# Patient Record
Sex: MALE | Race: BLACK OR AFRICAN AMERICAN | NOT HISPANIC OR LATINO | URBAN - METROPOLITAN AREA
[De-identification: names, ages, dates, MRNs, and addresses within clinical notes are randomized per-mention and may not be internally consistent; named-entity substitution may affect disease eponyms.]

---

## 2019-09-13 ENCOUNTER — EMERGENCY (EMERGENCY)
Facility: HOSPITAL | Age: 74
LOS: 0 days | Discharge: HOME | End: 2019-09-13
Attending: EMERGENCY MEDICINE
Payer: MEDICARE

## 2019-09-13 VITALS
TEMPERATURE: 99 F | HEART RATE: 70 BPM | RESPIRATION RATE: 20 BRPM | WEIGHT: 160.06 LBS | DIASTOLIC BLOOD PRESSURE: 93 MMHG | HEIGHT: 71 IN | OXYGEN SATURATION: 100 % | SYSTOLIC BLOOD PRESSURE: 138 MMHG

## 2019-09-13 VITALS
HEART RATE: 63 BPM | DIASTOLIC BLOOD PRESSURE: 86 MMHG | TEMPERATURE: 98 F | OXYGEN SATURATION: 96 % | RESPIRATION RATE: 19 BRPM | SYSTOLIC BLOOD PRESSURE: 136 MMHG

## 2019-09-13 DIAGNOSIS — I12.9 HYPERTENSIVE CHRONIC KIDNEY DISEASE WITH STAGE 1 THROUGH STAGE 4 CHRONIC KIDNEY DISEASE, OR UNSPECIFIED CHRONIC KIDNEY DISEASE: ICD-10-CM

## 2019-09-13 DIAGNOSIS — R07.9 CHEST PAIN, UNSPECIFIED: ICD-10-CM

## 2019-09-13 DIAGNOSIS — K21.9 GASTRO-ESOPHAGEAL REFLUX DISEASE WITHOUT ESOPHAGITIS: ICD-10-CM

## 2019-09-13 DIAGNOSIS — F17.210 NICOTINE DEPENDENCE, CIGARETTES, UNCOMPLICATED: ICD-10-CM

## 2019-09-13 DIAGNOSIS — N18.9 CHRONIC KIDNEY DISEASE, UNSPECIFIED: ICD-10-CM

## 2019-09-13 LAB
ALBUMIN SERPL ELPH-MCNC: 3.6 G/DL — SIGNIFICANT CHANGE UP (ref 3.3–5)
ALP SERPL-CCNC: 65 U/L — SIGNIFICANT CHANGE UP (ref 40–120)
ALT FLD-CCNC: 19 U/L — SIGNIFICANT CHANGE UP (ref 12–78)
ANION GAP SERPL CALC-SCNC: 6 MMOL/L — SIGNIFICANT CHANGE UP (ref 5–17)
APTT BLD: 32.5 SEC — SIGNIFICANT CHANGE UP (ref 27.5–36.3)
AST SERPL-CCNC: 17 U/L — SIGNIFICANT CHANGE UP (ref 15–37)
BASOPHILS # BLD AUTO: 0.04 K/UL — SIGNIFICANT CHANGE UP (ref 0–0.2)
BASOPHILS NFR BLD AUTO: 0.9 % — SIGNIFICANT CHANGE UP (ref 0–2)
BILIRUB SERPL-MCNC: 0.7 MG/DL — SIGNIFICANT CHANGE UP (ref 0.2–1.2)
BUN SERPL-MCNC: 26 MG/DL — HIGH (ref 7–23)
CALCIUM SERPL-MCNC: 8.1 MG/DL — LOW (ref 8.5–10.1)
CHLORIDE SERPL-SCNC: 107 MMOL/L — SIGNIFICANT CHANGE UP (ref 96–108)
CK MB BLD-MCNC: 1.1 % — SIGNIFICANT CHANGE UP (ref 0–3.5)
CK MB CFR SERPL CALC: 1.3 NG/ML — SIGNIFICANT CHANGE UP (ref 0.5–3.6)
CK SERPL-CCNC: 115 U/L — SIGNIFICANT CHANGE UP (ref 26–308)
CO2 SERPL-SCNC: 28 MMOL/L — SIGNIFICANT CHANGE UP (ref 22–31)
CREAT SERPL-MCNC: 2.25 MG/DL — HIGH (ref 0.5–1.3)
EOSINOPHIL # BLD AUTO: 0.16 K/UL — SIGNIFICANT CHANGE UP (ref 0–0.5)
EOSINOPHIL NFR BLD AUTO: 3.6 % — SIGNIFICANT CHANGE UP (ref 0–6)
GLUCOSE SERPL-MCNC: 98 MG/DL — SIGNIFICANT CHANGE UP (ref 70–99)
HCT VFR BLD CALC: 38.5 % — LOW (ref 39–50)
HGB BLD-MCNC: 12.2 G/DL — LOW (ref 13–17)
IMM GRANULOCYTES NFR BLD AUTO: 0.2 % — SIGNIFICANT CHANGE UP (ref 0–1.5)
INR BLD: 0.92 RATIO — SIGNIFICANT CHANGE UP (ref 0.88–1.16)
LYMPHOCYTES # BLD AUTO: 1.92 K/UL — SIGNIFICANT CHANGE UP (ref 1–3.3)
LYMPHOCYTES # BLD AUTO: 43.7 % — SIGNIFICANT CHANGE UP (ref 13–44)
MCHC RBC-ENTMCNC: 29.7 PG — SIGNIFICANT CHANGE UP (ref 27–34)
MCHC RBC-ENTMCNC: 31.7 GM/DL — LOW (ref 32–36)
MCV RBC AUTO: 93.7 FL — SIGNIFICANT CHANGE UP (ref 80–100)
MONOCYTES # BLD AUTO: 0.48 K/UL — SIGNIFICANT CHANGE UP (ref 0–0.9)
MONOCYTES NFR BLD AUTO: 10.9 % — SIGNIFICANT CHANGE UP (ref 2–14)
NEUTROPHILS # BLD AUTO: 1.78 K/UL — LOW (ref 1.8–7.4)
NEUTROPHILS NFR BLD AUTO: 40.7 % — LOW (ref 43–77)
NRBC # BLD: 0 /100 WBCS — SIGNIFICANT CHANGE UP (ref 0–0)
PLATELET # BLD AUTO: 232 K/UL — SIGNIFICANT CHANGE UP (ref 150–400)
POTASSIUM SERPL-MCNC: 4.1 MMOL/L — SIGNIFICANT CHANGE UP (ref 3.5–5.3)
POTASSIUM SERPL-SCNC: 4.1 MMOL/L — SIGNIFICANT CHANGE UP (ref 3.5–5.3)
PROT SERPL-MCNC: 7.4 GM/DL — SIGNIFICANT CHANGE UP (ref 6–8.3)
PROTHROM AB SERPL-ACNC: 10.3 SEC — SIGNIFICANT CHANGE UP (ref 10–12.9)
RBC # BLD: 4.11 M/UL — LOW (ref 4.2–5.8)
RBC # FLD: 12.7 % — SIGNIFICANT CHANGE UP (ref 10.3–14.5)
SODIUM SERPL-SCNC: 141 MMOL/L — SIGNIFICANT CHANGE UP (ref 135–145)
TROPONIN I SERPL-MCNC: <.015 NG/ML — SIGNIFICANT CHANGE UP (ref 0.01–0.04)
WBC # BLD: 4.39 K/UL — SIGNIFICANT CHANGE UP (ref 3.8–10.5)
WBC # FLD AUTO: 4.39 K/UL — SIGNIFICANT CHANGE UP (ref 3.8–10.5)

## 2019-09-13 PROCEDURE — 93010 ELECTROCARDIOGRAM REPORT: CPT

## 2019-09-13 PROCEDURE — 71045 X-RAY EXAM CHEST 1 VIEW: CPT | Mod: 26

## 2019-09-13 PROCEDURE — 99285 EMERGENCY DEPT VISIT HI MDM: CPT

## 2019-09-13 RX ORDER — PANTOPRAZOLE SODIUM 20 MG/1
40 TABLET, DELAYED RELEASE ORAL ONCE
Refills: 0 | Status: COMPLETED | OUTPATIENT
Start: 2019-09-13 | End: 2019-09-13

## 2019-09-13 RX ADMIN — Medication 30 MILLILITER(S): at 14:42

## 2019-09-13 RX ADMIN — PANTOPRAZOLE SODIUM 40 MILLIGRAM(S): 20 TABLET, DELAYED RELEASE ORAL at 14:43

## 2019-09-13 NOTE — ED PROVIDER NOTE - CONSTITUTIONAL, MLM
normal... Well appearing, well nourished, awake, alert, oriented to person, place, time/situation and in no apparent distress. Speaking in clear full sentences no nasal flaring no shoulders retractions no diaphoresis not holding his head/chest/abdomen, smiling pleasant appears very comfortable

## 2019-09-13 NOTE — ED ADULT NURSE NOTE - NSIMPLEMENTINTERV_GEN_ALL_ED
Implemented All Universal Safety Interventions:  [place] to call system. Call bell, personal items and telephone within reach. Instruct patient to call for assistance. Room bathroom lighting operational. Non-slip footwear when patient is off stretcher. Physically safe environment: no spills, clutter or unnecessary equipment. Stretcher in lowest position, wheels locked, appropriate side rails in place.
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
## 2019-09-13 NOTE — ED PROVIDER NOTE - PATIENT PORTAL LINK FT
You can access the FollowMyHealth Patient Portal offered by [facility] by registering at the following website: [email or web address]. By joining [employer]’s FollowMyHealth portal, you will also be able to view your health information using other applications (apps) compatible with our system.

## 2019-09-23 NOTE — ED ADULT NURSE NOTE - OBJECTIVE STATEMENT
74 y.o male with hx of gerd, htn and chronic renal disease complains of burning chest pain for 3 days in the center of the chest.

## 2019-10-23 ENCOUNTER — EMERGENCY (EMERGENCY)
Facility: HOSPITAL | Age: 74
LOS: 0 days | Discharge: HOME | End: 2019-10-23
Attending: EMERGENCY MEDICINE
Payer: MEDICARE

## 2019-10-23 VITALS
HEIGHT: 71 IN | RESPIRATION RATE: 17 BRPM | WEIGHT: 154.98 LBS | SYSTOLIC BLOOD PRESSURE: 137 MMHG | TEMPERATURE: 98 F | DIASTOLIC BLOOD PRESSURE: 92 MMHG | OXYGEN SATURATION: 99 %

## 2019-10-23 VITALS
SYSTOLIC BLOOD PRESSURE: 133 MMHG | DIASTOLIC BLOOD PRESSURE: 75 MMHG | RESPIRATION RATE: 18 BRPM | TEMPERATURE: 98 F | OXYGEN SATURATION: 100 % | HEART RATE: 76 BPM

## 2019-10-23 DIAGNOSIS — R10.32 LEFT LOWER QUADRANT PAIN: ICD-10-CM

## 2019-10-23 DIAGNOSIS — R35.0 FREQUENCY OF MICTURITION: ICD-10-CM

## 2019-10-23 LAB
ALBUMIN SERPL ELPH-MCNC: 3.8 G/DL — SIGNIFICANT CHANGE UP (ref 3.3–5)
ALP SERPL-CCNC: 72 U/L — SIGNIFICANT CHANGE UP (ref 40–120)
ALT FLD-CCNC: 37 U/L — SIGNIFICANT CHANGE UP (ref 12–78)
ANION GAP SERPL CALC-SCNC: 5 MMOL/L — SIGNIFICANT CHANGE UP (ref 5–17)
APPEARANCE UR: CLEAR — SIGNIFICANT CHANGE UP
AST SERPL-CCNC: 31 U/L — SIGNIFICANT CHANGE UP (ref 15–37)
BASOPHILS # BLD AUTO: 0.05 K/UL — SIGNIFICANT CHANGE UP (ref 0–0.2)
BASOPHILS NFR BLD AUTO: 0.8 % — SIGNIFICANT CHANGE UP (ref 0–2)
BILIRUB SERPL-MCNC: 0.5 MG/DL — SIGNIFICANT CHANGE UP (ref 0.2–1.2)
BILIRUB UR-MCNC: NEGATIVE — SIGNIFICANT CHANGE UP
BUN SERPL-MCNC: 19 MG/DL — SIGNIFICANT CHANGE UP (ref 7–23)
CALCIUM SERPL-MCNC: 9 MG/DL — SIGNIFICANT CHANGE UP (ref 8.5–10.1)
CHLORIDE SERPL-SCNC: 108 MMOL/L — SIGNIFICANT CHANGE UP (ref 96–108)
CO2 SERPL-SCNC: 25 MMOL/L — SIGNIFICANT CHANGE UP (ref 22–31)
COLOR SPEC: YELLOW — SIGNIFICANT CHANGE UP
CREAT SERPL-MCNC: 1.47 MG/DL — HIGH (ref 0.5–1.3)
DIFF PNL FLD: NEGATIVE — SIGNIFICANT CHANGE UP
EOSINOPHIL # BLD AUTO: 0.09 K/UL — SIGNIFICANT CHANGE UP (ref 0–0.5)
EOSINOPHIL NFR BLD AUTO: 1.5 % — SIGNIFICANT CHANGE UP (ref 0–6)
GLUCOSE SERPL-MCNC: 103 MG/DL — HIGH (ref 70–99)
GLUCOSE UR QL: NEGATIVE MG/DL — SIGNIFICANT CHANGE UP
HCT VFR BLD CALC: 35.9 % — LOW (ref 39–50)
HGB BLD-MCNC: 11.3 G/DL — LOW (ref 13–17)
IMM GRANULOCYTES NFR BLD AUTO: 0.2 % — SIGNIFICANT CHANGE UP (ref 0–1.5)
KETONES UR-MCNC: NEGATIVE — SIGNIFICANT CHANGE UP
LEUKOCYTE ESTERASE UR-ACNC: NEGATIVE — SIGNIFICANT CHANGE UP
LIDOCAIN IGE QN: 113 U/L — SIGNIFICANT CHANGE UP (ref 73–393)
LYMPHOCYTES # BLD AUTO: 1.82 K/UL — SIGNIFICANT CHANGE UP (ref 1–3.3)
LYMPHOCYTES # BLD AUTO: 30.8 % — SIGNIFICANT CHANGE UP (ref 13–44)
MCHC RBC-ENTMCNC: 28.9 PG — SIGNIFICANT CHANGE UP (ref 27–34)
MCHC RBC-ENTMCNC: 31.5 GM/DL — LOW (ref 32–36)
MCV RBC AUTO: 91.8 FL — SIGNIFICANT CHANGE UP (ref 80–100)
MONOCYTES # BLD AUTO: 0.39 K/UL — SIGNIFICANT CHANGE UP (ref 0–0.9)
MONOCYTES NFR BLD AUTO: 6.6 % — SIGNIFICANT CHANGE UP (ref 2–14)
NEUTROPHILS # BLD AUTO: 3.55 K/UL — SIGNIFICANT CHANGE UP (ref 1.8–7.4)
NEUTROPHILS NFR BLD AUTO: 60.1 % — SIGNIFICANT CHANGE UP (ref 43–77)
NITRITE UR-MCNC: NEGATIVE — SIGNIFICANT CHANGE UP
NRBC # BLD: 0 /100 WBCS — SIGNIFICANT CHANGE UP (ref 0–0)
PH UR: 6 — SIGNIFICANT CHANGE UP (ref 5–8)
PLATELET # BLD AUTO: 252 K/UL — SIGNIFICANT CHANGE UP (ref 150–400)
POTASSIUM SERPL-MCNC: 4.8 MMOL/L — SIGNIFICANT CHANGE UP (ref 3.5–5.3)
POTASSIUM SERPL-SCNC: 4.8 MMOL/L — SIGNIFICANT CHANGE UP (ref 3.5–5.3)
PROT SERPL-MCNC: 7.3 GM/DL — SIGNIFICANT CHANGE UP (ref 6–8.3)
PROT UR-MCNC: NEGATIVE MG/DL — SIGNIFICANT CHANGE UP
RBC # BLD: 3.91 M/UL — LOW (ref 4.2–5.8)
RBC # FLD: 12.8 % — SIGNIFICANT CHANGE UP (ref 10.3–14.5)
SODIUM SERPL-SCNC: 138 MMOL/L — SIGNIFICANT CHANGE UP (ref 135–145)
SP GR SPEC: 1.01 — SIGNIFICANT CHANGE UP (ref 1.01–1.02)
UROBILINOGEN FLD QL: NEGATIVE MG/DL — SIGNIFICANT CHANGE UP
WBC # BLD: 5.91 K/UL — SIGNIFICANT CHANGE UP (ref 3.8–10.5)
WBC # FLD AUTO: 5.91 K/UL — SIGNIFICANT CHANGE UP (ref 3.8–10.5)

## 2019-10-23 PROCEDURE — 74176 CT ABD & PELVIS W/O CONTRAST: CPT | Mod: 26

## 2019-10-23 PROCEDURE — 99284 EMERGENCY DEPT VISIT MOD MDM: CPT

## 2019-10-23 PROCEDURE — 71045 X-RAY EXAM CHEST 1 VIEW: CPT | Mod: 26

## 2019-10-23 RX ORDER — CYCLOBENZAPRINE HYDROCHLORIDE 10 MG/1
1 TABLET, FILM COATED ORAL
Qty: 15 | Refills: 0
Start: 2019-10-23 | End: 2019-10-27

## 2019-10-23 RX ORDER — SODIUM CHLORIDE 9 MG/ML
1000 INJECTION INTRAMUSCULAR; INTRAVENOUS; SUBCUTANEOUS ONCE
Refills: 0 | Status: COMPLETED | OUTPATIENT
Start: 2019-10-23 | End: 2019-10-23

## 2019-10-23 RX ORDER — MORPHINE SULFATE 50 MG/1
4 CAPSULE, EXTENDED RELEASE ORAL ONCE
Refills: 0 | Status: DISCONTINUED | OUTPATIENT
Start: 2019-10-23 | End: 2019-10-23

## 2019-10-23 RX ORDER — ACETAMINOPHEN 500 MG
2 TABLET ORAL
Qty: 30 | Refills: 0
Start: 2019-10-23 | End: 2019-10-27

## 2019-10-23 RX ORDER — ONDANSETRON 8 MG/1
4 TABLET, FILM COATED ORAL ONCE
Refills: 0 | Status: COMPLETED | OUTPATIENT
Start: 2019-10-23 | End: 2019-10-23

## 2019-10-23 RX ORDER — IOHEXOL 300 MG/ML
30 INJECTION, SOLUTION INTRAVENOUS ONCE
Refills: 0 | Status: COMPLETED | OUTPATIENT
Start: 2019-10-23 | End: 2019-10-23

## 2019-10-23 RX ADMIN — IOHEXOL 30 MILLILITER(S): 300 INJECTION, SOLUTION INTRAVENOUS at 07:56

## 2019-10-23 RX ADMIN — ONDANSETRON 4 MILLIGRAM(S): 8 TABLET, FILM COATED ORAL at 07:56

## 2019-10-23 RX ADMIN — SODIUM CHLORIDE 1000 MILLILITER(S): 9 INJECTION INTRAMUSCULAR; INTRAVENOUS; SUBCUTANEOUS at 07:38

## 2019-10-23 RX ADMIN — MORPHINE SULFATE 4 MILLIGRAM(S): 50 CAPSULE, EXTENDED RELEASE ORAL at 07:56

## 2019-10-23 NOTE — ED ADULT NURSE NOTE - CHIEF COMPLAINT QUOTE
73 yo M  hx of gallstones, HTN  C/O LLQ ABD pain  radiating to L lower back , with urinary frequency x 1 week. pt denies N/V/dysuria

## 2019-10-23 NOTE — ED PROVIDER NOTE - PATIENT PORTAL LINK FT
You can access the FollowMyHealth Patient Portal offered by [facility] by registering at the following website: [email or web address]. By joining [employer]’s FollowMyHealth portal, you will also be able to view your health information using other applications (apps) compatible with our system.

## 2019-10-23 NOTE — ED PROVIDER NOTE - CLINICAL SUMMARY MEDICAL DECISION MAKING FREE TEXT BOX
Ddx: Diverticulitis/ kidney stone/ no abdominal tenderness or guarding to suggest surgical abdomen.   Plan: Cbc, cmp, lipase, CT abd, reassess Ddx: Diverticulitis/ kidney stone/ no abdominal tenderness or guarding to suggest surgical abdomen/ msk back pain/   Plan: Cbc, cmp, lipase, CT abd, reassess
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
## 2019-10-23 NOTE — ED ADULT TRIAGE NOTE - CHIEF COMPLAINT QUOTE
75 yo M  hx of gallstones, HTN  C/O LLQ ABD pain  radiating to L lower back , with urinary frequency x 1 week. pt denies N/V/dysuria

## 2019-10-24 LAB
CULTURE RESULTS: SIGNIFICANT CHANGE UP
SPECIMEN SOURCE: SIGNIFICANT CHANGE UP

## 2019-12-04 ENCOUNTER — INPATIENT (INPATIENT)
Facility: HOSPITAL | Age: 74
LOS: 6 days | Discharge: HOME | DRG: 871 | End: 2019-12-11
Attending: HOSPITALIST | Admitting: HOSPITALIST
Payer: MEDICARE

## 2019-12-04 VITALS
HEIGHT: 68 IN | OXYGEN SATURATION: 84 % | RESPIRATION RATE: 22 BRPM | HEART RATE: 98 BPM | SYSTOLIC BLOOD PRESSURE: 113 MMHG | TEMPERATURE: 102 F | DIASTOLIC BLOOD PRESSURE: 72 MMHG | WEIGHT: 169.98 LBS

## 2019-12-04 DIAGNOSIS — A41.9 SEPSIS, UNSPECIFIED ORGANISM: ICD-10-CM

## 2019-12-04 LAB
ALBUMIN SERPL ELPH-MCNC: 3.6 G/DL — SIGNIFICANT CHANGE UP (ref 3.3–5)
ALP SERPL-CCNC: 45 U/L — SIGNIFICANT CHANGE UP (ref 40–120)
ALT FLD-CCNC: 21 U/L — SIGNIFICANT CHANGE UP (ref 10–45)
ANION GAP SERPL CALC-SCNC: 21 MMOL/L — HIGH (ref 5–17)
APTT BLD: 28.4 SEC — SIGNIFICANT CHANGE UP (ref 27.5–36.3)
AST SERPL-CCNC: 28 U/L — SIGNIFICANT CHANGE UP (ref 10–40)
BASE EXCESS BLDV CALC-SCNC: -4.8 MMOL/L — LOW (ref -2–2)
BASE EXCESS BLDV CALC-SCNC: -7.3 MMOL/L — LOW (ref -2–2)
BASOPHILS # BLD AUTO: 0.02 K/UL — SIGNIFICANT CHANGE UP (ref 0–0.2)
BASOPHILS NFR BLD AUTO: 0.2 % — SIGNIFICANT CHANGE UP (ref 0–2)
BILIRUB SERPL-MCNC: 0.8 MG/DL — SIGNIFICANT CHANGE UP (ref 0.2–1.2)
BUN SERPL-MCNC: 83 MG/DL — HIGH (ref 7–23)
CA-I SERPL-SCNC: 0.81 MMOL/L — LOW (ref 1.12–1.3)
CA-I SERPL-SCNC: 0.96 MMOL/L — LOW (ref 1.12–1.3)
CALCIUM SERPL-MCNC: 7.7 MG/DL — LOW (ref 8.4–10.5)
CHLORIDE BLDV-SCNC: 109 MMOL/L — HIGH (ref 96–108)
CHLORIDE BLDV-SCNC: 98 MMOL/L — SIGNIFICANT CHANGE UP (ref 96–108)
CHLORIDE SERPL-SCNC: 93 MMOL/L — LOW (ref 96–108)
CO2 BLDV-SCNC: 17 MMOL/L — LOW (ref 22–30)
CO2 BLDV-SCNC: 20 MMOL/L — LOW (ref 22–30)
CO2 SERPL-SCNC: 19 MMOL/L — LOW (ref 22–31)
CREAT SERPL-MCNC: 7.17 MG/DL — HIGH (ref 0.5–1.3)
EOSINOPHIL # BLD AUTO: 0 K/UL — SIGNIFICANT CHANGE UP (ref 0–0.5)
EOSINOPHIL NFR BLD AUTO: 0 % — SIGNIFICANT CHANGE UP (ref 0–6)
GAS PNL BLDV: 133 MMOL/L — LOW (ref 135–145)
GAS PNL BLDV: 135 MMOL/L — SIGNIFICANT CHANGE UP (ref 135–145)
GAS PNL BLDV: SIGNIFICANT CHANGE UP
GLUCOSE BLDV-MCNC: 111 MG/DL — HIGH (ref 70–99)
GLUCOSE BLDV-MCNC: 168 MG/DL — HIGH (ref 70–99)
GLUCOSE SERPL-MCNC: 154 MG/DL — HIGH (ref 70–99)
HCO3 BLDV-SCNC: 16 MMOL/L — LOW (ref 21–29)
HCO3 BLDV-SCNC: 19 MMOL/L — LOW (ref 21–29)
HCT VFR BLD CALC: 24.7 % — LOW (ref 39–50)
HCT VFR BLDA CALC: 20 % — CRITICAL LOW (ref 39–50)
HCT VFR BLDA CALC: 24 % — LOW (ref 39–50)
HGB BLD CALC-MCNC: 6.5 G/DL — CRITICAL LOW (ref 13–17)
HGB BLD CALC-MCNC: 7.8 G/DL — LOW (ref 13–17)
HGB BLD-MCNC: 8 G/DL — LOW (ref 13–17)
IMM GRANULOCYTES NFR BLD AUTO: 1.5 % — SIGNIFICANT CHANGE UP (ref 0–1.5)
INR BLD: 1.1 RATIO — SIGNIFICANT CHANGE UP (ref 0.88–1.16)
LACTATE BLDV-MCNC: 1 MMOL/L — SIGNIFICANT CHANGE UP (ref 0.7–2)
LACTATE BLDV-MCNC: 1.5 MMOL/L — SIGNIFICANT CHANGE UP (ref 0.7–2)
LYMPHOCYTES # BLD AUTO: 1.04 K/UL — SIGNIFICANT CHANGE UP (ref 1–3.3)
LYMPHOCYTES # BLD AUTO: 11.7 % — LOW (ref 13–44)
MCHC RBC-ENTMCNC: 27.4 PG — SIGNIFICANT CHANGE UP (ref 27–34)
MCHC RBC-ENTMCNC: 32.4 GM/DL — SIGNIFICANT CHANGE UP (ref 32–36)
MCV RBC AUTO: 84.6 FL — SIGNIFICANT CHANGE UP (ref 80–100)
MONOCYTES # BLD AUTO: 0.62 K/UL — SIGNIFICANT CHANGE UP (ref 0–0.9)
MONOCYTES NFR BLD AUTO: 7 % — SIGNIFICANT CHANGE UP (ref 2–14)
NEUTROPHILS # BLD AUTO: 7.09 K/UL — SIGNIFICANT CHANGE UP (ref 1.8–7.4)
NEUTROPHILS NFR BLD AUTO: 79.6 % — HIGH (ref 43–77)
NRBC # BLD: 0 /100 WBCS — SIGNIFICANT CHANGE UP (ref 0–0)
PCO2 BLDV: 26 MMHG — LOW (ref 35–50)
PCO2 BLDV: 33 MMHG — LOW (ref 35–50)
PH BLDV: 7.38 — SIGNIFICANT CHANGE UP (ref 7.35–7.45)
PH BLDV: 7.41 — SIGNIFICANT CHANGE UP (ref 7.35–7.45)
PLAT MORPH BLD: NORMAL — SIGNIFICANT CHANGE UP
PLATELET # BLD AUTO: 244 K/UL — SIGNIFICANT CHANGE UP (ref 150–400)
PO2 BLDV: 34 MMHG — SIGNIFICANT CHANGE UP (ref 25–45)
PO2 BLDV: 38 MMHG — SIGNIFICANT CHANGE UP (ref 25–45)
POTASSIUM BLDV-SCNC: 2.2 MMOL/L — CRITICAL LOW (ref 3.5–5.3)
POTASSIUM BLDV-SCNC: 2.9 MMOL/L — CRITICAL LOW (ref 3.5–5.3)
POTASSIUM SERPL-MCNC: 3.3 MMOL/L — LOW (ref 3.5–5.3)
POTASSIUM SERPL-SCNC: 3.3 MMOL/L — LOW (ref 3.5–5.3)
PROT SERPL-MCNC: 7.3 G/DL — SIGNIFICANT CHANGE UP (ref 6–8.3)
PROTHROM AB SERPL-ACNC: 12.7 SEC — SIGNIFICANT CHANGE UP (ref 10–12.9)
RAPID RVP RESULT: SIGNIFICANT CHANGE UP
RBC # BLD: 2.92 M/UL — LOW (ref 4.2–5.8)
RBC # FLD: 13.7 % — SIGNIFICANT CHANGE UP (ref 10.3–14.5)
RBC BLD AUTO: NORMAL — SIGNIFICANT CHANGE UP
SAO2 % BLDV: 58 % — LOW (ref 67–88)
SAO2 % BLDV: 65 % — LOW (ref 67–88)
SODIUM SERPL-SCNC: 133 MMOL/L — LOW (ref 135–145)
WBC # BLD: 8.9 K/UL — SIGNIFICANT CHANGE UP (ref 3.8–10.5)
WBC # FLD AUTO: 8.9 K/UL — SIGNIFICANT CHANGE UP (ref 3.8–10.5)

## 2019-12-04 PROCEDURE — 93010 ELECTROCARDIOGRAM REPORT: CPT

## 2019-12-04 PROCEDURE — 71045 X-RAY EXAM CHEST 1 VIEW: CPT | Mod: 26

## 2019-12-04 PROCEDURE — 99291 CRITICAL CARE FIRST HOUR: CPT | Mod: GC

## 2019-12-04 RX ORDER — IPRATROPIUM/ALBUTEROL SULFATE 18-103MCG
3 AEROSOL WITH ADAPTER (GRAM) INHALATION ONCE
Refills: 0 | Status: COMPLETED | OUTPATIENT
Start: 2019-12-04 | End: 2019-12-04

## 2019-12-04 RX ORDER — PIPERACILLIN AND TAZOBACTAM 4; .5 G/20ML; G/20ML
3.38 INJECTION, POWDER, LYOPHILIZED, FOR SOLUTION INTRAVENOUS ONCE
Refills: 0 | Status: COMPLETED | OUTPATIENT
Start: 2019-12-04 | End: 2019-12-04

## 2019-12-04 RX ORDER — ACETAMINOPHEN 500 MG
975 TABLET ORAL ONCE
Refills: 0 | Status: COMPLETED | OUTPATIENT
Start: 2019-12-04 | End: 2019-12-04

## 2019-12-04 RX ORDER — VANCOMYCIN HCL 1 G
1500 VIAL (EA) INTRAVENOUS ONCE
Refills: 0 | Status: COMPLETED | OUTPATIENT
Start: 2019-12-04 | End: 2019-12-04

## 2019-12-04 RX ORDER — POTASSIUM CHLORIDE 20 MEQ
10 PACKET (EA) ORAL ONCE
Refills: 0 | Status: COMPLETED | OUTPATIENT
Start: 2019-12-04 | End: 2019-12-04

## 2019-12-04 RX ORDER — SODIUM CHLORIDE 9 MG/ML
2400 INJECTION INTRAMUSCULAR; INTRAVENOUS; SUBCUTANEOUS ONCE
Refills: 0 | Status: COMPLETED | OUTPATIENT
Start: 2019-12-04 | End: 2019-12-04

## 2019-12-04 RX ADMIN — SODIUM CHLORIDE 2400 MILLILITER(S): 9 INJECTION INTRAMUSCULAR; INTRAVENOUS; SUBCUTANEOUS at 22:22

## 2019-12-04 RX ADMIN — Medication 3 MILLILITER(S): at 20:40

## 2019-12-04 RX ADMIN — Medication 300 MILLIGRAM(S): at 22:21

## 2019-12-04 RX ADMIN — PIPERACILLIN AND TAZOBACTAM 200 GRAM(S): 4; .5 INJECTION, POWDER, LYOPHILIZED, FOR SOLUTION INTRAVENOUS at 20:38

## 2019-12-04 RX ADMIN — Medication 3 MILLILITER(S): at 20:38

## 2019-12-04 RX ADMIN — Medication 975 MILLIGRAM(S): at 20:37
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
## 2019-12-04 NOTE — ED PROVIDER NOTE - PHYSICAL EXAMINATION
Gen: thin, uncomfortable appearing  Eyes: PERRL, EOMI   HENT: Normocephalic, atraumatic. External ears normal, no rhinorrhea, moist mucous membranes.   CV: RRR, no M/R/G  Resp: mid lung field crackles b/l, non-labored, speaking without difficulty on room air  Abd: soft, non tender, non rigid, no guarding or rebound tenderness  Back: No CVAT bilaterally, no midline ttp  Skin: dry, wwp   Neuro: AOx3, speech is fluent and appropriate  Psych: Mood "alright", affect euthymic

## 2019-12-04 NOTE — ED PROVIDER NOTE - CLINICAL SUMMARY MEDICAL DECISION MAKING FREE TEXT BOX
74M presenting for evalaution of 6 days of fevers, cough, fatigue, on exam with crackles mid lung fields, appears uncomfortable, febrile, tachypneic with O2 sat in the low 90s on RA, start on O2NC, sepsis labs + cxr, ivf 30cc/kg, apap, zosyn, follow up studies, reassess, dispo.

## 2019-12-04 NOTE — ED ADULT NURSE NOTE - OBJECTIVE STATEMENT
Patient is a 74 year old male complaining of SOB. Arrived by EMS. Patient has history of htn. Patient is A&O x 1. Pt reports feeling weak and cough since thanksgiving, pt family states he started to get confused yesterday. pt has SOB and fever and is confused. pt family states he received a flu shot this year. Denies complaints of chest pain, n/v/d, headache, syncope, burning urination, blood in urine, blood in stool. Abd is soft, non tender, non distended. Skin is hot and dry. Color is consistent with ethnicity. pt in tripod position, pt has chills. Safety and comfort maintained. family at the bedside. Will continue to monitor.

## 2019-12-04 NOTE — ED PROVIDER NOTE - CARE PLAN
Principal Discharge DX:	Sepsis  Secondary Diagnosis:	Pneumonia  Secondary Diagnosis:	FLORENTIN (acute kidney injury) Principal Discharge DX:	Sepsis  Goal:	hypoxia  Secondary Diagnosis:	Pneumonia  Secondary Diagnosis:	FLORENTIN (acute kidney injury)

## 2019-12-04 NOTE — ED PROVIDER NOTE - ATTENDING CONTRIBUTION TO CARE
patient is a smoker with cough fever, confusion x 3 days.   moderate symptoms of shortness of breath and weakness  persistent  patient has not done very much over the past 3 days.   mild confusion, states it is 1949 several times until redirected and then states it is 2019  dry mm  tachycardic   mild tachypnea  crackles to bilateral lung fields, right anterior lung field  no edema  soft, ntnd  no rebound/guarding   no rash/vesicles/petechiae   no gross deformity of extremities   concern for sepsis secondary to uri  Will obtain IV x 2, cbc, cmp, +/-ce, VBG with lactate at time 0, fluid bolus @ 30cc/kg initiated upon departure from the room on initial assessment within 30 minutes of arrival, urine analysis and blood cultures x 2 are obtained prior to antibiotics =>prior to administration of antibiotics goal within 3 hours of arrival, and will repeat lactate with VBG if the original is elevated and do so within 6 hours of initial lactate.   Will follow up on labs, analgesia, imaging, reassess and disposition to the inpatient team as clinically indicated.   Based on patient's history and physical exam, as well as the results of today's workup, I feel that patient warrants admission to the hospital for further workup/evaluation and continued management. I discussed the findings of today's workup with the patient and addressed the patient's questions and concerns. The patient was agreeable with admission. Our team spoke with the inpatient receiving team who accepted the patient for admission and subsequently took over the patient's care at the time of admission.

## 2019-12-04 NOTE — ED ADULT NURSE NOTE - NSIMPLEMENTINTERV_GEN_ALL_ED
Implemented All Fall Risk Interventions:  [place] to call system. Call bell, personal items and telephone within reach. Instruct patient to call for assistance. Room bathroom lighting operational. Non-slip footwear when patient is off stretcher. Physically safe environment: no spills, clutter or unnecessary equipment. Stretcher in lowest position, wheels locked, appropriate side rails in place. Provide visual cue, wrist band, yellow gown, etc. Monitor gait and stability. Monitor for mental status changes and reorient to person, place, and time. Review medications for side effects contributing to fall risk. Reinforce activity limits and safety measures with patient and family.

## 2019-12-05 ENCOUNTER — TRANSCRIPTION ENCOUNTER (OUTPATIENT)
Age: 74
End: 2019-12-05

## 2019-12-05 DIAGNOSIS — M54.9 DORSALGIA, UNSPECIFIED: ICD-10-CM

## 2019-12-05 DIAGNOSIS — I10 ESSENTIAL (PRIMARY) HYPERTENSION: ICD-10-CM

## 2019-12-05 DIAGNOSIS — J18.9 PNEUMONIA, UNSPECIFIED ORGANISM: ICD-10-CM

## 2019-12-05 DIAGNOSIS — Z79.899 OTHER LONG TERM (CURRENT) DRUG THERAPY: ICD-10-CM

## 2019-12-05 DIAGNOSIS — J18.1 LOBAR PNEUMONIA, UNSPECIFIED ORGANISM: ICD-10-CM

## 2019-12-05 DIAGNOSIS — N17.9 ACUTE KIDNEY FAILURE, UNSPECIFIED: ICD-10-CM

## 2019-12-05 DIAGNOSIS — Z29.9 ENCOUNTER FOR PROPHYLACTIC MEASURES, UNSPECIFIED: ICD-10-CM

## 2019-12-05 DIAGNOSIS — A41.9 SEPSIS, UNSPECIFIED ORGANISM: ICD-10-CM

## 2019-12-05 DIAGNOSIS — G93.41 METABOLIC ENCEPHALOPATHY: ICD-10-CM

## 2019-12-05 DIAGNOSIS — D64.9 ANEMIA, UNSPECIFIED: ICD-10-CM

## 2019-12-05 DIAGNOSIS — E87.1 HYPO-OSMOLALITY AND HYPONATREMIA: ICD-10-CM

## 2019-12-05 LAB
ANION GAP SERPL CALC-SCNC: 15 MMOL/L — SIGNIFICANT CHANGE UP (ref 5–17)
APPEARANCE UR: ABNORMAL
BACTERIA # UR AUTO: ABNORMAL
BILIRUB UR-MCNC: ABNORMAL
BUN SERPL-MCNC: 86 MG/DL — HIGH (ref 7–23)
CALCIUM SERPL-MCNC: 7.4 MG/DL — LOW (ref 8.4–10.5)
CHLORIDE SERPL-SCNC: 98 MMOL/L — SIGNIFICANT CHANGE UP (ref 96–108)
CO2 SERPL-SCNC: 20 MMOL/L — LOW (ref 22–31)
COLOR SPEC: YELLOW — SIGNIFICANT CHANGE UP
COMMENT - URINE: SIGNIFICANT CHANGE UP
CREAT ?TM UR-MCNC: 224 MG/DL — SIGNIFICANT CHANGE UP
CREAT SERPL-MCNC: 7.35 MG/DL — HIGH (ref 0.5–1.3)
CULTURE RESULTS: NO GROWTH — SIGNIFICANT CHANGE UP
DIFF PNL FLD: NEGATIVE — SIGNIFICANT CHANGE UP
EPI CELLS # UR: 5 — SIGNIFICANT CHANGE UP
FERRITIN SERPL-MCNC: 1601 NG/ML — HIGH (ref 30–400)
FOLATE SERPL-MCNC: 15.5 NG/ML — SIGNIFICANT CHANGE UP
GLUCOSE SERPL-MCNC: 155 MG/DL — HIGH (ref 70–99)
GLUCOSE UR QL: NEGATIVE — SIGNIFICANT CHANGE UP
GRAN CASTS # UR COMP ASSIST: 6 /LPF — SIGNIFICANT CHANGE UP
HCT VFR BLD CALC: 23.5 % — LOW (ref 39–50)
HGB BLD-MCNC: 8 G/DL — LOW (ref 13–17)
HYALINE CASTS # UR AUTO: 16 /LPF — HIGH (ref 0–7)
IRON SATN MFR SERPL: 5 % — LOW (ref 16–55)
IRON SATN MFR SERPL: 9 UG/DL — LOW (ref 45–165)
KETONES UR-MCNC: NEGATIVE — SIGNIFICANT CHANGE UP
LEGIONELLA AG UR QL: NEGATIVE — SIGNIFICANT CHANGE UP
LEUKOCYTE ESTERASE UR-ACNC: NEGATIVE — SIGNIFICANT CHANGE UP
MAGNESIUM SERPL-MCNC: 1.2 MG/DL — LOW (ref 1.6–2.6)
MCHC RBC-ENTMCNC: 28.4 PG — SIGNIFICANT CHANGE UP (ref 27–34)
MCHC RBC-ENTMCNC: 34 GM/DL — SIGNIFICANT CHANGE UP (ref 32–36)
MCV RBC AUTO: 83.3 FL — SIGNIFICANT CHANGE UP (ref 80–100)
NITRITE UR-MCNC: NEGATIVE — SIGNIFICANT CHANGE UP
NRBC # BLD: 0 /100 WBCS — SIGNIFICANT CHANGE UP (ref 0–0)
OSMOLALITY UR: 358 MOS/KG — SIGNIFICANT CHANGE UP (ref 300–900)
PH UR: 5.5 — SIGNIFICANT CHANGE UP (ref 5–8)
PHOSPHATE SERPL-MCNC: 4 MG/DL — SIGNIFICANT CHANGE UP (ref 2.5–4.5)
PLATELET # BLD AUTO: 233 K/UL — SIGNIFICANT CHANGE UP (ref 150–400)
POTASSIUM SERPL-MCNC: 3.4 MMOL/L — LOW (ref 3.5–5.3)
POTASSIUM SERPL-SCNC: 3.4 MMOL/L — LOW (ref 3.5–5.3)
PROT ?TM UR-MCNC: 80 MG/DL — HIGH (ref 0–12)
PROT UR-MCNC: ABNORMAL
PROT/CREAT UR-RTO: 0.4 RATIO — HIGH (ref 0–0.2)
RBC # BLD: 2.82 M/UL — LOW (ref 4.2–5.8)
RBC # BLD: 2.82 M/UL — LOW (ref 4.2–5.8)
RBC # FLD: 13.6 % — SIGNIFICANT CHANGE UP (ref 10.3–14.5)
RBC CASTS # UR COMP ASSIST: 3 /HPF — SIGNIFICANT CHANGE UP (ref 0–4)
RETICS #: 25.1 K/UL — SIGNIFICANT CHANGE UP (ref 25–125)
RETICS/RBC NFR: 0.9 % — SIGNIFICANT CHANGE UP (ref 0.5–2.5)
SODIUM SERPL-SCNC: 133 MMOL/L — LOW (ref 135–145)
SODIUM UR-SCNC: <35 MMOL/L — SIGNIFICANT CHANGE UP
SP GR SPEC: 1.02 — SIGNIFICANT CHANGE UP (ref 1.01–1.02)
SPECIMEN SOURCE: SIGNIFICANT CHANGE UP
TIBC SERPL-MCNC: 198 UG/DL — LOW (ref 220–430)
UIBC SERPL-MCNC: 189 UG/DL — SIGNIFICANT CHANGE UP (ref 110–370)
UROBILINOGEN FLD QL: NEGATIVE — SIGNIFICANT CHANGE UP
VIT B12 SERPL-MCNC: 571 PG/ML — SIGNIFICANT CHANGE UP (ref 232–1245)
WBC # BLD: 9.38 K/UL — SIGNIFICANT CHANGE UP (ref 3.8–10.5)
WBC # FLD AUTO: 9.38 K/UL — SIGNIFICANT CHANGE UP (ref 3.8–10.5)
WBC UR QL: 2 /HPF — SIGNIFICANT CHANGE UP (ref 0–5)

## 2019-12-05 PROCEDURE — 99223 1ST HOSP IP/OBS HIGH 75: CPT

## 2019-12-05 PROCEDURE — 76770 US EXAM ABDO BACK WALL COMP: CPT | Mod: 26

## 2019-12-05 PROCEDURE — 99223 1ST HOSP IP/OBS HIGH 75: CPT | Mod: GC

## 2019-12-05 RX ORDER — CALCITRIOL 0.5 UG/1
0.25 CAPSULE ORAL DAILY
Refills: 0 | Status: DISCONTINUED | OUTPATIENT
Start: 2019-12-05 | End: 2019-12-11

## 2019-12-05 RX ORDER — SODIUM CHLORIDE 9 MG/ML
1000 INJECTION, SOLUTION INTRAVENOUS
Refills: 0 | Status: DISCONTINUED | OUTPATIENT
Start: 2019-12-05 | End: 2019-12-06

## 2019-12-05 RX ORDER — AZITHROMYCIN 500 MG/1
500 TABLET, FILM COATED ORAL EVERY 24 HOURS
Refills: 0 | Status: DISCONTINUED | OUTPATIENT
Start: 2019-12-05 | End: 2019-12-05

## 2019-12-05 RX ORDER — FAMOTIDINE 10 MG/ML
1 INJECTION INTRAVENOUS
Qty: 0 | Refills: 0 | DISCHARGE

## 2019-12-05 RX ORDER — CEFTRIAXONE 500 MG/1
1000 INJECTION, POWDER, FOR SOLUTION INTRAMUSCULAR; INTRAVENOUS EVERY 24 HOURS
Refills: 0 | Status: COMPLETED | OUTPATIENT
Start: 2019-12-05 | End: 2019-12-11

## 2019-12-05 RX ORDER — OXYCODONE HYDROCHLORIDE 5 MG/1
10 TABLET ORAL EVERY 6 HOURS
Refills: 0 | Status: DISCONTINUED | OUTPATIENT
Start: 2019-12-05 | End: 2019-12-06

## 2019-12-05 RX ORDER — SODIUM CHLORIDE 9 MG/ML
500 INJECTION, SOLUTION INTRAVENOUS ONCE
Refills: 0 | Status: COMPLETED | OUTPATIENT
Start: 2019-12-05 | End: 2019-12-05

## 2019-12-05 RX ORDER — TIZANIDINE 4 MG/1
1 TABLET ORAL
Qty: 0 | Refills: 0 | DISCHARGE

## 2019-12-05 RX ORDER — NIFEDIPINE 30 MG
1 TABLET, EXTENDED RELEASE 24 HR ORAL
Qty: 0 | Refills: 0 | DISCHARGE

## 2019-12-05 RX ORDER — PANTOPRAZOLE SODIUM 20 MG/1
40 TABLET, DELAYED RELEASE ORAL
Refills: 0 | Status: DISCONTINUED | OUTPATIENT
Start: 2019-12-05 | End: 2019-12-11

## 2019-12-05 RX ORDER — HEPARIN SODIUM 5000 [USP'U]/ML
5000 INJECTION INTRAVENOUS; SUBCUTANEOUS EVERY 12 HOURS
Refills: 0 | Status: DISCONTINUED | OUTPATIENT
Start: 2019-12-05 | End: 2019-12-11

## 2019-12-05 RX ORDER — ACETAMINOPHEN 500 MG
650 TABLET ORAL ONCE
Refills: 0 | Status: COMPLETED | OUTPATIENT
Start: 2019-12-05 | End: 2019-12-05

## 2019-12-05 RX ORDER — LISINOPRIL 2.5 MG/1
1 TABLET ORAL
Qty: 0 | Refills: 0 | DISCHARGE

## 2019-12-05 RX ORDER — MAGNESIUM SULFATE 500 MG/ML
1 VIAL (ML) INJECTION ONCE
Refills: 0 | Status: COMPLETED | OUTPATIENT
Start: 2019-12-05 | End: 2019-12-05

## 2019-12-05 RX ORDER — ACETAMINOPHEN 500 MG
650 TABLET ORAL EVERY 6 HOURS
Refills: 0 | Status: DISCONTINUED | OUTPATIENT
Start: 2019-12-05 | End: 2019-12-11

## 2019-12-05 RX ORDER — DICLOFENAC SODIUM 75 MG/1
1 TABLET, DELAYED RELEASE ORAL
Qty: 0 | Refills: 0 | DISCHARGE

## 2019-12-05 RX ADMIN — Medication 100 MILLIEQUIVALENT(S): at 02:14

## 2019-12-05 RX ADMIN — HEPARIN SODIUM 5000 UNIT(S): 5000 INJECTION INTRAVENOUS; SUBCUTANEOUS at 17:01

## 2019-12-05 RX ADMIN — Medication 650 MILLIGRAM(S): at 03:58

## 2019-12-05 RX ADMIN — AZITHROMYCIN 250 MILLIGRAM(S): 500 TABLET, FILM COATED ORAL at 06:22

## 2019-12-05 RX ADMIN — SODIUM CHLORIDE 75 MILLILITER(S): 9 INJECTION, SOLUTION INTRAVENOUS at 17:01

## 2019-12-05 RX ADMIN — OXYCODONE HYDROCHLORIDE 10 MILLIGRAM(S): 5 TABLET ORAL at 17:54

## 2019-12-05 RX ADMIN — SODIUM CHLORIDE 75 MILLILITER(S): 9 INJECTION, SOLUTION INTRAVENOUS at 16:47

## 2019-12-05 RX ADMIN — SODIUM CHLORIDE 75 MILLILITER(S): 9 INJECTION, SOLUTION INTRAVENOUS at 11:01

## 2019-12-05 RX ADMIN — Medication 100 MILLIEQUIVALENT(S): at 01:08

## 2019-12-05 RX ADMIN — Medication 650 MILLIGRAM(S): at 04:50

## 2019-12-05 RX ADMIN — CEFTRIAXONE 100 MILLIGRAM(S): 500 INJECTION, POWDER, FOR SOLUTION INTRAMUSCULAR; INTRAVENOUS at 05:56

## 2019-12-05 RX ADMIN — Medication 650 MILLIGRAM(S): at 15:26

## 2019-12-05 RX ADMIN — CALCITRIOL 0.25 MICROGRAM(S): 0.5 CAPSULE ORAL at 11:01

## 2019-12-05 RX ADMIN — SODIUM CHLORIDE 1000 MILLILITER(S): 9 INJECTION, SOLUTION INTRAVENOUS at 15:20

## 2019-12-05 RX ADMIN — PANTOPRAZOLE SODIUM 40 MILLIGRAM(S): 20 TABLET, DELAYED RELEASE ORAL at 06:54

## 2019-12-05 RX ADMIN — OXYCODONE HYDROCHLORIDE 10 MILLIGRAM(S): 5 TABLET ORAL at 16:54

## 2019-12-05 RX ADMIN — HEPARIN SODIUM 5000 UNIT(S): 5000 INJECTION INTRAVENOUS; SUBCUTANEOUS at 05:56

## 2019-12-05 RX ADMIN — Medication 100 GRAM(S): at 16:47

## 2019-12-05 NOTE — H&P ADULT - NSICDXPASTMEDICALHX_GEN_ALL_CORE_FT
PAST MEDICAL HISTORY:  Chronic renal disease sees md twice a year    Hypertension PAST MEDICAL HISTORY:  Chronic back pain opioid dependence    Chronic renal disease unknown severity    Hypertension

## 2019-12-05 NOTE — H&P ADULT - NSHPPHYSICALEXAM_GEN_ALL_CORE
Vital Signs Last 24 Hrs  T(C): 37.1 (05 Dec 2019 00:53), Max: 38.9 (04 Dec 2019 19:59)  T(F): 98.8 (05 Dec 2019 00:53), Max: 102.1 (04 Dec 2019 21:18)  HR: 88 (05 Dec 2019 00:53) (88 - 103)  BP: 106/60 (05 Dec 2019 00:53) (91/65 - 113/72)  BP(mean): --  RR: 20 (05 Dec 2019 00:53) (20 - 30)  SpO2: 90% (05 Dec 2019 00:53) (84% - 96%)

## 2019-12-05 NOTE — H&P ADULT - PROBLEM SELECTOR PLAN 3
Cr elevated to 7 from 1.4 in 10/2019.  - F/u renal US  - Encephalopathy more likely from sepsis vs uremia.  - s/p 2 L IVF.   - Renally dose antibiotics Cr elevated to 7 from 1.4 in 10/2019. Has unknown level of CKD.  - F/u renal US  - Encephalopathy may be secondary to sepsis and uremia  - s/p 2 L IVF.   - Renally dose antibiotics  - nephrology consult in the AM.  --trend BmP and assess response to IVF.  --hold home diuretic, ACE-I

## 2019-12-05 NOTE — H&P ADULT - PROBLEM SELECTOR PLAN 8
Patient and wife unsure about medications. Will need to verify medications with pharmacy. Patient goes to [facility] on [address]. Unverified med rec done with pharmacy records from other sources.

## 2019-12-05 NOTE — H&P ADULT - PROBLEM SELECTOR PLAN 4
DVT ppx: HSQ  Renal diet Normocytic anemia; Hgb dropped from 12 (in 9/2019) to 9.  - Recommend outpatient cancer screening. --may be due to sepsis vs uremia, likely multifactorial  --frequent reorientation  --treatement as above

## 2019-12-05 NOTE — PROGRESS NOTE ADULT - PROBLEM SELECTOR PLAN 7
--with chronic pain and opioid dependence [facility] [phone]  --given mild encephalopathy, will reduce dose of oxycodone from 30mg to 10mg, hold for sedation. - with chronic pain and opioid dependence [facility] [phone]  - given mild encephalopathy, will reduce dose of oxycodone from 30mg to 10mg, hold for sedation.

## 2019-12-05 NOTE — H&P ADULT - PROBLEM SELECTOR PLAN 5
Normocytic anemia; Hgb dropped from 12 (in 9/2019) to 9.  - Recommend outpatient cancer screening. Holding anti-HTN medications (coreg and amlodipine) d/t borderline BP. Normocytic anemia; Hgb decreased from 12 (in 9/2019) to 9.  --patient denies hematuria, melena, hematochezia  --may be secondary to CKD  --will send iron studies, other anemia w/u, trend CBC

## 2019-12-05 NOTE — H&P ADULT - PROBLEM SELECTOR PLAN 7
21 DVT ppx: HSQ  Renal diet --with chronic pain and opioid dependence [facility] [phone]  --given mild encephalopathy, will reduce dose of oxycodone from 30mg to 10mg, hold for sedation.

## 2019-12-05 NOTE — DISCHARGE NOTE NURSING/CASE MANAGEMENT/SOCIAL WORK - NSDCPEEMAIL_GEN_ALL_CORE
[facility] for Tobacco Control email [email or web address]

## 2019-12-05 NOTE — PROGRESS NOTE ADULT - PROBLEM SELECTOR PLAN 4
--may be due to sepsis vs uremia, likely multifactorial  --frequent reorientation  --treatement as above - may be due to sepsis vs uremia, likely multifactorial  - frequent reorientation  - treatement of underlying PNA/sepsis and FLORENTIN as above
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
## 2019-12-05 NOTE — PROGRESS NOTE ADULT - PROBLEM SELECTOR PLAN 8
Patient and wife unsure about medications. Will need to verify medications with pharmacy. Patient goes to [facility] on [address]. Unverified med rec done with pharmacy records from other sources. Patient's wife brought in meds from home today; pharmacy also contacted by [facility] pharmacy staff and med rec updated.

## 2019-12-05 NOTE — H&P ADULT - PROBLEM SELECTOR PLAN 6
Patient and wife unsure about medications. Will need to verify medications with pharmacy. Patient goes to [facility] on [address]. Holding anti-HTN medications in the setting of low blood pressure with infection.

## 2019-12-05 NOTE — H&P ADULT - PROBLEM SELECTOR PLAN 1
Sepsis for pna. S/p vanc zosyn  - Will c/w ctx and azithromycin  - F/u blood and urine cultures --CXR findings as above  --will cover with CTX and azithromycin  --F/u urine legionella
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
## 2019-12-05 NOTE — PATIENT PROFILE ADULT - ANY IMPAIRED UPPER EXTREMITY FUNCTION WITHIN A WEEK PRIOR TO ADMISSION RELATED TO?
I talked to Dr Angelina Alonzo who continue to not wish to take him on. I dont have any other pain doctors unless he has not seen Dr Obrien Rastafarian. no

## 2019-12-05 NOTE — DISCHARGE NOTE NURSING/CASE MANAGEMENT/SOCIAL WORK - NSDCPEWEB_GEN_ALL_CORE
[facility] for Tobacco Control website --- [email or web address] website --- [email or web address]

## 2019-12-05 NOTE — PHARMACOTHERAPY INTERVENTION NOTE - COMMENTS
Confirmed home medications with patient and pharmacy. Patient is on Amlodipine 10mg daily, Carvediolol 3.125mg twice daily, Lasix 40mg daily, Lisinopril 10mg daily, and Nifedipine XL 90mg daily for hypertension, all of which are on hold inpatient at the moment. Patient does not report to be taking any pain medications prior to admission other than oxycodone 30mg every 6 hours as needed, verified on iSTOP. Patient reports adherence to his medications at home - states he lays out his medications daily on a tray. Please refer to Outpatient Medication Review for full comprehensive list.    Uma Samuel, PharmD  PGY1 Pharmacy Practice Resident  [phone]
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
## 2019-12-05 NOTE — DISCHARGE NOTE NURSING/CASE MANAGEMENT/SOCIAL WORK - PATIENT PORTAL LINK FT
You can access the FollowMyHealth Patient Portal offered by [facility] by registering at the following website: [email or web address]. By joining [employer]’s FollowMyHealth portal, you will also be able to view your health information using other applications (apps) compatible with our system.
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
## 2019-12-05 NOTE — PROGRESS NOTE ADULT - PROBLEM SELECTOR PLAN 2
Severe Sepsis due to PNA, with associated metabolic encephalopathy and FLORENTIN  - Will c/w ctx and azithromycin  - F/u blood and urine cultures  -received IVF resuscitation Severe Sepsis due to PNA, with associated metabolic encephalopathy and FLORENTIN  - c/w ctx as above  - F/u blood and urine cultures  - IVF resuscitation as needed for sepsis and FLORENTIN  - tylenol prn for fever

## 2019-12-05 NOTE — H&P ADULT - NEGATIVE GASTROINTESTINAL SYMPTOMS
no abdominal pain/no vomiting/no nausea no abdominal pain/no melena/no hematochezia/no vomiting/no nausea

## 2019-12-05 NOTE — PROGRESS NOTE ADULT - PROBLEM SELECTOR PLAN 3
Cr elevated to 7 from 1.4 in 10/2019. Has unknown level of CKD.  - F/u renal US  - Encephalopathy may be secondary to sepsis and uremia  - s/p 2 L IVF.   - Renally dose antibiotics  - nephrology consult in the AM.  --trend BmP and assess response to IVF.  --hold home diuretic, ACE-I Cr elevated to 7 from 1.4 in 10/2019. Has unknown level of CKD.  - renal US with no evidence hydronephrosis, urine lytes suggesting pre-renal  - Encephalopathy may be secondary to sepsis and uremia  - s/p 2 L IVF in ED   - Renally dose antibiotics  - nephrology consulted, given additional 500cc LR with further recs pending  - trend BmP and assess response to IVF.  - hold home diuretic, ACE-I, re-add as tolerated

## 2019-12-05 NOTE — H&P ADULT - ATTENDING COMMENTS
Patient seen and examined. Agree with resident note above, edited where appropriate. Patient seen and examined. Agree with resident note above, edited where appropriate. Presents with fever, cough, confusion. Found to have severe sepsis 2/2 pneumonia as source. With associated metabolic encephalopathy. Also found to have acute on chronic renal failure, likely from infection while on numerous diuretics/ACE-I. Heredia placed, making urine. Check renal US. Nephrology consult in the AM. C/w CTX/azithro for PNA. Encephalopathy seems to have improvement from initial eval in the ED.

## 2019-12-05 NOTE — PROGRESS NOTE ADULT - PROBLEM SELECTOR PLAN 5
Normocytic anemia; Hgb decreased from 12 (in 9/2019) to 9.  --patient denies hematuria, melena, hematochezia  --may be secondary to CKD  --will send iron studies, other anemia w/u, trend CBC Normocytic anemia; Hgb decreased from 12 (in 9/2019) to 9.  - patient denies hematuria, melena, hematochezia  - may be secondary to CKD  --will send iron studies, other anemia w/u, trend CBC

## 2019-12-05 NOTE — PROGRESS NOTE ADULT - SUBJECTIVE AND OBJECTIVE BOX
PROGRESS NOTE:   ************************************************  Authored by: Dionicio Harvey MD PGY1  Internal Medicine  Pager: [facility]: [phone]  *************************************************    Patient is a 74y old  Male who presents with a chief complaint of SOB (05 Dec 2019 03:47)      SUBJECTIVE / OVERNIGHT EVENTS: The patient was seen and examined at bedside.     REVIEW OF SYSTEMS:    CONSTITUTIONAL: No weakness, fevers or chills  EYES/ENT: No visual changes;  No vertigo or throat pain   NECK: No pain or stiffness  RESPIRATORY: No cough, wheezing, hemoptysis; No shortness of breath  CARDIOVASCULAR: No chest pain or palpitations  GASTROINTESTINAL: No abdominal or epigastric pain. No nausea, vomiting, or hematemesis; No diarrhea or constipation. No melena or hematochezia.  GENITOURINARY: No dysuria, frequency or hematuria  NEUROLOGICAL: No numbness or weakness  SKIN: No itching, rashes    MEDICATIONS  (STANDING):  azithromycin  IVPB 500 milliGRAM(s) IV Intermittent every 24 hours  calcitriol   Capsule 0.25 MICROGram(s) Oral daily  cefTRIAXone   IVPB 1000 milliGRAM(s) IV Intermittent every 24 hours  heparin  Injectable 5000 Unit(s) SubCutaneous every 12 hours  pantoprazole    Tablet 40 milliGRAM(s) Oral before breakfast    MEDICATIONS  (PRN):  oxyCODONE    IR 10 milliGRAM(s) Oral every 6 hours PRN moderate and severe pain      CAPILLARY BLOOD GLUCOSE        I&O's Summary      PHYSICAL EXAM:  Vital Signs Last 24 Hrs  T(C): 37.7 (05 Dec 2019 06:27), Max: 38.9 (04 Dec 2019 19:59)  T(F): 99.9 (05 Dec 2019 06:27), Max: 102.1 (04 Dec 2019 21:18)  HR: 96 (05 Dec 2019 06:27) (88 - 103)  BP: 103/66 (05 Dec 2019 06:27) (91/65 - 113/72)  BP(mean): --  RR: 20 (05 Dec 2019 06:27) (20 - 30)  SpO2: 91% (05 Dec 2019 06:27) (84% - 96%)    CONSTITUTIONAL: NAD, resting comfortably  RESPIRATORY: Normal respiratory effort; lungs are clear to auscultation bilaterally; no wheezes/rales/rhonchi  CARDIOVASCULAR: Regular rate and rhythm, normal S1 and S2, no murmur/rub/gallop;  ABDOMEN: Nontender to palpation, normoactive bowel sounds, no rebound/guarding; No hepatosplenomegaly  EXTREMITIES: No edema; 2+ peripheral pulses; no clubbing or cyanosis of digits; no joint swelling or tenderness to palpation  NEURO/PSYCH: A+Ox3; affect appropriate; no focal neuro deficits appreciated    LABS:                        8.0    9.38  )-----------( 233      ( 05 Dec 2019 06:02 )             23.5     12-05    133<L>  |  98  |  86<H>  ----------------------------<  155<H>  3.4<L>   |  20<L>  |  7.35<H>    Ca    7.4<L>      05 Dec 2019 06:02  Phos  4.0     12-  Mg     1.2     12-05    TPro  7.3  /  Alb  3.6  /  TBili  0.8  /  DBili  x   /  AST  28  /  ALT  21  /  AlkPhos  45  12-04    PT/INR - ( 04 Dec 2019 20:43 )   PT: 12.7 sec;   INR: 1.10 ratio         PTT - ( 04 Dec 2019 20:43 )  PTT:28.4 sec      Urinalysis Basic - ( 04 Dec 2019 23:40 )    Color: Yellow / Appearance: Turbid / S.017 / pH: x  Gluc: x / Ketone: Negative  / Bili: Small / Urobili: Negative   Blood: x / Protein: 100 mg/dL / Nitrite: Negative   Leuk Esterase: Negative / RBC: 3 /hpf / WBC 2 /HPF   Sq Epi: x / Non Sq Epi: 5 / Bacteria: Few      RADIOLOGY & ADDITIONAL TESTS:  Results Reviewed: Yes  Imaging Personally Reviewed: Yes  Electrocardiogram Personally Reviewed: Yes    COORDINATION OF CARE:  Care Discussed with Consultants/Other Providers [Y/N]: Y  Prior or Outpatient Records Reviewed [Y/N]: Y PROGRESS NOTE:   ************************************************  Authored by: Dionicio Harvey MD PGY1  Internal Medicine  Pager: [facility]: [phone]  *************************************************    Patient is a 74y old  Male who presents with a chief complaint of SOB (05 Dec 2019 03:47)      SUBJECTIVE / OVERNIGHT EVENTS: No acute events since admission last night. The patient was seen and examined at bedside. Patient mildly lethargic, but responds appropriately. States that he feels overall unwell; has had fever/chills, and continues to have some cough and SOB. Patient denies headache, chest pain, palpitations, abdominal pain, nausea/vomiting, dysuria, leg swelling/pain.       MEDICATIONS  (STANDING):  azithromycin  IVPB 500 milliGRAM(s) IV Intermittent every 24 hours  calcitriol   Capsule 0.25 MICROGram(s) Oral daily  cefTRIAXone   IVPB 1000 milliGRAM(s) IV Intermittent every 24 hours  heparin  Injectable 5000 Unit(s) SubCutaneous every 12 hours  pantoprazole    Tablet 40 milliGRAM(s) Oral before breakfast    MEDICATIONS  (PRN):  oxyCODONE    IR 10 milliGRAM(s) Oral every 6 hours PRN moderate and severe pain        PHYSICAL EXAM:  Vital Signs Last 24 Hrs  T(C): 37.7 (05 Dec 2019 06:27), Max: 38.9 (04 Dec 2019 19:59)  T(F): 99.9 (05 Dec 2019 06:27), Max: 102.1 (04 Dec 2019 21:18)  HR: 96 (05 Dec 2019 06:27) (88 - 103)  BP: 103/66 (05 Dec 2019 06:27) (91/65 - 113/72)  BP(mean): --  RR: 20 (05 Dec 2019 06:27) (20 - 30)  SpO2: 91% (05 Dec 2019 06:27) (84% - 96%)    CONSTITUTIONAL: mild distress, but resting in bed with NC; cachectic  RESPIRATORY: rales in lower right lung, otherwise CTAB, tachypneic  CARDIOVASCULAR: Regular rate and rhythm, normal S1 and S2, no murmur/rub/gallop;  ABDOMEN: Nontender to palpation, normoactive bowel sounds, no rebound/guarding; No hepatosplenomegaly  EXTREMITIES: No edema; 2+ peripheral pulses; no clubbing or cyanosis of digits; no joint swelling or tenderness to palpation  NEURO/PSYCH: A+Ox2, cannot state year; flat appropriate; no focal neuro deficits appreciated on brief neuro exam    LABS:                        8.0    9.38  )-----------( 233      ( 05 Dec 2019 06:02 )             23.5     12-    133<L>  |  98  |  86<H>  ----------------------------<  155<H>  3.4<L>   |  20<L>  |  7.35<H>    Ca    7.4<L>      05 Dec 2019 06:02  Phos  4.0     12-  Mg     1.2     12-    TPro  7.3  /  Alb  3.6  /  TBili  0.8  /  DBili  x   /  AST  28  /  ALT  21  /  AlkPhos  45  12-    PT/INR - ( 04 Dec 2019 20:43 )   PT: 12.7 sec;   INR: 1.10 ratio         PTT - ( 04 Dec 2019 20:43 )  PTT:28.4 sec      Urinalysis Basic - ( 04 Dec 2019 23:40 )    Color: Yellow / Appearance: Turbid / S.017 / pH: x  Gluc: x / Ketone: Negative  / Bili: Small / Urobili: Negative   Blood: x / Protein: 100 mg/dL / Nitrite: Negative   Leuk Esterase: Negative / RBC: 3 /hpf / WBC 2 /HPF   Sq Epi: x / Non Sq Epi: 5 / Bacteria: Few      RADIOLOGY & ADDITIONAL TESTS:  Results Reviewed: Yes  Imaging Personally Reviewed: Yes  Electrocardiogram Personally Reviewed: Yes    Xray Chest 1 View- PORTABLE-Urgent (19 @ 21:27)  IMPRESSION:   Patchy right lower lung opacity which may be compatible with pneumonia in   the appropriate clinical setting.    COORDINATION OF CARE:  Care Discussed with Consultants/Other Providers [Y/N]: Y  Prior or Outpatient Records Reviewed [Y/N]: Y

## 2019-12-05 NOTE — PROGRESS NOTE ADULT - PROBLEM SELECTOR PLAN 1
--CXR findings as above  --will cover with CTX and azithromycin  --F/u urine legionella - CXR and clinical symptoms suggestive of pneumonia  - initially covered with CTX and azithromycin  - urine legionella negative, will d/c azithromycin

## 2019-12-05 NOTE — H&P ADULT - NSHPLABSRESULTS_GEN_ALL_CORE
CBC Full  -  ( 04 Dec 2019 20:43 )  WBC Count : 8.90 K/uL  RBC Count : 2.92 M/uL  Hemoglobin : 8.0 g/dL  Hematocrit : 24.7 %  Platelet Count - Automated : 244 K/uL  Mean Cell Volume : 84.6 fl  Mean Cell Hemoglobin : 27.4 pg  Mean Cell Hemoglobin Concentration : 32.4 gm/dL  Auto Neutrophil # : 7.09 K/uL  Auto Lymphocyte # : 1.04 K/uL  Auto Monocyte # : 0.62 K/uL  Auto Eosinophil # : 0.00 K/uL  Auto Basophil # : 0.02 K/uL  Auto Neutrophil % : 79.6 %  Auto Lymphocyte % : 11.7 %  Auto Monocyte % : 7.0 %  Auto Eosinophil % : 0.0 %  Auto Basophil % : 0.2 %    12-04    133<L>  |  93<L>  |  83<H>  ----------------------------<  154<H>  3.3<L>   |  19<L>  |  7.17<H>    Ca    7.7<L>      04 Dec 2019 20:43    TPro  7.3  /  Alb  3.6  /  TBili  0.8  /  DBili  x   /  AST  28  /  ALT  21  /  AlkPhos  45  12-04 CBC Full  -  ( 04 Dec 2019 20:43 )  WBC Count : 8.90 K/uL  RBC Count : 2.92 M/uL  Hemoglobin : 8.0 g/dL  Hematocrit : 24.7 %  Platelet Count - Automated : 244 K/uL  Mean Cell Volume : 84.6 fl  Mean Cell Hemoglobin : 27.4 pg  Mean Cell Hemoglobin Concentration : 32.4 gm/dL  Auto Neutrophil # : 7.09 K/uL  Auto Lymphocyte # : 1.04 K/uL  Auto Monocyte # : 0.62 K/uL  Auto Eosinophil # : 0.00 K/uL  Auto Basophil # : 0.02 K/uL  Auto Neutrophil % : 79.6 %  Auto Lymphocyte % : 11.7 %  Auto Monocyte % : 7.0 %  Auto Eosinophil % : 0.0 %  Auto Basophil % : 0.2 %    12-04    133<L>  |  93<L>  |  83<H>  ----------------------------<  154<H>  3.3<L>   |  19<L>  |  7.17<H>    Ca    7.7<L>      04 Dec 2019 20:43    TPro  7.3  /  Alb  3.6  /  TBili  0.8  /  DBili  x   /  AST  28  /  ALT  21  /  AlkPhos  45  12-04    CXR:  Patchy right lower lung opacity which may be compatible   with pneumonia in the appropriate clinical setting. EKG, labs, and imaging personally reviewed and interpreted.     LABS:  CBC Full  -  ( 04 Dec 2019 20:43 )  WBC Count : 8.90 K/uL  RBC Count : 2.92 M/uL  Hemoglobin : 8.0 g/dL  Hematocrit : 24.7 %  Platelet Count - Automated : 244 K/uL  Mean Cell Volume : 84.6 fl  Mean Cell Hemoglobin : 27.4 pg  Mean Cell Hemoglobin Concentration : 32.4 gm/dL  Auto Neutrophil # : 7.09 K/uL  Auto Lymphocyte # : 1.04 K/uL  Auto Monocyte # : 0.62 K/uL  Auto Eosinophil # : 0.00 K/uL  Auto Basophil # : 0.02 K/uL  Auto Neutrophil % : 79.6 %  Auto Lymphocyte % : 11.7 %  Auto Monocyte % : 7.0 %  Auto Eosinophil % : 0.0 %  Auto Basophil % : 0.2 %    133<L>  |  93<L>  |  83<H>  ----------------------------<  154<H>  3.3<L>   |  19<L>  |  7.17<H>    Ca    7.7<L>      04 Dec 2019 20:43    TPro  7.3  /  Alb  3.6  /  TBili  0.8  /  DBili  x   /  AST  28  /  ALT  21  /  AlkPhos  45  12-04    IMAGING:  CXR:  Patchy right lower lung opacity which may be compatible   with pneumonia in the appropriate clinical setting.    Care discussed with other providers: Yes  Consult notes reviewed: Yes

## 2019-12-06 DIAGNOSIS — Z02.9 ENCOUNTER FOR ADMINISTRATIVE EXAMINATIONS, UNSPECIFIED: ICD-10-CM

## 2019-12-06 LAB
-  COAGULASE NEGATIVE STAPHYLOCOCCUS: SIGNIFICANT CHANGE UP
ANION GAP SERPL CALC-SCNC: 22 MMOL/L — HIGH (ref 5–17)
BUN SERPL-MCNC: 91 MG/DL — HIGH (ref 7–23)
CALCIUM SERPL-MCNC: 7.8 MG/DL — LOW (ref 8.4–10.5)
CHLORIDE SERPL-SCNC: 93 MMOL/L — LOW (ref 96–108)
CO2 SERPL-SCNC: 19 MMOL/L — LOW (ref 22–31)
CREAT SERPL-MCNC: 6.77 MG/DL — HIGH (ref 0.5–1.3)
CULTURE RESULTS: SIGNIFICANT CHANGE UP
GLUCOSE SERPL-MCNC: 140 MG/DL — HIGH (ref 70–99)
GRAM STN FLD: SIGNIFICANT CHANGE UP
HCT VFR BLD CALC: 26 % — LOW (ref 39–50)
HCV AB S/CO SERPL IA: 0.1 S/CO — SIGNIFICANT CHANGE UP (ref 0–0.99)
HCV AB SERPL-IMP: SIGNIFICANT CHANGE UP
HGB BLD-MCNC: 8.6 G/DL — LOW (ref 13–17)
HIV 1+2 AB+HIV1 P24 AG SERPL QL IA: SIGNIFICANT CHANGE UP
MAGNESIUM SERPL-MCNC: 1.6 MG/DL — SIGNIFICANT CHANGE UP (ref 1.6–2.6)
MCHC RBC-ENTMCNC: 28.1 PG — SIGNIFICANT CHANGE UP (ref 27–34)
MCHC RBC-ENTMCNC: 33.1 GM/DL — SIGNIFICANT CHANGE UP (ref 32–36)
MCV RBC AUTO: 85 FL — SIGNIFICANT CHANGE UP (ref 80–100)
METHOD TYPE: SIGNIFICANT CHANGE UP
ORGANISM # SPEC MICROSCOPIC CNT: SIGNIFICANT CHANGE UP
ORGANISM # SPEC MICROSCOPIC CNT: SIGNIFICANT CHANGE UP
PHOSPHATE SERPL-MCNC: 4.7 MG/DL — HIGH (ref 2.5–4.5)
PLATELET # BLD AUTO: 281 K/UL — SIGNIFICANT CHANGE UP (ref 150–400)
POTASSIUM SERPL-MCNC: 3.5 MMOL/L — SIGNIFICANT CHANGE UP (ref 3.5–5.3)
POTASSIUM SERPL-SCNC: 3.5 MMOL/L — SIGNIFICANT CHANGE UP (ref 3.5–5.3)
PROT ?TM UR-MCNC: 79 MG/DL — HIGH (ref 0–12)
RBC # BLD: 3.06 M/UL — LOW (ref 4.2–5.8)
RBC # FLD: 13.9 % — SIGNIFICANT CHANGE UP (ref 10.3–14.5)
SODIUM SERPL-SCNC: 134 MMOL/L — LOW (ref 135–145)
SPECIMEN SOURCE: SIGNIFICANT CHANGE UP
URATE UR-MCNC: 18.1 MG/DL — SIGNIFICANT CHANGE UP
UUN UR-MCNC: 523 MG/DL — SIGNIFICANT CHANGE UP
WBC # BLD: 10.52 K/UL — HIGH (ref 3.8–10.5)
WBC # FLD AUTO: 10.52 K/UL — HIGH (ref 3.8–10.5)

## 2019-12-06 PROCEDURE — 99233 SBSQ HOSP IP/OBS HIGH 50: CPT | Mod: GC

## 2019-12-06 PROCEDURE — 99232 SBSQ HOSP IP/OBS MODERATE 35: CPT | Mod: GC

## 2019-12-06 RX ORDER — HYDROMORPHONE HYDROCHLORIDE 2 MG/ML
0.25 INJECTION INTRAMUSCULAR; INTRAVENOUS; SUBCUTANEOUS EVERY 6 HOURS
Refills: 0 | Status: DISCONTINUED | OUTPATIENT
Start: 2019-12-06 | End: 2019-12-10

## 2019-12-06 RX ORDER — FERROUS SULFATE 325(65) MG
325 TABLET ORAL DAILY
Refills: 0 | Status: DISCONTINUED | OUTPATIENT
Start: 2019-12-06 | End: 2019-12-11

## 2019-12-06 RX ORDER — SODIUM CHLORIDE 9 MG/ML
1000 INJECTION, SOLUTION INTRAVENOUS
Refills: 0 | Status: DISCONTINUED | OUTPATIENT
Start: 2019-12-06 | End: 2019-12-06

## 2019-12-06 RX ADMIN — Medication 325 MILLIGRAM(S): at 11:36

## 2019-12-06 RX ADMIN — OXYCODONE HYDROCHLORIDE 10 MILLIGRAM(S): 5 TABLET ORAL at 05:41

## 2019-12-06 RX ADMIN — HEPARIN SODIUM 5000 UNIT(S): 5000 INJECTION INTRAVENOUS; SUBCUTANEOUS at 17:30

## 2019-12-06 RX ADMIN — HEPARIN SODIUM 5000 UNIT(S): 5000 INJECTION INTRAVENOUS; SUBCUTANEOUS at 05:39

## 2019-12-06 RX ADMIN — CALCITRIOL 0.25 MICROGRAM(S): 0.5 CAPSULE ORAL at 11:35

## 2019-12-06 RX ADMIN — PANTOPRAZOLE SODIUM 40 MILLIGRAM(S): 20 TABLET, DELAYED RELEASE ORAL at 05:39

## 2019-12-06 RX ADMIN — CEFTRIAXONE 100 MILLIGRAM(S): 500 INJECTION, POWDER, FOR SOLUTION INTRAMUSCULAR; INTRAVENOUS at 05:39

## 2019-12-06 RX ADMIN — OXYCODONE HYDROCHLORIDE 10 MILLIGRAM(S): 5 TABLET ORAL at 06:27

## 2019-12-06 NOTE — PROGRESS NOTE ADULT - PROBLEM SELECTOR PLAN 4
- may be due to sepsis vs uremia, likely multifactorial  - frequent reorientation  - treatement of underlying PNA/sepsis and FLORENTIN as above right normal/left normal

## 2019-12-06 NOTE — PROGRESS NOTE ADULT - SUBJECTIVE AND OBJECTIVE BOX
[facility] DIVISION OF KIDNEY DISEASES AND HYPERTENSION -- FOLLOW UP NOTE  --------------------------------------------------------------------------------    24 hour events/subjective: Patient seen and examined at the bedside. No acute overnight events. Patient endorses mild SOB. No CP/abdominal pain.        PAST HISTORY  --------------------------------------------------------------------------------  No significant changes to PMH, PSH, FHx, SHx, unless otherwise noted    ALLERGIES & MEDICATIONS  --------------------------------------------------------------------------------  Allergies    No Known Allergies    Intolerances      Standing Inpatient Medications  calcitriol   Capsule 0.25 MICROGram(s) Oral daily  cefTRIAXone   IVPB 1000 milliGRAM(s) IV Intermittent every 24 hours  ferrous    sulfate 325 milliGRAM(s) Oral daily  heparin  Injectable 5000 Unit(s) SubCutaneous every 12 hours  lactated ringers. 1000 milliLiter(s) IV Continuous <Continuous>  pantoprazole    Tablet 40 milliGRAM(s) Oral before breakfast    PRN Inpatient Medications  acetaminophen   Tablet .. 650 milliGRAM(s) Oral every 6 hours PRN  oxyCODONE    IR 10 milliGRAM(s) Oral every 6 hours PRN      REVIEW OF SYSTEMS  --------------------------------------------------------------------------------  Gen: + lethargy  Respiratory: +dyspnea  CV: No chest pain  GI: No abdominal pain/ diarrhea   MSK: No edema  Neuro: No dizziness  Heme: No bleeding    All other systems were reviewed and are negative, except as noted.      >>> <<<    VITALS/PHYSICAL EXAM  --------------------------------------------------------------------------------  T(C): 36.9 (12-06-19 @ 04:18), Max: 38.2 (12-05-19 @ 15:00)  HR: 85 (12-06-19 @ 04:18) (84 - 114)  BP: 129/77 (12-06-19 @ 04:18) (117/69 - 129/77)  RR: 17 (12-06-19 @ 04:18) (17 - 22)  SpO2: 92% (12-06-19 @ 04:18) (90% - 97%)  Wt(kg): --  Height (cm): 172.72 (12-04-19 @ 19:59)  Weight (kg): 77.1 (12-04-19 @ 19:59)  BMI (kg/m2): 25.8 (12-04-19 @ 19:59)  BSA (m2): 1.91 (12-04-19 @ 19:59)      12-05-19 @ 07:01  -  12-06-19 @ 07:00  --------------------------------------------------------  IN: 2260 mL / OUT: 650 mL / NET: 1610 mL      Physical Exam:    	Gen: NAD  	HEENT: Anicteric  	Pulm: Decreased BS in the R base. CTA in left. POCUS with increased B lines anteriorly, RLL consolidation  	CV: RRR, S1S2; no rub  	Abd: +BS, soft, nontender.       	: Heredia in place  	MSK: Warm,  no edema  	Neuro: No focal deficits, AOX3, mild asterixis       	Vascular Access:      LABS/STUDIES  --------------------------------------------------------------------------------              8.6    10.52 >-----------<  281      [12-06-19 @ 07:41]              26.0     134  |  93  |  91  ----------------------------<  140      [12-06-19 @ 06:25]  3.5   |  19  |  6.77        Ca     7.8     [12-06-19 @ 06:25]      Mg     1.6     [12-06-19 @ 06:25]      Phos  4.7     [12-06-19 @ 06:25]    TPro  7.3  /  Alb  3.6  /  TBili  0.8  /  DBili  x   /  AST  28  /  ALT  21  /  AlkPhos  45  [12-04-19 @ 20:43]    PT/INR: PT 12.7 , INR 1.10       [12-04-19 @ 20:43]  PTT: 28.4       [12-04-19 @ 20:43]      Creatinine Trend:  SCr 6.77 [12-06 @ 06:25]  SCr 7.35 [12-05 @ 06:02]  SCr 7.17 [12-04 @ 20:43]    Urinalysis - [12-04-19 @ 23:40]      Color Yellow / Appearance Turbid / SG 1.017 / pH 5.5      Gluc Negative / Ketone Negative  / Bili Small / Urobili Negative       Blood Negative / Protein 100 mg/dL / Leuk Est Negative / Nitrite Negative      RBC 3 / WBC 2 / Hyaline 16 / Gran 6 / Sq Epi  / Non Sq Epi 5 / Bacteria Few    Urine Creatinine 224      [12-05-19 @ 14:19]  Urine Protein 79      [12-05-19 @ 19:14]  Urine Sodium <35      [12-05-19 @ 14:19]  Urine Urea Nitrogen 523      [12-05-19 @ 19:07]  Urine Osmolality 358      [12-05-19 @ 14:19]    Iron 9, TIBC 198, %sat 5      [12-05-19 @ 08:42]  Ferritin 1601      [12-05-19 @ 08:40]

## 2019-12-06 NOTE — PROGRESS NOTE ADULT - PROBLEM SELECTOR PLAN 8
Patient's wife brought in meds from home today; pharmacy also contacted by [facility] pharmacy staff and med rec updated. DVT ppx: HSQ  Renal diet  Dispo: Uncertain at this time, pending PT eval

## 2019-12-06 NOTE — PROGRESS NOTE ADULT - SUBJECTIVE AND OBJECTIVE BOX
PROGRESS NOTE:   ************************************************  Authored by: Dionicio Harvey MD PGY1  Internal Medicine  Pager: [facility]: [phone]  *************************************************    Patient is a 74y old  Male who presents with a chief complaint of SOB (05 Dec 2019 15:51)      SUBJECTIVE / OVERNIGHT EVENTS: The patient was seen and examined at bedside.     REVIEW OF SYSTEMS:    CONSTITUTIONAL: No weakness, fevers or chills  EYES/ENT: No visual changes;  No vertigo or throat pain   NECK: No pain or stiffness  RESPIRATORY: No cough, wheezing, hemoptysis; No shortness of breath  CARDIOVASCULAR: No chest pain or palpitations  GASTROINTESTINAL: No abdominal or epigastric pain. No nausea, vomiting, or hematemesis; No diarrhea or constipation. No melena or hematochezia.  GENITOURINARY: No dysuria, frequency or hematuria  NEUROLOGICAL: No numbness or weakness  SKIN: No itching, rashes    MEDICATIONS  (STANDING):  calcitriol   Capsule 0.25 MICROGram(s) Oral daily  cefTRIAXone   IVPB 1000 milliGRAM(s) IV Intermittent every 24 hours  heparin  Injectable 5000 Unit(s) SubCutaneous every 12 hours  lactated ringers. 1000 milliLiter(s) (75 mL/Hr) IV Continuous <Continuous>  pantoprazole    Tablet 40 milliGRAM(s) Oral before breakfast    MEDICATIONS  (PRN):  acetaminophen   Tablet .. 650 milliGRAM(s) Oral every 6 hours PRN Temp greater or equal to 38C (100.4F)  oxyCODONE    IR 10 milliGRAM(s) Oral every 6 hours PRN moderate and severe pain      CAPILLARY BLOOD GLUCOSE        I&O's Summary    05 Dec 2019 07:01  -  06 Dec 2019 07:00  --------------------------------------------------------  IN: 2260 mL / OUT: 650 mL / NET: 1610 mL        PHYSICAL EXAM:  Vital Signs Last 24 Hrs  T(C): 36.9 (06 Dec 2019 04:18), Max: 38.2 (05 Dec 2019 15:00)  T(F): 98.4 (06 Dec 2019 04:18), Max: 100.8 (05 Dec 2019 15:00)  HR: 85 (06 Dec 2019 04:18) (84 - 114)  BP: 129/77 (06 Dec 2019 04:18) (117/69 - 129/77)  BP(mean): --  RR: 17 (06 Dec 2019 04:18) (17 - 22)  SpO2: 92% (06 Dec 2019 04:18) (90% - 97%)    CONSTITUTIONAL: mild distress, but resting in bed with NC; cachectic  RESPIRATORY: rales in lower right lung, otherwise CTAB, tachypneic  CARDIOVASCULAR: Regular rate and rhythm, normal S1 and S2, no murmur/rub/gallop;  ABDOMEN: Nontender to palpation, normoactive bowel sounds, no rebound/guarding; No hepatosplenomegaly  EXTREMITIES: No edema; 2+ peripheral pulses; no clubbing or cyanosis of digits; no joint swelling or tenderness to palpation  NEURO/PSYCH: A+Ox2, cannot state year; flat appropriate; no focal neuro deficits appreciated on brief neuro exam    LABS:                        8.0    9.38  )-----------( 233      ( 05 Dec 2019 06:02 )             23.5     12-06    134<L>  |  93<L>  |  91<H>  ----------------------------<  140<H>  3.5   |  19<L>  |  6.77<H>    Ca    7.8<L>      06 Dec 2019 06:25  Phos  4.7     12-  Mg     1.6     12-    TPro  7.3  /  Alb  3.6  /  TBili  0.8  /  DBili  x   /  AST  28  /  ALT  21  /  AlkPhos  45  12-04    PT/INR - ( 04 Dec 2019 20:43 )   PT: 12.7 sec;   INR: 1.10 ratio         PTT - ( 04 Dec 2019 20:43 )  PTT:28.4 sec      Urinalysis Basic - ( 04 Dec 2019 23:40 )    Color: Yellow / Appearance: Turbid / S.017 / pH: x  Gluc: x / Ketone: Negative  / Bili: Small / Urobili: Negative   Blood: x / Protein: 100 mg/dL / Nitrite: Negative   Leuk Esterase: Negative / RBC: 3 /hpf / WBC 2 /HPF   Sq Epi: x / Non Sq Epi: 5 / Bacteria: Few        Culture - Urine (collected 05 Dec 2019 03:07)  Source: .Urine Clean Catch (Midstream)  Final Report (05 Dec 2019 23:22):    No growth    Culture - Blood (collected 04 Dec 2019 22:50)  Source: .Blood Blood-Peripheral  Gram Stain (06 Dec 2019 00:04):    Growth in aerobic bottle: Gram Positive Cocci in Clusters  Preliminary Report (06 Dec 2019 00:04):    Growth in aerobic bottle: Gram Positive Cocci in Clusters    "Due to technical problems, Proteus sp. will Not be reported as part of    the BCID panel until further notice"    ***Blood Panel PCR results on this specimen are available    approximately 3 hours after the Gram stain result.***    Gram stain, PCR, and/or culture results may not always    correspond due to difference in methodologies.    ************************************************************    This PCR assay was performed using [employer].    The following targets are tested for: Enterococcus,    vancomycin resistant enterococci, Listeria monocytogenes,    coagulase negative staphylococci, S. aureus,    methicillin resistant S. aureus, Streptococcus agalactiae    (Group B), S. pneumoniae, S.pyogenes (Group A),    Acinetobacter baumannii, Enterobacter cloacae, E. coli,    Klebsiella oxytoca, K. pneumoniae, Proteus sp.,    Serratia marcescens, Haemophilus influenzae,    Neisseria meningitidis, Pseudomonas aeruginosa, Candida    albicans, C. glabrata, C krusei, C parapsilosis,    C. tropicalis and the KPC resistance gene.  Organism: Blood Culture PCR (06 Dec 2019 01:25)  Organism: Blood Culture PCR (06 Dec 2019 01:25)    Culture - Blood (collected 04 Dec 2019 22:50)  Source: .Blood Blood-Peripheral  Preliminary Report (05 Dec 2019 23:01):    No growth to date.        RADIOLOGY & ADDITIONAL TESTS:  Results Reviewed: Yes  Imaging Personally Reviewed: Yes  Electrocardiogram Personally Reviewed: Yes    COORDINATION OF CARE:  Care Discussed with Consultants/Other Providers [Y/N]: Y  Prior or Outpatient Records Reviewed [Y/N]: Y PROGRESS NOTE:   ************************************************  Authored by: Dionicio Harvey MD PGY1  Internal Medicine  Pager: [facility]: [phone]  *************************************************    Patient is a 74y old  Male who presents with a chief complaint of SOB (05 Dec 2019 15:51)      SUBJECTIVE / OVERNIGHT EVENTS: No acute events overnight. The patient was seen and examined at bedside. States he feels slightly better. Still has some cough, but doesn't feel feverish and denies SOB. Patient also denies chest pain, palpitations, abdominal pain, nausea/vomiting, dysuria. States he feels hungry and wants to try to eat today.      MEDICATIONS  (STANDING):  calcitriol   Capsule 0.25 MICROGram(s) Oral daily  cefTRIAXone   IVPB 1000 milliGRAM(s) IV Intermittent every 24 hours  heparin  Injectable 5000 Unit(s) SubCutaneous every 12 hours  lactated ringers. 1000 milliLiter(s) (75 mL/Hr) IV Continuous <Continuous>  pantoprazole    Tablet 40 milliGRAM(s) Oral before breakfast    MEDICATIONS  (PRN):  acetaminophen   Tablet .. 650 milliGRAM(s) Oral every 6 hours PRN Temp greater or equal to 38C (100.4F)  oxyCODONE    IR 10 milliGRAM(s) Oral every 6 hours PRN moderate and severe pain        I&O's Summary    05 Dec 2019 07:01  -  06 Dec 2019 07:00  --------------------------------------------------------  IN: 2260 mL / OUT: 650 mL / NET: 1610 mL        PHYSICAL EXAM:  Vital Signs Last 24 Hrs  T(C): 36.9 (06 Dec 2019 04:18), Max: 38.2 (05 Dec 2019 15:00)  T(F): 98.4 (06 Dec 2019 04:18), Max: 100.8 (05 Dec 2019 15:00)  HR: 85 (06 Dec 2019 04:18) (84 - 114)  BP: 129/77 (06 Dec 2019 04:18) (117/69 - 129/77)  BP(mean): --  RR: 17 (06 Dec 2019 04:18) (17 - 22)  SpO2: 92% (06 Dec 2019 04:18) (90% - 97%)    CONSTITUTIONAL: mild distress, but resting in bed with NC; cachectic  RESPIRATORY: rales in lower right lung present, slightly improved; otherwise CTAB, slightly tachypneic  CARDIOVASCULAR: Regular rate and rhythm, normal S1 and S2, no murmur/rub/gallop;  ABDOMEN: soft, mildly distended; nontender to palpation, normoactive bowel sounds, no rebound/guarding; No hepatosplenomegaly  EXTREMITIES: No edema; 2+ peripheral pulses; no clubbing or cyanosis of digits; no joint swelling or tenderness to palpation  NEURO/PSYCH: A+Ox2, cannot state year; flat appropriate; no focal neuro deficits appreciated on brief neuro exam    LABS:                        8.0    9.38  )-----------( 233      ( 05 Dec 2019 06:02 )             23.5     12-    134<L>  |  93<L>  |  91<H>  ----------------------------<  140<H>  3.5   |  19<L>  |  6.77<H>    Ca    7.8<L>      06 Dec 2019 06:25  Phos  4.7     12-  Mg     1.6     12-    TPro  7.3  /  Alb  3.6  /  TBili  0.8  /  DBili  x   /  AST  28  /  ALT  21  /  AlkPhos  45  12-04    PT/INR - ( 04 Dec 2019 20:43 )   PT: 12.7 sec;   INR: 1.10 ratio         PTT - ( 04 Dec 2019 20:43 )  PTT:28.4 sec      Urinalysis Basic - ( 04 Dec 2019 23:40 )    Color: Yellow / Appearance: Turbid / S.017 / pH: x  Gluc: x / Ketone: Negative  / Bili: Small / Urobili: Negative   Blood: x / Protein: 100 mg/dL / Nitrite: Negative   Leuk Esterase: Negative / RBC: 3 /hpf / WBC 2 /HPF   Sq Epi: x / Non Sq Epi: 5 / Bacteria: Few        Culture - Urine (collected 05 Dec 2019 03:07)  Source: .Urine Clean Catch (Midstream)  Final Report (05 Dec 2019 23:22):    No growth    Culture - Blood (collected 04 Dec 2019 22:50)  Source: .Blood Blood-Peripheral  Gram Stain (06 Dec 2019 00:04):    Growth in aerobic bottle: Gram Positive Cocci in Clusters  Preliminary Report (06 Dec 2019 00:04):    Growth in aerobic bottle: Gram Positive Cocci in Clusters    "Due to technical problems, Proteus sp. will Not be reported as part of    the BCID panel until further notice"    ***Blood Panel PCR results on this specimen are available    approximately 3 hours after the Gram stain result.***    Gram stain, PCR, and/or culture results may not always    correspond due to difference in methodologies.    ************************************************************    This PCR assay was performed using [employer].    The following targets are tested for: Enterococcus,    vancomycin resistant enterococci, Listeria monocytogenes,    coagulase negative staphylococci, S. aureus,    methicillin resistant S. aureus, Streptococcus agalactiae    (Group B), S. pneumoniae, S.pyogenes (Group A),    Acinetobacter baumannii, Enterobacter cloacae, E. coli,    Klebsiella oxytoca, K. pneumoniae, Proteus sp.,    Serratia marcescens, Haemophilus influenzae,    Neisseria meningitidis, Pseudomonas aeruginosa, Candida    albicans, C. glabrata, C krusei, C parapsilosis,    C. tropicalis and the KPC resistance gene.  Organism: Blood Culture PCR (06 Dec 2019 01:25)  Organism: Blood Culture PCR (06 Dec 2019 01:25)    Culture - Blood (collected 04 Dec 2019 22:50)  Source: .Blood Blood-Peripheral  Preliminary Report (05 Dec 2019 23:01):    No growth to date.        RADIOLOGY & ADDITIONAL TESTS:  Results Reviewed: Yes  Imaging Personally Reviewed: Yes  Electrocardiogram Personally Reviewed: Yes    COORDINATION OF CARE:  Care Discussed with Consultants/Other Providers [Y/N]: Y  Prior or Outpatient Records Reviewed [Y/N]: Y

## 2019-12-06 NOTE — PROGRESS NOTE ADULT - PROBLEM SELECTOR PLAN 7
- with chronic pain and opioid dependence [facility] [phone]  - given mild encephalopathy, will reduce dose of oxycodone from 30mg to 10mg, hold for sedation.
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
## 2019-12-06 NOTE — PROGRESS NOTE ADULT - PROBLEM SELECTOR PLAN 5
Normocytic anemia; Hgb decreased from 12 (in 9/2019) to 9.  - patient denies hematuria, melena, hematochezia  - may be secondary to CKD  --will send iron studies, other anemia w/u, trend CBC

## 2019-12-06 NOTE — PROGRESS NOTE ADULT - PROBLEM SELECTOR PLAN 3
Cr elevated to 7 from 1.4 in 10/2019. Has unknown level of CKD.  - renal US with no evidence hydronephrosis, urine lytes suggesting pre-renal  - Encephalopathy may be secondary to sepsis and uremia  - s/p 2 L IVF in ED   - Renally dose antibiotics  - nephrology consulted, given additional 500cc LR with further recs pending  - trend BmP and assess response to IVF.  - hold home diuretic, ACE-I, re-add as tolerated Cr elevated to 7 from 1.4 in 10/2019. Has unknown level of CKD.  - slight improvement to Cr 6.77 today  - renal US with no evidence hydronephrosis, urine lytes suggesting pre-renal  - Encephalopathy may be secondary to sepsis and uremia  - s/p 2 L IVF in ED   - Renally dose antibiotics  - received 500cc bolus yesterday  - nephrology following, f/u neprho recs  - trend BmP and assess response to IVF.  - hold home diuretic, ACE-I, re-add as tolerated

## 2019-12-06 NOTE — PROGRESS NOTE ADULT - PROBLEM SELECTOR PLAN 1
- CXR and clinical symptoms suggestive of pneumonia  - initially covered with CTX and azithromycin  - urine legionella negative, will d/c azithromycin - CXR and clinical symptoms suggestive of pneumonia  - initially covered with CTX and azithromycin  - urine legionella negative, azithromycin d/c'd

## 2019-12-06 NOTE — PROGRESS NOTE ADULT - PROBLEM SELECTOR PLAN 2
Severe Sepsis due to PNA, with associated metabolic encephalopathy and FLORENTIN  - c/w ctx as above  - F/u blood and urine cultures  - IVF resuscitation as needed for sepsis and FLORENTIN  - tylenol prn for fever Severe Sepsis due to PNA, with associated metabolic encephalopathy and FLORENTIN  - c/w ctx as above  - F/u blood and urine cultures  - IVF resuscitation as needed for sepsis and FLORENTIN  - IVF currently held b/c sadiely b lines seen on ultrasound, may continue tomorrow if patient decompensates  - tylenol prn for fever

## 2019-12-07 LAB
ANION GAP SERPL CALC-SCNC: 16 MMOL/L — SIGNIFICANT CHANGE UP (ref 5–17)
BUN SERPL-MCNC: 83 MG/DL — HIGH (ref 7–23)
CALCIUM SERPL-MCNC: 7.6 MG/DL — LOW (ref 8.4–10.5)
CHLORIDE SERPL-SCNC: 101 MMOL/L — SIGNIFICANT CHANGE UP (ref 96–108)
CO2 SERPL-SCNC: 19 MMOL/L — LOW (ref 22–31)
CREAT SERPL-MCNC: 5.35 MG/DL — HIGH (ref 0.5–1.3)
GLUCOSE SERPL-MCNC: 116 MG/DL — HIGH (ref 70–99)
HCT VFR BLD CALC: 24.7 % — LOW (ref 39–50)
HGB BLD-MCNC: 8 G/DL — LOW (ref 13–17)
MAGNESIUM SERPL-MCNC: 1.4 MG/DL — LOW (ref 1.6–2.6)
MCHC RBC-ENTMCNC: 28 PG — SIGNIFICANT CHANGE UP (ref 27–34)
MCHC RBC-ENTMCNC: 32.4 GM/DL — SIGNIFICANT CHANGE UP (ref 32–36)
MCV RBC AUTO: 86.4 FL — SIGNIFICANT CHANGE UP (ref 80–100)
PHOSPHATE SERPL-MCNC: 3 MG/DL — SIGNIFICANT CHANGE UP (ref 2.5–4.5)
PLATELET # BLD AUTO: 315 K/UL — SIGNIFICANT CHANGE UP (ref 150–400)
POTASSIUM SERPL-MCNC: 3.9 MMOL/L — SIGNIFICANT CHANGE UP (ref 3.5–5.3)
POTASSIUM SERPL-SCNC: 3.9 MMOL/L — SIGNIFICANT CHANGE UP (ref 3.5–5.3)
RBC # BLD: 2.86 M/UL — LOW (ref 4.2–5.8)
RBC # FLD: 13.8 % — SIGNIFICANT CHANGE UP (ref 10.3–14.5)
SODIUM SERPL-SCNC: 136 MMOL/L — SIGNIFICANT CHANGE UP (ref 135–145)
WBC # BLD: 6.87 K/UL — SIGNIFICANT CHANGE UP (ref 3.8–10.5)
WBC # FLD AUTO: 6.87 K/UL — SIGNIFICANT CHANGE UP (ref 3.8–10.5)

## 2019-12-07 PROCEDURE — 99232 SBSQ HOSP IP/OBS MODERATE 35: CPT | Mod: GC

## 2019-12-07 RX ORDER — MAGNESIUM SULFATE 500 MG/ML
1 VIAL (ML) INJECTION ONCE
Refills: 0 | Status: COMPLETED | OUTPATIENT
Start: 2019-12-07 | End: 2019-12-07

## 2019-12-07 RX ADMIN — CALCITRIOL 0.25 MICROGRAM(S): 0.5 CAPSULE ORAL at 11:01

## 2019-12-07 RX ADMIN — HEPARIN SODIUM 5000 UNIT(S): 5000 INJECTION INTRAVENOUS; SUBCUTANEOUS at 17:21

## 2019-12-07 RX ADMIN — Medication 325 MILLIGRAM(S): at 11:01

## 2019-12-07 RX ADMIN — HEPARIN SODIUM 5000 UNIT(S): 5000 INJECTION INTRAVENOUS; SUBCUTANEOUS at 05:17

## 2019-12-07 RX ADMIN — CEFTRIAXONE 100 MILLIGRAM(S): 500 INJECTION, POWDER, FOR SOLUTION INTRAMUSCULAR; INTRAVENOUS at 05:17

## 2019-12-07 RX ADMIN — Medication 100 GRAM(S): at 09:41

## 2019-12-07 RX ADMIN — PANTOPRAZOLE SODIUM 40 MILLIGRAM(S): 20 TABLET, DELAYED RELEASE ORAL at 05:17

## 2019-12-07 NOTE — PROGRESS NOTE ADULT - PROBLEM SELECTOR PLAN 1
- CXR and clinical symptoms suggestive of pneumonia  - initially covered with CTX and azithromycin  - urine legionella negative, azithromycin d/c'd - CXR and clinical symptoms suggestive of pneumonia  - legionella negative  - c/w ceftriaxone

## 2019-12-07 NOTE — PROGRESS NOTE ADULT - PROBLEM SELECTOR PLAN 7
- with chronic pain and opioid dependence [facility] [phone]  - given mild encephalopathy, will reduce dose of oxycodone from 30mg to 10mg, hold for sedation.

## 2019-12-07 NOTE — PROGRESS NOTE ADULT - SUBJECTIVE AND OBJECTIVE BOX
[facility] DIVISION OF KIDNEY DISEASES AND HYPERTENSION -- FOLLOW UP NOTE  --------------------------------------------------------------------------------    24 hour events/subjective: Patient seen and examined at the bedside. No acute overnight events. Patient endorses mild SOB. No CP/abdominal pain. Non oliguric ~900cc           PAST HISTORY  --------------------------------------------------------------------------------  No significant changes to PMH, PSH, FHx, SHx, unless otherwise noted    ALLERGIES & MEDICATIONS  --------------------------------------------------------------------------------  Allergies    No Known Allergies    Intolerances      Standing Inpatient Medications  calcitriol   Capsule 0.25 MICROGram(s) Oral daily  cefTRIAXone   IVPB 1000 milliGRAM(s) IV Intermittent every 24 hours  ferrous    sulfate 325 milliGRAM(s) Oral daily  heparin  Injectable 5000 Unit(s) SubCutaneous every 12 hours  pantoprazole    Tablet 40 milliGRAM(s) Oral before breakfast    PRN Inpatient Medications  acetaminophen   Tablet .. 650 milliGRAM(s) Oral every 6 hours PRN  HYDROmorphone  Injectable 0.25 milliGRAM(s) IV Push every 6 hours PRN      REVIEW OF SYSTEMS  --------------------------------------------------------------------------------  Gen: no lethargy  Respiratory: no dyspnea  CV: No chest pain  GI: No abdominal pain/ diarrhea   MSK: No edema  Neuro: No dizziness  Heme: No bleeding    VITALS/PHYSICAL EXAM  --------------------------------------------------------------------------------  T(C): 36.7 (12-07-19 @ 05:09), Max: 36.7 (12-07-19 @ 05:09)  HR: 93 (12-07-19 @ 05:09) (93 - 110)  BP: 132/79 (12-07-19 @ 05:09) (129/83 - 134/88)  RR: 17 (12-07-19 @ 05:09) (17 - 18)  SpO2: 92% (12-07-19 @ 05:09) (90% - 92%)  Wt(kg): --        12-06-19 @ 07:01  -  12-07-19 @ 07:00  --------------------------------------------------------  IN: 580 mL / OUT: 1500 mL / NET: -920 mL      Physical Exam:  	Gen: NAD  	HEENT: Anicteric  	Pulm: Decreased BS in the R base.  	CV: RRR, S1S2; no rub  	Abd: +BS, soft, nontender.       	: Giovanna in place  	MSK: Warm,  no edema  	Neuro: No focal deficits, AOX3,    LABS/STUDIES  --------------------------------------------------------------------------------              8.0    6.87  >-----------<  315      [12-07-19 @ 10:02]              24.7     136  |  101  |  83  ----------------------------<  116      [12-07-19 @ 07:01]  3.9   |  19  |  5.35        Ca     7.6     [12-07-19 @ 07:01]      Mg     1.4     [12-07-19 @ 07:01]      Phos  3.0     [12-07-19 @ 07:01]            Creatinine Trend:  SCr 5.35 [12-07 @ 07:01]  SCr 6.77 [12-06 @ 06:25]  SCr 7.35 [12-05 @ 06:02]  SCr 7.17 [12-04 @ 20:43]    Urinalysis - [12-04-19 @ 23:40]      Color Yellow / Appearance Turbid / SG 1.017 / pH 5.5      Gluc Negative / Ketone Negative  / Bili Small / Urobili Negative       Blood Negative / Protein 100 mg/dL / Leuk Est Negative / Nitrite Negative      RBC 3 / WBC 2 / Hyaline 16 / Gran 6 / Sq Epi  / Non Sq Epi 5 / Bacteria Few    Urine Creatinine 224      [12-05-19 @ 14:19]  Urine Protein 79      [12-05-19 @ 19:14]  Urine Sodium <35      [12-05-19 @ 14:19]  Urine Urea Nitrogen 523      [12-05-19 @ 19:07]  Urine Osmolality 358      [12-05-19 @ 14:19]    Iron 9, TIBC 198, %sat 5      [12-05-19 @ 08:42]  Ferritin 1601      [12-05-19 @ 08:40]    HCV 0.10, Nonreact      [12-06-19 @ 09:43]  HIV Nonreact      [12-06-19 @ 09:32]
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
## 2019-12-07 NOTE — PHYSICAL THERAPY INITIAL EVALUATION ADULT - GAIT TRAINING, PT EVAL
GOAL: Pt will be able to ambulate 400 feet without an assistive device  and independently in 4 weeks

## 2019-12-07 NOTE — PROGRESS NOTE ADULT - SUBJECTIVE AND OBJECTIVE BOX
Wendy Rodriguez MD PGY2   Pager [phone]    PROGRESS NOTE:     Patient is a 74y old  Male who presents with a chief complaint of SOB (06 Dec 2019 10:58)    SUBJECTIVE / OVERNIGHT EVENTS: No events overnight    ADDITIONAL REVIEW OF SYSTEMS:    MEDICATIONS  (STANDING):  calcitriol   Capsule 0.25 MICROGram(s) Oral daily  cefTRIAXone   IVPB 1000 milliGRAM(s) IV Intermittent every 24 hours  ferrous    sulfate 325 milliGRAM(s) Oral daily  heparin  Injectable 5000 Unit(s) SubCutaneous every 12 hours  pantoprazole    Tablet 40 milliGRAM(s) Oral before breakfast    MEDICATIONS  (PRN):  acetaminophen   Tablet .. 650 milliGRAM(s) Oral every 6 hours PRN Temp greater or equal to 38C (100.4F)  HYDROmorphone  Injectable 0.25 milliGRAM(s) IV Push every 6 hours PRN Severe Pain (7 - 10)      CAPILLARY BLOOD GLUCOSE        I&O's Summary    06 Dec 2019 07:01  -  07 Dec 2019 07:00  --------------------------------------------------------  IN: 580 mL / OUT: 1500 mL / NET: -920 mL        PHYSICAL EXAM:  Vital Signs Last 24 Hrs  T(C): 36.7 (07 Dec 2019 05:09), Max: 36.7 (07 Dec 2019 05:09)  T(F): 98 (07 Dec 2019 05:09), Max: 98 (07 Dec 2019 05:09)  HR: 93 (07 Dec 2019 05:09) (93 - 110)  BP: 132/79 (07 Dec 2019 05:09) (129/83 - 134/88)  BP(mean): --  RR: 17 (07 Dec 2019 05:09) (17 - 18)  SpO2: 92% (07 Dec 2019 05:09) (90% - 92%)    CONSTITUTIONAL: NAD, well-developed  RESPIRATORY: Normal respiratory effort; lungs are clear to auscultation bilaterally  CARDIOVASCULAR: Regular rate and rhythm, normal S1 and S2, no murmur/rub/gallop; No lower extremity edema; Peripheral pulses are 2+ bilaterally  ABDOMEN: Nontender to palpation, normoactive bowel sounds, no rebound/guarding; No hepatosplenomegaly  MUSCLOSKELETAL: no clubbing or cyanosis of digits; no joint swelling or tenderness to palpation  PSYCH: A+O to person, place, and time; affect appropriate    LABS:                        8.6    10.52 )-----------( 281      ( 06 Dec 2019 07:41 )             26.0     12-06    134<L>  |  93<L>  |  91<H>  ----------------------------<  140<H>  3.5   |  19<L>  |  6.77<H>    Ca    7.8<L>      06 Dec 2019 06:25  Phos  4.7     12-06  Mg     1.6     12-06                Culture - Urine (collected 05 Dec 2019 03:07)  Source: .Urine Clean Catch (Midstream)  Final Report (05 Dec 2019 23:22):    No growth    Culture - Blood (collected 04 Dec 2019 22:50)  Source: .Blood Blood-Peripheral  Gram Stain (06 Dec 2019 00:04):    Growth in aerobic bottle: Gram Positive Cocci in Clusters  Final Report (06 Dec 2019 20:04):    Growth in aerobic bottle: Coag Negative Staphylococcus    Coag Negative Staphylococcus    Single set isolate, possible contaminant. Contact    Microbiology if susceptibility testing clinically    indicated.    "Due to technical problems, Proteus sp. will Notbe reported as part of    the BCID panel until further notice"    ***Blood Panel PCR results on this specimen are available    approximately 3 hours after the Gram stain result.***    Gram stain, PCR, and/or culture results may not always    correspond due to difference in methodologies.    ************************************************************    This PCR assay was performed using [employer].    The following targets are tested for: Enterococcus,    vancomycin resistant enterococci, Listeria monocytogenes,    coagulase negative staphylococci, S. aureus,    methicillin resistant S. aureus, Streptococcus agalactiae    (Group B), S. pneumoniae, S. pyogenes (Group A),    Acinetobacter baumannii, Enterobacter cloacae, E. coli,    Klebsiella oxytoca, K. pneumoniae, Proteus sp.,    Serratia marcescens, Haemophilus influenzae,    Neisseria meningitidis, Pseudomonas aeruginosa, Candida    albicans, C. glabrata, C krusei, C parapsilosis,    C. tropicalis and the KPC resistance gene.  Organism: Blood Culture PCR (06 Dec 2019 20:04)  Organism: Blood Culture PCR (06 Dec 2019 20:04)    Culture - Blood (collected 04 Dec 2019 22:50)  Source: .Blood Blood-Peripheral  Preliminary Report (05 Dec 2019 23:01):    No growth to date.        RADIOLOGY & ADDITIONAL TESTS:  Results Reviewed:   Imaging Personally Reviewed:  Electrocardiogram Personally Reviewed:    COORDINATION OF CARE:  Care Discussed with Consultants/Other Providers [Y/N]:  Prior or Outpatient Records Reviewed [Y/N]: Wendy Rodriguez MD PGY2   Pager [phone]    PROGRESS NOTE:     Patient is a 74y old  Male who presents with a chief complaint of SOB (06 Dec 2019 10:58)    SUBJECTIVE / OVERNIGHT EVENTS: No events overnight. Pt states SOB is unchanged from yesterday. Denies cp, f/c, abdominal pain.     ADDITIONAL REVIEW OF SYSTEMS: otherwise negative    MEDICATIONS  (STANDING):  calcitriol   Capsule 0.25 MICROGram(s) Oral daily  cefTRIAXone   IVPB 1000 milliGRAM(s) IV Intermittent every 24 hours  ferrous    sulfate 325 milliGRAM(s) Oral daily  heparin  Injectable 5000 Unit(s) SubCutaneous every 12 hours  pantoprazole    Tablet 40 milliGRAM(s) Oral before breakfast    MEDICATIONS  (PRN):  acetaminophen   Tablet .. 650 milliGRAM(s) Oral every 6 hours PRN Temp greater or equal to 38C (100.4F)  HYDROmorphone  Injectable 0.25 milliGRAM(s) IV Push every 6 hours PRN Severe Pain (7 - 10)      CAPILLARY BLOOD GLUCOSE        I&O's Summary    06 Dec 2019 07:01  -  07 Dec 2019 07:00  --------------------------------------------------------  IN: 580 mL / OUT: 1500 mL / NET: -920 mL        PHYSICAL EXAM:  Vital Signs Last 24 Hrs  T(C): 36.7 (07 Dec 2019 05:09), Max: 36.7 (07 Dec 2019 05:09)  T(F): 98 (07 Dec 2019 05:09), Max: 98 (07 Dec 2019 05:09)  HR: 93 (07 Dec 2019 05:09) (93 - 110)  BP: 132/79 (07 Dec 2019 05:09) (129/83 - 134/88)  BP(mean): --  RR: 17 (07 Dec 2019 05:09) (17 - 18)  SpO2: 92% (07 Dec 2019 05:09) (90% - 92%)    CONSTITUTIONAL: NAD, sitting up in bed, on 4L NC  RESPIRATORY: Normal respiratory effort; lungs are clear to auscultation bilaterally  CARDIOVASCULAR: Regular rate and rhythm, normal S1 and S2, no murmur/rub/gallop; No lower extremity edema; Peripheral pulses are 2+ bilaterally  ABDOMEN: Nontender to palpation, normoactive bowel sounds, no rebound/guarding; No hepatosplenomegaly  MUSCLOSKELETAL: no clubbing or cyanosis of digits; no joint swelling or tenderness to palpation  PSYCH: A+O to person, place, and time; affect appropriate    LABS:                        8.6    10.52 )-----------( 281      ( 06 Dec 2019 07:41 )             26.0     12-06    134<L>  |  93<L>  |  91<H>  ----------------------------<  140<H>  3.5   |  19<L>  |  6.77<H>    Ca    7.8<L>      06 Dec 2019 06:25  Phos  4.7     12-06  Mg     1.6     12-06                Culture - Urine (collected 05 Dec 2019 03:07)  Source: .Urine Clean Catch (Midstream)  Final Report (05 Dec 2019 23:22):    No growth    Culture - Blood (collected 04 Dec 2019 22:50)  Source: .Blood Blood-Peripheral  Gram Stain (06 Dec 2019 00:04):    Growth in aerobic bottle: Gram Positive Cocci in Clusters  Final Report (06 Dec 2019 20:04):    Growth in aerobic bottle: Coag Negative Staphylococcus    Coag Negative Staphylococcus    Single set isolate, possible contaminant. Contact    Microbiology if susceptibility testing clinically    indicated.    "Due to technical problems, Proteus sp. will Notbe reported as part of    the BCID panel until further notice"    ***Blood Panel PCR results on this specimen are available    approximately 3 hours after the Gram stain result.***    Gram stain, PCR, and/or culture results may not always    correspond due to difference in methodologies.    ************************************************************    This PCR assay was performed using [employer].    The following targets are tested for: Enterococcus,    vancomycin resistant enterococci, Listeria monocytogenes,    coagulase negative staphylococci, S. aureus,    methicillin resistant S. aureus, Streptococcus agalactiae    (Group B), S. pneumoniae, S. pyogenes (Group A),    Acinetobacter baumannii, Enterobacter cloacae, E. coli,    Klebsiella oxytoca, K. pneumoniae, Proteus sp.,    Serratia marcescens, Haemophilus influenzae,    Neisseria meningitidis, Pseudomonas aeruginosa, Candida    albicans, C. glabrata, C krusei, C parapsilosis,    C. tropicalis and the KPC resistance gene.  Organism: Blood Culture PCR (06 Dec 2019 20:04)  Organism: Blood Culture PCR (06 Dec 2019 20:04)    Culture - Blood (collected 04 Dec 2019 22:50)  Source: .Blood Blood-Peripheral  Preliminary Report (05 Dec 2019 23:01):    No growth to date.        RADIOLOGY & ADDITIONAL TESTS:  Results Reviewed:   Imaging Personally Reviewed:  Electrocardiogram Personally Reviewed:    COORDINATION OF CARE:  Care Discussed with Consultants/Other Providers [Y/N]:  Prior or Outpatient Records Reviewed [Y/N]: Wendy Rodriguez MD PGY2   Pager [phone]    PROGRESS NOTE:     Patient is a 74y old  Male who presents with a chief complaint of SOB (06 Dec 2019 10:58)    SUBJECTIVE / OVERNIGHT EVENTS: No events overnight. Pt states SOB is unchanged from yesterday. Denies cp, f/c, abdominal pain.     ADDITIONAL REVIEW OF SYSTEMS: otherwise negative    MEDICATIONS  (STANDING):  calcitriol   Capsule 0.25 MICROGram(s) Oral daily  cefTRIAXone   IVPB 1000 milliGRAM(s) IV Intermittent every 24 hours  ferrous    sulfate 325 milliGRAM(s) Oral daily  heparin  Injectable 5000 Unit(s) SubCutaneous every 12 hours  pantoprazole    Tablet 40 milliGRAM(s) Oral before breakfast    MEDICATIONS  (PRN):  acetaminophen   Tablet .. 650 milliGRAM(s) Oral every 6 hours PRN Temp greater or equal to 38C (100.4F)  HYDROmorphone  Injectable 0.25 milliGRAM(s) IV Push every 6 hours PRN Severe Pain (7 - 10)      CAPILLARY BLOOD GLUCOSE        I&O's Summary    06 Dec 2019 07:01  -  07 Dec 2019 07:00  --------------------------------------------------------  IN: 580 mL / OUT: 1500 mL / NET: -920 mL        PHYSICAL EXAM:  Vital Signs Last 24 Hrs  T(C): 36.7 (07 Dec 2019 05:09), Max: 36.7 (07 Dec 2019 05:09)  T(F): 98 (07 Dec 2019 05:09), Max: 98 (07 Dec 2019 05:09)  HR: 93 (07 Dec 2019 05:09) (93 - 110)  BP: 132/79 (07 Dec 2019 05:09) (129/83 - 134/88)  BP(mean): --  RR: 17 (07 Dec 2019 05:09) (17 - 18)  SpO2: 92% (07 Dec 2019 05:09) (90% - 92%)    CONSTITUTIONAL: NAD, sitting up in bed, on 4L NC  RESPIRATORY: Normal respiratory effort; lungs are clear to auscultation bilaterally  CARDIOVASCULAR: Regular rate and rhythm, normal S1 and S2, no murmur/rub/gallop; No lower extremity edema; Peripheral pulses are 2+ bilaterally  ABDOMEN: Nontender to palpation, normoactive bowel sounds, no rebound/guarding; No hepatosplenomegaly  MUSCLOSKELETAL: no clubbing or cyanosis of digits; no joint swelling or tenderness to palpation  PSYCH: A+O to person, place, and time; affect appropriate    LABS:                          8.0    6.87  )-----------( 315      ( 07 Dec 2019 10:02 )             24.7                           8.6    10.52 )-----------( 281      ( 06 Dec 2019 07:41 )             26.0       12-07    136  |  101  |  83<H>  ----------------------------<  116<H>  3.9   |  19<L>  |  5.35<H>    Ca    7.6<L>      07 Dec 2019 07:01  Phos  3.0     12-07  Mg     1.4     12-07      12-06    134<L>  |  93<L>  |  91<H>  ----------------------------<  140<H>  3.5   |  19<L>  |  6.77<H>    Ca    7.8<L>      06 Dec 2019 06:25  Phos  4.7     12-06  Mg     1.6     12-06                Culture - Urine (collected 05 Dec 2019 03:07)  Source: .Urine Clean Catch (Midstream)  Final Report (05 Dec 2019 23:22):    No growth    Culture - Blood (collected 04 Dec 2019 22:50)  Source: .Blood Blood-Peripheral  Gram Stain (06 Dec 2019 00:04):    Growth in aerobic bottle: Gram Positive Cocci in Clusters  Final Report (06 Dec 2019 20:04):    Growth in aerobic bottle: Coag Negative Staphylococcus    Coag Negative Staphylococcus    Single set isolate, possible contaminant. Contact    Microbiology if susceptibility testing clinically    indicated.    "Due to technical problems, Proteus sp. will Notbe reported as part of    the BCID panel until further notice"    ***Blood Panel PCR results on this specimen are available    approximately 3 hours after the Gram stain result.***    Gram stain, PCR, and/or culture results may not always    correspond due to difference in methodologies.    ************************************************************    This PCR assay was performed using [employer].    The following targets are tested for: Enterococcus,    vancomycin resistant enterococci, Listeria monocytogenes,    coagulase negative staphylococci, S. aureus,    methicillin resistant S. aureus, Streptococcus agalactiae    (Group B), S. pneumoniae, S. pyogenes (Group A),    Acinetobacter baumannii, Enterobacter cloacae, E. coli,    Klebsiella oxytoca, K. pneumoniae, Proteus sp.,    Serratia marcescens, Haemophilus influenzae,    Neisseria meningitidis, Pseudomonas aeruginosa, Candida    albicans, C. glabrata, C krusei, C parapsilosis,    C. tropicalis and the KPC resistance gene.  Organism: Blood Culture PCR (06 Dec 2019 20:04)  Organism: Blood Culture PCR (06 Dec 2019 20:04)    Culture - Blood (collected 04 Dec 2019 22:50)  Source: .Blood Blood-Peripheral  Preliminary Report (05 Dec 2019 23:01):    No growth to date.        RADIOLOGY & ADDITIONAL TESTS:  Results Reviewed:   Imaging Personally Reviewed:  Electrocardiogram Personally Reviewed:    COORDINATION OF CARE:  Care Discussed with Consultants/Other Providers [Y/N]:  Prior or Outpatient Records Reviewed [Y/N]:

## 2019-12-07 NOTE — PROGRESS NOTE ADULT - PROBLEM SELECTOR PLAN 2
Severe Sepsis due to PNA, with associated metabolic encephalopathy and FLORENTIN  - c/w ctx as above  - F/u blood and urine cultures  - IVF resuscitation as needed for sepsis and FLORENTIN  - IVF currently held b/c sadiely b lines seen on ultrasound, may continue tomorrow if patient decompensates  - tylenol prn for fever Severe Sepsis due to PNA, with associated metabolic encephalopathy and FLORENTIN  - c/w ctx as above  - F/u blood and urine cultures. Bcx 12/4 w/ 1 bottle growing CNS, likely contaminant  - s/p IVF but with B lines noted on pocus on 12/6 so now held  - tylenol prn for fever

## 2019-12-07 NOTE — PHYSICAL THERAPY INITIAL EVALUATION ADULT - ADDITIONAL COMMENTS
Pt A&Ox4. Pt stated that he lives in a 1st level apartment with wife. Pt independent in all functional activities, ambulation, and ADL's without an assistive device.

## 2019-12-07 NOTE — PROGRESS NOTE ADULT - PROBLEM SELECTOR PLAN 4
- may be due to sepsis vs uremia, likely multifactorial  - frequent reorientation  - treatement of underlying PNA/sepsis and FLORENTIN as above

## 2019-12-07 NOTE — PROGRESS NOTE ADULT - PROBLEM SELECTOR PLAN 9
Transitions of Care Status:  1.  Name of PCP: Frederic Pineda  2.  PCP Contacted on Admission: [x] Y    [ ] N    3.  PCP contacted at Discharge: [ ] Y    [ ] N    [ ] N/A  4.  Post-Discharge Appointment Date and Location:  5.  Summary of Handoff given to PCP:

## 2019-12-07 NOTE — PROGRESS NOTE ADULT - PROBLEM SELECTOR PLAN 3
Cr elevated to 7 from 1.4 in 10/2019. Has unknown level of CKD.  - slight improvement to Cr 6.77 today  - renal US with no evidence hydronephrosis, urine lytes suggesting pre-renal  - Encephalopathy may be secondary to sepsis and uremia  - s/p 2 L IVF in ED   - Renally dose antibiotics  - received 500cc bolus yesterday  - nephrology following, f/u neprho recs  - trend BmP and assess response to IVF.  - hold home diuretic, ACE-I, re-add as tolerated Cr elevated to 7 from 1.4 in 10/2019. Has unknown level of CKD.  - Cr improving off IVF  - renal US with no evidence hydronephrosis, urine lytes suggesting pre-renal  - Encephalopathy may be secondary to sepsis and uremia  - s/p 2 L IVF in ED   - Renally dose antibiotics  - received 500cc bolus yesterday  - nephrology following, f/u neprho recs  - hold home diuretic, ACE-I, re-add as tolerated
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
## 2019-12-08 LAB
ANION GAP SERPL CALC-SCNC: 15 MMOL/L — SIGNIFICANT CHANGE UP (ref 5–17)
BUN SERPL-MCNC: 65 MG/DL — HIGH (ref 7–23)
CALCIUM SERPL-MCNC: 8 MG/DL — LOW (ref 8.4–10.5)
CHLORIDE SERPL-SCNC: 101 MMOL/L — SIGNIFICANT CHANGE UP (ref 96–108)
CO2 SERPL-SCNC: 23 MMOL/L — SIGNIFICANT CHANGE UP (ref 22–31)
CREAT SERPL-MCNC: 4.07 MG/DL — HIGH (ref 0.5–1.3)
GLUCOSE SERPL-MCNC: 107 MG/DL — HIGH (ref 70–99)
HCT VFR BLD CALC: 22.4 % — LOW (ref 39–50)
HCT VFR BLD CALC: 24.3 % — LOW (ref 39–50)
HGB BLD-MCNC: 7.1 G/DL — LOW (ref 13–17)
HGB BLD-MCNC: 7.6 G/DL — LOW (ref 13–17)
MAGNESIUM SERPL-MCNC: 1.6 MG/DL — SIGNIFICANT CHANGE UP (ref 1.6–2.6)
MCHC RBC-ENTMCNC: 27.4 PG — SIGNIFICANT CHANGE UP (ref 27–34)
MCHC RBC-ENTMCNC: 27.5 PG — SIGNIFICANT CHANGE UP (ref 27–34)
MCHC RBC-ENTMCNC: 31.3 GM/DL — LOW (ref 32–36)
MCHC RBC-ENTMCNC: 31.7 GM/DL — LOW (ref 32–36)
MCV RBC AUTO: 86.8 FL — SIGNIFICANT CHANGE UP (ref 80–100)
MCV RBC AUTO: 87.7 FL — SIGNIFICANT CHANGE UP (ref 80–100)
NRBC # BLD: 0 /100 WBCS — SIGNIFICANT CHANGE UP (ref 0–0)
PHOSPHATE SERPL-MCNC: 2.6 MG/DL — SIGNIFICANT CHANGE UP (ref 2.5–4.5)
PLATELET # BLD AUTO: 319 K/UL — SIGNIFICANT CHANGE UP (ref 150–400)
PLATELET # BLD AUTO: 378 K/UL — SIGNIFICANT CHANGE UP (ref 150–400)
POTASSIUM SERPL-MCNC: 4.3 MMOL/L — SIGNIFICANT CHANGE UP (ref 3.5–5.3)
POTASSIUM SERPL-SCNC: 4.3 MMOL/L — SIGNIFICANT CHANGE UP (ref 3.5–5.3)
RBC # BLD: 2.58 M/UL — LOW (ref 4.2–5.8)
RBC # BLD: 2.77 M/UL — LOW (ref 4.2–5.8)
RBC # FLD: 13.6 % — SIGNIFICANT CHANGE UP (ref 10.3–14.5)
RBC # FLD: 13.6 % — SIGNIFICANT CHANGE UP (ref 10.3–14.5)
SODIUM SERPL-SCNC: 139 MMOL/L — SIGNIFICANT CHANGE UP (ref 135–145)
WBC # BLD: 5.66 K/UL — SIGNIFICANT CHANGE UP (ref 3.8–10.5)
WBC # BLD: 6.09 K/UL — SIGNIFICANT CHANGE UP (ref 3.8–10.5)
WBC # FLD AUTO: 5.66 K/UL — SIGNIFICANT CHANGE UP (ref 3.8–10.5)
WBC # FLD AUTO: 6.09 K/UL — SIGNIFICANT CHANGE UP (ref 3.8–10.5)

## 2019-12-08 PROCEDURE — 99233 SBSQ HOSP IP/OBS HIGH 50: CPT | Mod: GC

## 2019-12-08 PROCEDURE — 99232 SBSQ HOSP IP/OBS MODERATE 35: CPT

## 2019-12-08 RX ADMIN — CALCITRIOL 0.25 MICROGRAM(S): 0.5 CAPSULE ORAL at 11:57

## 2019-12-08 RX ADMIN — Medication 325 MILLIGRAM(S): at 11:57

## 2019-12-08 RX ADMIN — HEPARIN SODIUM 5000 UNIT(S): 5000 INJECTION INTRAVENOUS; SUBCUTANEOUS at 05:30

## 2019-12-08 RX ADMIN — CEFTRIAXONE 100 MILLIGRAM(S): 500 INJECTION, POWDER, FOR SOLUTION INTRAMUSCULAR; INTRAVENOUS at 05:31

## 2019-12-08 RX ADMIN — PANTOPRAZOLE SODIUM 40 MILLIGRAM(S): 20 TABLET, DELAYED RELEASE ORAL at 05:30

## 2019-12-08 RX ADMIN — HEPARIN SODIUM 5000 UNIT(S): 5000 INJECTION INTRAVENOUS; SUBCUTANEOUS at 17:37

## 2019-12-08 NOTE — PROGRESS NOTE ADULT - PROBLEM SELECTOR PLAN 4
- may be due to sepsis vs uremia, likely multifactorial  - frequent reorientation  - treatement of underlying PNA/sepsis and FLORENTIN as above - may be due to sepsis vs uremia, likely multifactorial  - frequent reorientation  - treatement of underlying PNA/sepsis and FLORENTIN as above  - oxycodone for pain changed to hydromorphone due to concern of sedation from possible reduced oxy clearance in setting of FLORENTIN

## 2019-12-08 NOTE — PROGRESS NOTE ADULT - PROBLEM SELECTOR PLAN 3
Cr elevated to 7 from 1.4 in 10/2019. Has unknown level of CKD.  - Cr improving off IVF  - renal US with no evidence hydronephrosis, urine lytes suggesting pre-renal  - Encephalopathy may be secondary to sepsis and uremia  - s/p 2 L IVF in ED   - Renally dose antibiotics  - received 500cc bolus yesterday  - nephrology following, f/u neprho recs  - hold home diuretic, ACE-I, re-add as tolerated Cr elevated to 7 from 1.4 in 10/2019. Has unknown level of CKD.  - Cr continuing to improve, now 4.07; off IVF  - renal US with no evidence hydronephrosis, urine lytes suggesting pre-renal  - Encephalopathy may be secondary to sepsis and uremia  - s/p 2 L IVF in ED   - Renally dose antibiotics  - received 500cc bolus yesterday  - nephrology following, f/u neprho recs  - hold home diuretic, ACE-I, re-add as tolerated

## 2019-12-08 NOTE — PROGRESS NOTE ADULT - SUBJECTIVE AND OBJECTIVE BOX
PROGRESS NOTE:   ************************************************  Authored by: Dionicio Harvey MD PGY1  Internal Medicine  Pager: [facility]: [phone]  *************************************************    Patient is a 74y old  Male who presents with a chief complaint of SOB (07 Dec 2019 10:58)      SUBJECTIVE / OVERNIGHT EVENTS: The patient was seen and examined at bedside.     REVIEW OF SYSTEMS:    CONSTITUTIONAL: No weakness, fevers or chills  EYES/ENT: No visual changes;  No vertigo or throat pain   NECK: No pain or stiffness  RESPIRATORY: No cough, wheezing, hemoptysis; No shortness of breath  CARDIOVASCULAR: No chest pain or palpitations  GASTROINTESTINAL: No abdominal or epigastric pain. No nausea, vomiting, or hematemesis; No diarrhea or constipation. No melena or hematochezia.  GENITOURINARY: No dysuria, frequency or hematuria  NEUROLOGICAL: No numbness or weakness  SKIN: No itching, rashes    MEDICATIONS  (STANDING):  calcitriol   Capsule 0.25 MICROGram(s) Oral daily  cefTRIAXone   IVPB 1000 milliGRAM(s) IV Intermittent every 24 hours  ferrous    sulfate 325 milliGRAM(s) Oral daily  heparin  Injectable 5000 Unit(s) SubCutaneous every 12 hours  pantoprazole    Tablet 40 milliGRAM(s) Oral before breakfast    MEDICATIONS  (PRN):  acetaminophen   Tablet .. 650 milliGRAM(s) Oral every 6 hours PRN Temp greater or equal to 38C (100.4F)  HYDROmorphone  Injectable 0.25 milliGRAM(s) IV Push every 6 hours PRN Severe Pain (7 - 10)      CAPILLARY BLOOD GLUCOSE        I&O's Summary    07 Dec 2019 07:01  -  08 Dec 2019 07:00  --------------------------------------------------------  IN: 480 mL / OUT: 2525 mL / NET: -2045 mL        PHYSICAL EXAM:  Vital Signs Last 24 Hrs  T(C): 36.9 (08 Dec 2019 04:39), Max: 37.1 (07 Dec 2019 14:58)  T(F): 98.4 (08 Dec 2019 04:39), Max: 98.7 (07 Dec 2019 14:58)  HR: 85 (08 Dec 2019 04:39) (85 - 100)  BP: 137/78 (08 Dec 2019 04:39) (135/78 - 160/91)  BP(mean): --  RR: 17 (08 Dec 2019 04:39) (17 - 18)  SpO2: 94% (08 Dec 2019 04:39) (94% - 96%)    CONSTITUTIONAL: NAD, resting comfortably  RESPIRATORY: Normal respiratory effort; lungs are clear to auscultation bilaterally; no wheezes/rales/rhonchi  CARDIOVASCULAR: Regular rate and rhythm, normal S1 and S2, no murmur/rub/gallop;  ABDOMEN: Nontender to palpation, normoactive bowel sounds, no rebound/guarding; No hepatosplenomegaly  EXTREMITIES: No edema; 2+ peripheral pulses; no clubbing or cyanosis of digits; no joint swelling or tenderness to palpation  NEURO/PSYCH: A+Ox3; affect appropriate; no focal neuro deficits appreciated    LABS:                        8.0    6.87  )-----------( 315      ( 07 Dec 2019 10:02 )             24.7     12-07    136  |  101  |  83<H>  ----------------------------<  116<H>  3.9   |  19<L>  |  5.35<H>    Ca    7.6<L>      07 Dec 2019 07:01  Phos  3.0     12-07  Mg     1.4     12-07                  RADIOLOGY & ADDITIONAL TESTS:  Results Reviewed: Yes  Imaging Personally Reviewed: Yes  Electrocardiogram Personally Reviewed: Yes    COORDINATION OF CARE:  Care Discussed with Consultants/Other Providers [Y/N]: Y  Prior or Outpatient Records Reviewed [Y/N]: Y PROGRESS NOTE:   ************************************************  Authored by: Dinoicio Harvey MD PGY1  Internal Medicine  Pager: [facility]: [phone]  *************************************************    Patient is a 74y old  Male who presents with a chief complaint of SOB (07 Dec 2019 10:58)      SUBJECTIVE / OVERNIGHT EVENTS: No acute events overnight. The patient was seen and examined at bedside. States that he feels about the same as yesterday, possible improvement in his SOB, but states it is hard to tell, and continues to feel SOB. Patient denies fever/chills, headache, chest pain, palpitations, abdominal pain, nausea/vomiting, dysuria, changes in urinary or bowel habits, joint pain/swelling, weakness    REVIEW OF SYSTEMS:  As above, otherwise negative.    MEDICATIONS  (STANDING):  calcitriol   Capsule 0.25 MICROGram(s) Oral daily  cefTRIAXone   IVPB 1000 milliGRAM(s) IV Intermittent every 24 hours  ferrous    sulfate 325 milliGRAM(s) Oral daily  heparin  Injectable 5000 Unit(s) SubCutaneous every 12 hours  pantoprazole    Tablet 40 milliGRAM(s) Oral before breakfast    MEDICATIONS  (PRN):  acetaminophen   Tablet .. 650 milliGRAM(s) Oral every 6 hours PRN Temp greater or equal to 38C (100.4F)  HYDROmorphone  Injectable 0.25 milliGRAM(s) IV Push every 6 hours PRN Severe Pain (7 - 10)      I&O's Summary    07 Dec 2019 07:01  -  08 Dec 2019 07:00  --------------------------------------------------------  IN: 480 mL / OUT: 2525 mL / NET: -2045 mL        PHYSICAL EXAM:  Vital Signs Last 24 Hrs  T(C): 36.9 (08 Dec 2019 04:39), Max: 37.1 (07 Dec 2019 14:58)  T(F): 98.4 (08 Dec 2019 04:39), Max: 98.7 (07 Dec 2019 14:58)  HR: 85 (08 Dec 2019 04:39) (85 - 100)  BP: 137/78 (08 Dec 2019 04:39) (135/78 - 160/91)  BP(mean): --  RR: 17 (08 Dec 2019 04:39) (17 - 18)  SpO2: 94% (08 Dec 2019 04:39) (94% - 96%)    CONSTITUTIONAL: NAD, resting in bed, on 4L NC  RESPIRATORY: Normal respiratory effort; slightly decreased breath sounds in RLL  CARDIOVASCULAR: Regular rate and rhythm, normal S1 and S2, no murmur/rub/gallop; No lower extremity edema; Peripheral pulses are 2+ bilaterally  ABDOMEN: Nontender to palpation, normoactive bowel sounds, no rebound/guarding; No hepatosplenomegaly  MUSCLOSKELETAL: no clubbing or cyanosis of digits; no joint swelling or tenderness to palpation  NEURO/PSYCH: A+O x3; affect appropriate, no focal neuro deficits appreciated    LABS:                        8.0    6.87  )-----------( 315      ( 07 Dec 2019 10:02 )             24.7     12-07    136  |  101  |  83<H>  ----------------------------<  116<H>  3.9   |  19<L>  |  5.35<H>    Ca    7.6<L>      07 Dec 2019 07:01  Phos  3.0     12-07  Mg     1.4     12-07      RADIOLOGY & ADDITIONAL TESTS:  Results Reviewed: Yes  Imaging Personally Reviewed: Yes  Electrocardiogram Personally Reviewed: Yes    COORDINATION OF CARE:  Care Discussed with Consultants/Other Providers [Y/N]: Y  Prior or Outpatient Records Reviewed [Y/N]: Y

## 2019-12-08 NOTE — PROGRESS NOTE ADULT - PROBLEM SELECTOR PLAN 1
- CXR and clinical symptoms suggestive of pneumonia  - legionella negative  - c/w ceftriaxone - CXR and clinical symptoms suggestive of pneumonia  - legionella negative  - c/w ceftriaxone  - on O2, 4L NC, not on O2 at home; attempt to wean off O2

## 2019-12-08 NOTE — PROGRESS NOTE ADULT - PROBLEM SELECTOR PLAN 6
Holding anti-HTN medications in the setting of low blood pressure with infection. -Holding anti-HTN medications in the setting of low blood pressure with infection.  - readd as tolerated

## 2019-12-08 NOTE — PROGRESS NOTE ADULT - SUBJECTIVE AND OBJECTIVE BOX
[facility] DIVISION OF KIDNEY DISEASES AND HYPERTENSION -- FOLLOW UP NOTE  --------------------------------------------------------------------------------  Chief Complaint:  FLORENTIN    24 hour events/subjective:        PAST HISTORY  --------------------------------------------------------------------------------  No significant changes to PMH, PSH, FHx, SHx, unless otherwise noted    ALLERGIES & MEDICATIONS  --------------------------------------------------------------------------------  Allergies    No Known Allergies    Intolerances      Standing Inpatient Medications  calcitriol   Capsule 0.25 MICROGram(s) Oral daily  cefTRIAXone   IVPB 1000 milliGRAM(s) IV Intermittent every 24 hours  ferrous    sulfate 325 milliGRAM(s) Oral daily  heparin  Injectable 5000 Unit(s) SubCutaneous every 12 hours  pantoprazole    Tablet 40 milliGRAM(s) Oral before breakfast    PRN Inpatient Medications  acetaminophen   Tablet .. 650 milliGRAM(s) Oral every 6 hours PRN  HYDROmorphone  Injectable 0.25 milliGRAM(s) IV Push every 6 hours PRN      REVIEW OF SYSTEMS  --------------------------------------------------------------------------------  Gen: No weight changes, fatigue, fevers/chills, weakness  Skin: No rashes  Head/Eyes/Ears/Mouth: No headache; Normal hearing; Normal vision w/o blurriness; No sinus pain/discomfort, sore throat  Respiratory: No dyspnea, cough, wheezing, hemoptysis  CV: No chest pain, PND, orthopnea  GI: No abdominal pain, diarrhea, constipation, nausea, vomiting, melena, hematochezia  : No increased frequency, dysuria, hematuria, nocturia  MSK: No joint pain/swelling; no back pain; no edema  Neuro: No dizziness/lightheadedness, weakness, seizures, numbness, tingling  Heme: No easy bruising or bleeding  Endo: No heat/cold intolerance  Psych: No significant nervousness, anxiety, stress, depression    All other systems were reviewed and are negative, except as noted.    VITALS/PHYSICAL EXAM  --------------------------------------------------------------------------------  T(C): 36.9 (12-08-19 @ 04:39), Max: 37.1 (12-07-19 @ 14:58)  HR: 85 (12-08-19 @ 04:39) (85 - 100)  BP: 137/78 (12-08-19 @ 04:39) (135/78 - 160/91)  RR: 17 (12-08-19 @ 04:39) (17 - 18)  SpO2: 94% (12-08-19 @ 04:39) (94% - 96%)  Wt(kg): --        12-07-19 @ 07:01  -  12-08-19 @ 07:00  --------------------------------------------------------  IN: 480 mL / OUT: 2525 mL / NET: -2045 mL    PHYSICAL EXAM: vital signs as above  in no apparent distress  Neck: Supple, no JVD,    Lungs: no rhonchi, no wheeze, no crackles  CVS: S1 S2 no M/R/G  Abdomen: no tenderness, no organomegaly, BS present  Neuro: Grossly intact  Skin: warm, dry      LABS/STUDIES  --------------------------------------------------------------------------------              7.1    5.66  >-----------<  319      [12-08-19 @ 09:31]              22.4     139  |  101  |  65  ----------------------------<  107      [12-08-19 @ 07:07]  4.3   |  23  |  4.07        Ca     8.0     [12-08-19 @ 07:07]      Mg     1.6     [12-08-19 @ 07:07]      Phos  2.6     [12-08-19 @ 07:07]            Creatinine Trend:  SCr 4.07 [12-08 @ 07:07]  SCr 5.35 [12-07 @ 07:01]  SCr 6.77 [12-06 @ 06:25]  SCr 7.35 [12-05 @ 06:02]  SCr 7.17 [12-04 @ 20:43]    Urinalysis - [12-04-19 @ 23:40]      Color Yellow / Appearance Turbid / SG 1.017 / pH 5.5      Gluc Negative / Ketone Negative  / Bili Small / Urobili Negative       Blood Negative / Protein 100 mg/dL / Leuk Est Negative / Nitrite Negative      RBC 3 / WBC 2 / Hyaline 16 / Gran 6 / Sq Epi  / Non Sq Epi 5 / Bacteria Few    Urine Creatinine 224      [12-05-19 @ 14:19]  Urine Protein 79      [12-05-19 @ 19:14]  Urine Sodium <35      [12-05-19 @ 14:19]  Urine Urea Nitrogen 523      [12-05-19 @ 19:07]  Urine Osmolality 358      [12-05-19 @ 14:19]    Iron 9, TIBC 198, %sat 5      [12-05-19 @ 08:42]  Ferritin 1601      [12-05-19 @ 08:40]    HCV 0.10, Nonreact      [12-06-19 @ 09:43]  HIV Nonreact      [12-06-19 @ 09:32]

## 2019-12-08 NOTE — PROGRESS NOTE ADULT - PROBLEM SELECTOR PLAN 7
- with chronic pain and opioid dependence [facility] [phone]  - given mild encephalopathy, will reduce dose of oxycodone from 30mg to 10mg, hold for sedation. - with chronic pain and opioid dependence [facility] [phone]  - given mild encephalopathy and FLORENTIN, oxycodone changed to hydromorphone for less sedation  - consider changing back to oxycodone as patient's responsiveness improves and FLORENTIN resolves
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
## 2019-12-08 NOTE — PROGRESS NOTE ADULT - PROBLEM SELECTOR PLAN 2
Severe Sepsis due to PNA, with associated metabolic encephalopathy and FLORENTIN  - c/w ctx as above  - F/u blood and urine cultures. Bcx 12/4 w/ 1 bottle growing CNS, likely contaminant  - s/p IVF but with B lines noted on pocus on 12/6 so now held  - tylenol prn for fever Severe Sepsis due to PNA, with associated metabolic encephalopathy and FLORENTIN  - c/w ctx as above  - F/u blood and urine cultures. Bcx 12/4 w/ 1 bottle growing CNS, likely contaminant  - s/p IVF but with B lines noted on pocus on 12/6 so currently being held  - tylenol prn for fever

## 2019-12-09 LAB
ANION GAP SERPL CALC-SCNC: 13 MMOL/L — SIGNIFICANT CHANGE UP (ref 5–17)
BASOPHILS # BLD AUTO: 0.02 K/UL — SIGNIFICANT CHANGE UP (ref 0–0.2)
BASOPHILS NFR BLD AUTO: 0.3 % — SIGNIFICANT CHANGE UP (ref 0–2)
BLD GP AB SCN SERPL QL: NEGATIVE — SIGNIFICANT CHANGE UP
BUN SERPL-MCNC: 52 MG/DL — HIGH (ref 7–23)
CALCIUM SERPL-MCNC: 7.9 MG/DL — LOW (ref 8.4–10.5)
CHLORIDE SERPL-SCNC: 103 MMOL/L — SIGNIFICANT CHANGE UP (ref 96–108)
CO2 SERPL-SCNC: 23 MMOL/L — SIGNIFICANT CHANGE UP (ref 22–31)
CREAT SERPL-MCNC: 3.06 MG/DL — HIGH (ref 0.5–1.3)
CULTURE RESULTS: SIGNIFICANT CHANGE UP
EOSINOPHIL # BLD AUTO: 0.11 K/UL — SIGNIFICANT CHANGE UP (ref 0–0.5)
EOSINOPHIL NFR BLD AUTO: 1.9 % — SIGNIFICANT CHANGE UP (ref 0–6)
GLUCOSE SERPL-MCNC: 128 MG/DL — HIGH (ref 70–99)
HCT VFR BLD CALC: 22.4 % — LOW (ref 39–50)
HCT VFR BLD CALC: 25.6 % — LOW (ref 39–50)
HGB BLD-MCNC: 7.1 G/DL — LOW (ref 13–17)
HGB BLD-MCNC: 8.1 G/DL — LOW (ref 13–17)
IMM GRANULOCYTES NFR BLD AUTO: 1.7 % — HIGH (ref 0–1.5)
LYMPHOCYTES # BLD AUTO: 0.88 K/UL — LOW (ref 1–3.3)
LYMPHOCYTES # BLD AUTO: 14.9 % — SIGNIFICANT CHANGE UP (ref 13–44)
MAGNESIUM SERPL-MCNC: 1.4 MG/DL — LOW (ref 1.6–2.6)
MCHC RBC-ENTMCNC: 27.8 PG — SIGNIFICANT CHANGE UP (ref 27–34)
MCHC RBC-ENTMCNC: 28.7 PG — SIGNIFICANT CHANGE UP (ref 27–34)
MCHC RBC-ENTMCNC: 31.6 GM/DL — LOW (ref 32–36)
MCHC RBC-ENTMCNC: 31.7 GM/DL — LOW (ref 32–36)
MCV RBC AUTO: 87.8 FL — SIGNIFICANT CHANGE UP (ref 80–100)
MCV RBC AUTO: 90.8 FL — SIGNIFICANT CHANGE UP (ref 80–100)
MONOCYTES # BLD AUTO: 0.43 K/UL — SIGNIFICANT CHANGE UP (ref 0–0.9)
MONOCYTES NFR BLD AUTO: 7.3 % — SIGNIFICANT CHANGE UP (ref 2–14)
NEUTROPHILS # BLD AUTO: 4.35 K/UL — SIGNIFICANT CHANGE UP (ref 1.8–7.4)
NEUTROPHILS NFR BLD AUTO: 73.9 % — SIGNIFICANT CHANGE UP (ref 43–77)
NRBC # BLD: 0 /100 WBCS — SIGNIFICANT CHANGE UP (ref 0–0)
PHOSPHATE SERPL-MCNC: 2.2 MG/DL — LOW (ref 2.5–4.5)
PLATELET # BLD AUTO: 349 K/UL — SIGNIFICANT CHANGE UP (ref 150–400)
PLATELET # BLD AUTO: 409 K/UL — HIGH (ref 150–400)
POTASSIUM SERPL-MCNC: 3.7 MMOL/L — SIGNIFICANT CHANGE UP (ref 3.5–5.3)
POTASSIUM SERPL-SCNC: 3.7 MMOL/L — SIGNIFICANT CHANGE UP (ref 3.5–5.3)
RBC # BLD: 2.55 M/UL — LOW (ref 4.2–5.8)
RBC # BLD: 2.82 M/UL — LOW (ref 4.2–5.8)
RBC # FLD: 13.6 % — SIGNIFICANT CHANGE UP (ref 10.3–14.5)
RBC # FLD: 13.6 % — SIGNIFICANT CHANGE UP (ref 10.3–14.5)
RH IG SCN BLD-IMP: POSITIVE — SIGNIFICANT CHANGE UP
SODIUM SERPL-SCNC: 139 MMOL/L — SIGNIFICANT CHANGE UP (ref 135–145)
SPECIMEN SOURCE: SIGNIFICANT CHANGE UP
WBC # BLD: 5.73 K/UL — SIGNIFICANT CHANGE UP (ref 3.8–10.5)
WBC # BLD: 5.89 K/UL — SIGNIFICANT CHANGE UP (ref 3.8–10.5)
WBC # FLD AUTO: 5.73 K/UL — SIGNIFICANT CHANGE UP (ref 3.8–10.5)
WBC # FLD AUTO: 5.89 K/UL — SIGNIFICANT CHANGE UP (ref 3.8–10.5)

## 2019-12-09 PROCEDURE — 99233 SBSQ HOSP IP/OBS HIGH 50: CPT | Mod: GC

## 2019-12-09 RX ADMIN — PANTOPRAZOLE SODIUM 40 MILLIGRAM(S): 20 TABLET, DELAYED RELEASE ORAL at 06:17

## 2019-12-09 RX ADMIN — Medication 325 MILLIGRAM(S): at 12:14

## 2019-12-09 RX ADMIN — HEPARIN SODIUM 5000 UNIT(S): 5000 INJECTION INTRAVENOUS; SUBCUTANEOUS at 17:13

## 2019-12-09 RX ADMIN — HEPARIN SODIUM 5000 UNIT(S): 5000 INJECTION INTRAVENOUS; SUBCUTANEOUS at 05:27

## 2019-12-09 RX ADMIN — CEFTRIAXONE 100 MILLIGRAM(S): 500 INJECTION, POWDER, FOR SOLUTION INTRAMUSCULAR; INTRAVENOUS at 05:25

## 2019-12-09 RX ADMIN — CALCITRIOL 0.25 MICROGRAM(S): 0.5 CAPSULE ORAL at 12:14

## 2019-12-09 NOTE — PROGRESS NOTE ADULT - PROBLEM SELECTOR PLAN 3
Cr elevated to 7 from 1.4 in 10/2019. Has unknown level of CKD.  - Cr continuing to improve, now 4.07; off IVF  - renal US with no evidence hydronephrosis, urine lytes suggesting pre-renal  - Encephalopathy may be secondary to sepsis and uremia  - s/p 2 L IVF in ED   - Renally dose antibiotics  - received 500cc bolus yesterday  - nephrology following, f/u neprho recs  - hold home diuretic, ACE-I, re-add as tolerated Cr elevated to 7 from 1.4 in 10/2019. Has unknown level of CKD.  - renal US with no evidence hydronephrosis, urine lytes suggesting pre-renal  - Cr continuing to improve, now 3.06; off IVF  - Encephalopathy may be secondary to sepsis and uremia  - Renally dose antibiotics  - nephrology signed off yesterday; reconsult as needed  - hold home diuretic, ACE-I, re-add as tolerated

## 2019-12-09 NOTE — PROGRESS NOTE ADULT - PROBLEM SELECTOR PLAN 7
- with chronic pain and opioid dependence [facility] [phone]  - given mild encephalopathy and FLORENTIN, oxycodone changed to hydromorphone for less sedation  - consider changing back to oxycodone as patient's responsiveness improves and FLORENTIN resolves

## 2019-12-09 NOTE — PROGRESS NOTE ADULT - PROBLEM SELECTOR PLAN 5
Normocytic anemia; Hgb decreased from 12 (in 9/2019) to 9.  - patient denies hematuria, melena, hematochezia  - may be secondary to CKD  --will send iron studies, other anemia w/u, trend CBC Normocytic anemia; Hgb decreased from 12 (in 9/2019) to 9.  - patient denies hematuria, melena, hematochezia  - may be secondary to CKD  - will send iron studies, other anemia w/u, trend CBC  - Hgb 7.1 this morning, trend CBC regularly, stat CBC if decompensates

## 2019-12-09 NOTE — PROGRESS NOTE ADULT - PROBLEM SELECTOR PLAN 4
- may be due to sepsis vs uremia, likely multifactorial  - frequent reorientation  - treatement of underlying PNA/sepsis and FLORENTIN as above  - oxycodone for pain changed to hydromorphone due to concern of sedation from possible reduced oxy clearance in setting of FLORENTIN

## 2019-12-09 NOTE — PROGRESS NOTE ADULT - PROBLEM SELECTOR PLAN 6
-Holding anti-HTN medications in the setting of low blood pressure with infection.  - readd as tolerated

## 2019-12-09 NOTE — PROGRESS NOTE ADULT - PROBLEM SELECTOR PLAN 2
Severe Sepsis due to PNA, with associated metabolic encephalopathy and FLORENTIN  - c/w ctx as above  - F/u blood and urine cultures. Bcx 12/4 w/ 1 bottle growing CNS, likely contaminant  - s/p IVF but with B lines noted on pocus on 12/6 so currently being held  - tylenol prn for fever Severe Sepsis due to PNA, with associated metabolic encephalopathy and FLORENTIN  - c/w ctx as above  - F/u blood and urine cultures. Bcx 12/4 w/ 1 bottle growing coag neg staph, likely contaminant  - s/p IVF but with B lines noted on pocus on 12/6 so currently being held  - tylenol prn for fever

## 2019-12-09 NOTE — PROGRESS NOTE ADULT - SUBJECTIVE AND OBJECTIVE BOX
PROGRESS NOTE:   ************************************************  Authored by: Dionicio Harvey MD PGY1  Internal Medicine  Pager: [facility]: [phone]  *************************************************    Patient is a 74y old  Male who presents with a chief complaint of SOB (08 Dec 2019 13:23)      SUBJECTIVE / OVERNIGHT EVENTS: The patient was seen and examined at bedside.     REVIEW OF SYSTEMS:    CONSTITUTIONAL: No weakness, fevers or chills  EYES/ENT: No visual changes;  No vertigo or throat pain   NECK: No pain or stiffness  RESPIRATORY: No cough, wheezing, hemoptysis; No shortness of breath  CARDIOVASCULAR: No chest pain or palpitations  GASTROINTESTINAL: No abdominal or epigastric pain. No nausea, vomiting, or hematemesis; No diarrhea or constipation. No melena or hematochezia.  GENITOURINARY: No dysuria, frequency or hematuria  NEUROLOGICAL: No numbness or weakness  SKIN: No itching, rashes    MEDICATIONS  (STANDING):  calcitriol   Capsule 0.25 MICROGram(s) Oral daily  cefTRIAXone   IVPB 1000 milliGRAM(s) IV Intermittent every 24 hours  ferrous    sulfate 325 milliGRAM(s) Oral daily  heparin  Injectable 5000 Unit(s) SubCutaneous every 12 hours  pantoprazole    Tablet 40 milliGRAM(s) Oral before breakfast    MEDICATIONS  (PRN):  acetaminophen   Tablet .. 650 milliGRAM(s) Oral every 6 hours PRN Temp greater or equal to 38C (100.4F)  HYDROmorphone  Injectable 0.25 milliGRAM(s) IV Push every 6 hours PRN Severe Pain (7 - 10)      CAPILLARY BLOOD GLUCOSE        I&O's Summary    08 Dec 2019 07:01  -  09 Dec 2019 07:00  --------------------------------------------------------  IN: 890 mL / OUT: 1900 mL / NET: -1010 mL        PHYSICAL EXAM:  Vital Signs Last 24 Hrs  T(C): 37.2 (09 Dec 2019 04:12), Max: 37.4 (08 Dec 2019 21:04)  T(F): 98.9 (09 Dec 2019 04:12), Max: 99.3 (08 Dec 2019 21:04)  HR: 70 (09 Dec 2019 04:12) (70 - 97)  BP: 147/72 (09 Dec 2019 04:12) (144/74 - 156/62)  BP(mean): --  RR: 18 (09 Dec 2019 04:12) (18 - 18)  SpO2: 99% (09 Dec 2019 04:12) (93% - 99%)    CONSTITUTIONAL: NAD, resting comfortably  RESPIRATORY: Normal respiratory effort; lungs are clear to auscultation bilaterally; no wheezes/rales/rhonchi  CARDIOVASCULAR: Regular rate and rhythm, normal S1 and S2, no murmur/rub/gallop;  ABDOMEN: Nontender to palpation, normoactive bowel sounds, no rebound/guarding; No hepatosplenomegaly  EXTREMITIES: No edema; 2+ peripheral pulses; no clubbing or cyanosis of digits; no joint swelling or tenderness to palpation  NEURO/PSYCH: A+Ox3; affect appropriate; no focal neuro deficits appreciated    LABS:                        7.6    6.09  )-----------( 378      ( 08 Dec 2019 17:45 )             24.3     12-09    139  |  103  |  52<H>  ----------------------------<  128<H>  3.7   |  23  |  3.06<H>    Ca    7.9<L>      09 Dec 2019 07:01  Phos  2.2     12-09  Mg     1.4     12-09                  RADIOLOGY & ADDITIONAL TESTS:  Results Reviewed: Yes  Imaging Personally Reviewed: Yes  Electrocardiogram Personally Reviewed: Yes    COORDINATION OF CARE:  Care Discussed with Consultants/Other Providers [Y/N]: Y  Prior or Outpatient Records Reviewed [Y/N]: Y PROGRESS NOTE:   ************************************************  Authored by: Dionicio Harvey MD PGY1  Internal Medicine  Pager: [facility]: [phone]  *************************************************    Patient is a 74y old  Male who presents with a chief complaint of SOB (08 Dec 2019 13:23)      SUBJECTIVE / OVERNIGHT EVENTS: No acute events overnight. The patient was seen and examined at bedside. Patient states that he feels okay today; breathing has slightly improved. Does not like the feel of the nasal cannula and currently not wearing it, still not feeling SOB. Patient denies any issues this morning, specifically denies fever/chills, headache, chest pain, SOB, palpitations, abdominal pain, nausea/vomiting, dysuria, changes in urinary or bowel habits, joint pain/swelling, weakness. Patient would like to try to get out of bed today and move around, states PT has seen him and they are helping him with that.    REVIEW OF SYSTEMS:  As above, otherwise negative.    MEDICATIONS  (STANDING):  calcitriol   Capsule 0.25 MICROGram(s) Oral daily  cefTRIAXone   IVPB 1000 milliGRAM(s) IV Intermittent every 24 hours  ferrous    sulfate 325 milliGRAM(s) Oral daily  heparin  Injectable 5000 Unit(s) SubCutaneous every 12 hours  pantoprazole    Tablet 40 milliGRAM(s) Oral before breakfast    MEDICATIONS  (PRN):  acetaminophen   Tablet .. 650 milliGRAM(s) Oral every 6 hours PRN Temp greater or equal to 38C (100.4F)  HYDROmorphone  Injectable 0.25 milliGRAM(s) IV Push every 6 hours PRN Severe Pain (7 - 10)        I&O's Summary    08 Dec 2019 07:01  -  09 Dec 2019 07:00  --------------------------------------------------------  IN: 890 mL / OUT: 1900 mL / NET: -1010 mL        PHYSICAL EXAM:  Vital Signs Last 24 Hrs  T(C): 37.2 (09 Dec 2019 04:12), Max: 37.4 (08 Dec 2019 21:04)  T(F): 98.9 (09 Dec 2019 04:12), Max: 99.3 (08 Dec 2019 21:04)  HR: 70 (09 Dec 2019 04:12) (70 - 97)  BP: 147/72 (09 Dec 2019 04:12) (144/74 - 156/62)  BP(mean): --  RR: 18 (09 Dec 2019 04:12) (18 - 18)  SpO2: 99% (09 Dec 2019 04:12) (93% - 99%)    CONSTITUTIONAL: NAD, resting in bed, NC set to 4L, but pt not wearing at the moment  RESPIRATORY: Normal respiratory effort; slightly decreased breath sounds in RLL  CARDIOVASCULAR: Regular rate and rhythm, normal S1 and S2, no murmur/rub/gallop; No lower extremity edema; Peripheral pulses are 2+ bilaterally  ABDOMEN: Nontender to palpation, normoactive bowel sounds, no rebound/guarding; No hepatosplenomegaly  MUSCLOSKELETAL: no clubbing or cyanosis of digits; no joint swelling or tenderness to palpation  NEURO/PSYCH: A+O x3; affect appropriate, no focal neuro deficits appreciated    LABS:                        7.6    6.09  )-----------( 378      ( 08 Dec 2019 17:45 )             24.3     12-09    139  |  103  |  52<H>  ----------------------------<  128<H>  3.7   |  23  |  3.06<H>    Ca    7.9<L>      09 Dec 2019 07:01  Phos  2.2     12-09  Mg     1.4     12-09      RADIOLOGY & ADDITIONAL TESTS:  Results Reviewed: Yes  Imaging Personally Reviewed: Yes  Electrocardiogram Personally Reviewed: Yes    COORDINATION OF CARE:  Care Discussed with Consultants/Other Providers [Y/N]: Y  Prior or Outpatient Records Reviewed [Y/N]: Y

## 2019-12-10 ENCOUNTER — TRANSCRIPTION ENCOUNTER (OUTPATIENT)
Age: 74
End: 2019-12-10

## 2019-12-10 LAB
ANION GAP SERPL CALC-SCNC: 13 MMOL/L — SIGNIFICANT CHANGE UP (ref 5–17)
BUN SERPL-MCNC: 40 MG/DL — HIGH (ref 7–23)
CALCIUM SERPL-MCNC: 8.1 MG/DL — LOW (ref 8.4–10.5)
CHLORIDE SERPL-SCNC: 104 MMOL/L — SIGNIFICANT CHANGE UP (ref 96–108)
CO2 SERPL-SCNC: 26 MMOL/L — SIGNIFICANT CHANGE UP (ref 22–31)
CREAT SERPL-MCNC: 2.52 MG/DL — HIGH (ref 0.5–1.3)
GLUCOSE SERPL-MCNC: 116 MG/DL — HIGH (ref 70–99)
HCT VFR BLD CALC: 26.1 % — LOW (ref 39–50)
HGB BLD-MCNC: 8 G/DL — LOW (ref 13–17)
MCHC RBC-ENTMCNC: 27.2 PG — SIGNIFICANT CHANGE UP (ref 27–34)
MCHC RBC-ENTMCNC: 30.7 GM/DL — LOW (ref 32–36)
MCV RBC AUTO: 88.8 FL — SIGNIFICANT CHANGE UP (ref 80–100)
NRBC # BLD: 0 /100 WBCS — SIGNIFICANT CHANGE UP (ref 0–0)
PLATELET # BLD AUTO: 471 K/UL — HIGH (ref 150–400)
POTASSIUM SERPL-MCNC: 3.8 MMOL/L — SIGNIFICANT CHANGE UP (ref 3.5–5.3)
POTASSIUM SERPL-SCNC: 3.8 MMOL/L — SIGNIFICANT CHANGE UP (ref 3.5–5.3)
RBC # BLD: 2.94 M/UL — LOW (ref 4.2–5.8)
RBC # FLD: 13.6 % — SIGNIFICANT CHANGE UP (ref 10.3–14.5)
SODIUM SERPL-SCNC: 143 MMOL/L — SIGNIFICANT CHANGE UP (ref 135–145)
WBC # BLD: 6.35 K/UL — SIGNIFICANT CHANGE UP (ref 3.8–10.5)
WBC # FLD AUTO: 6.35 K/UL — SIGNIFICANT CHANGE UP (ref 3.8–10.5)

## 2019-12-10 PROCEDURE — 99233 SBSQ HOSP IP/OBS HIGH 50: CPT | Mod: GC

## 2019-12-10 RX ORDER — OXYCODONE HYDROCHLORIDE 5 MG/1
30 TABLET ORAL EVERY 8 HOURS
Refills: 0 | Status: DISCONTINUED | OUTPATIENT
Start: 2019-12-10 | End: 2019-12-11

## 2019-12-10 RX ADMIN — PANTOPRAZOLE SODIUM 40 MILLIGRAM(S): 20 TABLET, DELAYED RELEASE ORAL at 05:12

## 2019-12-10 RX ADMIN — HEPARIN SODIUM 5000 UNIT(S): 5000 INJECTION INTRAVENOUS; SUBCUTANEOUS at 17:13

## 2019-12-10 RX ADMIN — HEPARIN SODIUM 5000 UNIT(S): 5000 INJECTION INTRAVENOUS; SUBCUTANEOUS at 05:12

## 2019-12-10 RX ADMIN — Medication 325 MILLIGRAM(S): at 12:13

## 2019-12-10 RX ADMIN — CALCITRIOL 0.25 MICROGRAM(S): 0.5 CAPSULE ORAL at 12:13

## 2019-12-10 RX ADMIN — CEFTRIAXONE 100 MILLIGRAM(S): 500 INJECTION, POWDER, FOR SOLUTION INTRAMUSCULAR; INTRAVENOUS at 05:12

## 2019-12-10 NOTE — PROGRESS NOTE ADULT - PROBLEM SELECTOR PLAN 7
- with chronic pain and opioid dependence [facility] [phone]  - given mild encephalopathy and FLORENTIN, oxycodone changed to hydromorphone for less sedation  - consider changing back to oxycodone as patient's responsiveness improves and FLORENTIN resolves - with chronic pain and opioid dependence [facility] [phone]  - will restart patient's oxycodone given improvement in kidney function and mental status

## 2019-12-10 NOTE — DIETITIAN INITIAL EVALUATION ADULT. - PROBLEM SELECTOR PLAN 7
--with chronic pain and opioid dependence [facility] [phone]  --given mild encephalopathy, will reduce dose of oxycodone from 30mg to 10mg, hold for sedation.

## 2019-12-10 NOTE — PROGRESS NOTE ADULT - PROBLEM SELECTOR PLAN 8
DVT ppx: HSQ  Renal diet  Dispo: Uncertain at this time, pending PT eval DVT ppx: HSQ  Diet: Renal diet  Dispo: Uncertain at this time, pending PT eval DVT ppx: HSQ  Diet: Renal diet  Dispo: PT rec KATHY, pt refusing; likely d/c home tomorrow

## 2019-12-10 NOTE — DIETITIAN INITIAL EVALUATION ADULT. - PROBLEM SELECTOR PLAN 4
--may be due to sepsis vs uremia, likely multifactorial  --frequent reorientation  --treatement as above

## 2019-12-10 NOTE — CHART NOTE - NSCHARTNOTEFT_GEN_A_CORE
Nurse called due to difficulty deflating pt's Heredia. Heredia was easily deflated and removed with no issues. pt tolerated well.

## 2019-12-10 NOTE — PROGRESS NOTE ADULT - PROBLEM SELECTOR PLAN 1
- CXR and clinical symptoms suggestive of pneumonia  - legionella negative  - c/w ceftriaxone  - on O2, 4L NC, not on O2 at home; continue to wean off O2 as able - CXR and clinical symptoms suggestive of pneumonia  - legionella negative  - will switch to oral ceftin given improvement in status, needs to complete 7 day course total  - on O2, 4L NC, not on O2 at home; continue to wean off O2 as able - CXR and clinical symptoms suggestive of pneumonia  - legionella negative  - completes 7 day course ceftriaxone tomorrow morning  - on O2, 4L NC, not on O2 at home; continue to wean off O2 as able

## 2019-12-10 NOTE — PROGRESS NOTE ADULT - PROBLEM SELECTOR PLAN 5
Normocytic anemia; Hgb decreased from 12 (in 9/2019) to 9.  - patient denies hematuria, melena, hematochezia  - may be secondary to CKD  - will send iron studies, other anemia w/u, trend CBC  - Hgb 7.1 this morning, trend CBC regularly, stat CBC if decompensates Normocytic anemia; Hgb decreased from 12 (in 9/2019) to 9.  - patient denies hematuria, melena, hematochezia  - may be secondary to CKD  - Hgb 8.1 this morning, trend CBC regularly, stat CBC if decompensates

## 2019-12-10 NOTE — DIETITIAN INITIAL EVALUATION ADULT. - PROBLEM SELECTOR PLAN 8
Patient and wife unsure about medications. Will need to verify medications with pharmacy. Patient goes to [facility] on [address]. Unverified med rec done with pharmacy records from other sources.

## 2019-12-10 NOTE — DIETITIAN INITIAL EVALUATION ADULT. - ENERGY NEEDS
Ht: 68in, Current Wt: 163.8lbs (12/10/19), BMI: 24.9kg/m2, IBW: 154lbs +/- 10%, %IBW: 111%  Edema: none noted Skin per nursing flowsheets: no pressure injury noted.

## 2019-12-10 NOTE — PROGRESS NOTE ADULT - PROBLEM SELECTOR PLAN 3
Cr elevated to 7 from 1.4 in 10/2019. Has unknown level of CKD.  - renal US with no evidence hydronephrosis, urine lytes suggesting pre-renal  - Cr continuing to improve, now 3.06; off IVF  - Encephalopathy may be secondary to sepsis and uremia  - Renally dose antibiotics  - nephrology signed off yesterday; reconsult as needed  - hold home diuretic, ACE-I, re-add as tolerated Cr elevated to 7 from 1.4 in 10/2019. Has unknown level of CKD.  - renal US with no evidence hydronephrosis, urine lytes suggesting pre-renal  - Cr continuing to improve, now 2.5; off IVF  - Encephalopathy may be secondary to sepsis and uremia  - Renally dose antibiotics  - holding home diuretic, ACE-I, re-add as tolerated

## 2019-12-10 NOTE — DIETITIAN INITIAL EVALUATION ADULT. - PHYSICAL APPEARANCE
Nutrition focused physical exam conducted with verbal consent from patient. Pt observed with moderate muscle wasting (clavicle and calves) and moderate fat loss (triceps)/other (specify)

## 2019-12-10 NOTE — DIETITIAN INITIAL EVALUATION ADULT. - PERTINENT MEDS FT
MEDICATIONS  (STANDING):  calcitriol   Capsule 0.25 MICROGram(s) Oral daily  cefTRIAXone   IVPB 1000 milliGRAM(s) IV Intermittent every 24 hours  ferrous    sulfate 325 milliGRAM(s) Oral daily  heparin  Injectable 5000 Unit(s) SubCutaneous every 12 hours  pantoprazole    Tablet 40 milliGRAM(s) Oral before breakfast    MEDICATIONS  (PRN):  acetaminophen   Tablet .. 650 milliGRAM(s) Oral every 6 hours PRN Temp greater or equal to 38C (100.4F)  oxyCODONE    IR 30 milliGRAM(s) Oral every 8 hours PRN Severe Pain (7 - 10)

## 2019-12-10 NOTE — PROGRESS NOTE ADULT - PROBLEM SELECTOR PLAN 4
- may be due to sepsis vs uremia, likely multifactorial  - frequent reorientation  - treatement of underlying PNA/sepsis and FLORENTIN as above  - oxycodone for pain changed to hydromorphone due to concern of sedation from possible reduced oxy clearance in setting of FLORENTIN - may be due to sepsis vs uremia, likely multifactorial  - resolving, re-orientation as needed  - treatement of underlying PNA/sepsis and FLORENTIN as above  - oxycodone for pain changed to hydromorphone due to concern of sedation from possible reduced oxy clearance in setting of FLORENTIN

## 2019-12-10 NOTE — DISCHARGE NOTE PROVIDER - NSDCMRMEDTOKEN_GEN_ALL_CORE_FT
amLODIPine 10 mg oral tablet: 1 tab(s) orally once a day  calcitriol 0.25 mcg oral capsule: 1 cap(s) orally once a day  carvedilol 3.125 mg oral tablet: 1 tab(s) orally 2 times a day  Lasix 40 mg oral tablet: 1 tab(s) orally once a day  lisinopril 10 mg oral tablet: 1 tab(s) orally once a day  NIFEdipine 90 mg oral tablet, extended release: 1 tab(s) orally once a day  oxyCODONE 30 mg oral tablet: 1 tab(s) orally every 6 hours, As Needed ISTOP [id number]  pantoprazole 40 mg oral delayed release tablet: 1 tab(s) orally once a day  Tylenol Extra Strength 500 mg oral tablet: 2 tab(s) orally every 8 hours amLODIPine 10 mg oral tablet: 1 tab(s) orally once a day  calcitriol 0.25 mcg oral capsule: 1 cap(s) orally once a day  carvedilol 3.125 mg oral tablet: 1 tab(s) orally 2 times a day  Lasix 40 mg oral tablet: 1 tab(s) orally once a day  NIFEdipine 90 mg oral tablet, extended release: 1 tab(s) orally once a day  oxyCODONE 30 mg oral tablet: 1 tab(s) orally every 6 hours, As Needed ISTOP [id number]  pantoprazole 40 mg oral delayed release tablet: 1 tab(s) orally once a day  Tylenol Extra Strength 500 mg oral tablet: 2 tab(s) orally every 8 hours amLODIPine 10 mg oral tablet: 1 tab(s) orally once a day  calcitriol 0.25 mcg oral capsule: 1 cap(s) orally once a day  carvedilol 3.125 mg oral tablet: 1 tab(s) orally 2 times a day  Lasix 40 mg oral tablet: 1 tab(s) orally once a day  NIFEdipine 90 mg oral tablet, extended release: 1 tab(s) orally once a day  oxyCODONE 30 mg oral tablet: 1 tab(s) orally every 6 hours, As Needed ISTOP [id number]  pantoprazole 40 mg oral delayed release tablet: 1 tab(s) orally once a day  Tylenol Extra Strength 500 mg oral tablet: 2 tab(s) orally every 8 hours

## 2019-12-10 NOTE — DIETITIAN INITIAL EVALUATION ADULT. - PROBLEM SELECTOR PLAN 3
Cr elevated to 7 from 1.4 in 10/2019. Has unknown level of CKD.  - F/u renal US  - Encephalopathy may be secondary to sepsis and uremia  - s/p 2 L IVF.   - Renally dose antibiotics  - nephrology consult in the AM.  --trend BmP and assess response to IVF.  --hold home diuretic, ACE-I

## 2019-12-10 NOTE — DIETITIAN INITIAL EVALUATION ADULT. - ADD RECOMMEND
1) Recommend continue current diet as tolerated and medically appropriate. 2) Recommend Nepro 1x daily (425cal, 19g protein/serving) to promote adequate PO intake. 3) Continue to encourage po intake and obtain/honor food preferences as able. 4) Monitor PO intake, diet tolerance, weight trends, labs, and skin integrity. 1) Recommend continue current diet as tolerated and medically appropriate. 2) Recommend Nepro 1x daily (425cal, 19g protein/serving) to promote adequate PO intake - spoke to Team 3. 3) Continue to encourage po intake and obtain/honor food preferences as able. 4) Monitor PO intake, diet tolerance, weight trends, labs, and skin integrity. 5) Malnutrition sticker placed; spoke to team 3.

## 2019-12-10 NOTE — DIETITIAN INITIAL EVALUATION ADULT. - PERTINENT LABORATORY DATA
12/10/19 - BUN 40(H), Creatinine 2.52(H), glucose 16(H), calcium 8.1(L), eGFR 28(L), Potassium 3.8(WDL)  12/9/19 - Phos 2.2(L)

## 2019-12-10 NOTE — DISCHARGE NOTE PROVIDER - CARE PROVIDER_API CALL
Frederic Pineda)  Medicine  [id number] [address] [place] [address]  Phone: [phone]  Fax: [phone]  Established Patient  Follow Up Time: 2 weeks Frederic Pineda)  Medicine  [address] [address] [place] [address]  Phone: [phone]  Fax: [phone]  Established Patient  Follow Up Time: 1 week

## 2019-12-10 NOTE — DIETITIAN INITIAL EVALUATION ADULT. - PROBLEM SELECTOR PLAN 2
Severe Sepsis due to PNA, with associated metabolic encephalopathy and FLORENTIN  - Will c/w ctx and azithromycin  - F/u blood and urine cultures  -received IVF resuscitation

## 2019-12-10 NOTE — DIETITIAN INITIAL EVALUATION ADULT. - PROBLEM SELECTOR PLAN 5
Normocytic anemia; Hgb decreased from 12 (in 9/2019) to 9.  --patient denies hematuria, melena, hematochezia  --may be secondary to CKD  --will send iron studies, other anemia w/u, trend CBC

## 2019-12-10 NOTE — DIETITIAN INITIAL EVALUATION ADULT. - OTHER INFO
Visited pt at bedside with family (wife and daughter) present. Pt reports having a poor appetite recently. Pt states that decreased appetite began about 1 week PTA. Pt unsure of why he was losing his appetite. States that he simply ate when he felt like it. Pt reports NKFA or intolerances. Pt denies any current chewing/swallowing difficulty, self-feeding difficulty, nausea/vomiting, constipation, or diarrhea. Pt denies any nutritional or micronutrient supplementation at home.     Patient's poor appetite continues in-house. Pt states that he just does note feel like eating sometimes. Pt endorses that family tries to bring him food from home when they can. Pt amenable to Nepro (425cal, 19g protein/serving) in-house to promote adequate energy intake.     Pt reports a UBW of 155lbs but is unsure of last time he was weighted. Pt made aware that his current weight in-house is 163.8lbs (12/10).

## 2019-12-10 NOTE — PROGRESS NOTE ADULT - PROBLEM SELECTOR PLAN 2
Severe Sepsis due to PNA, with associated metabolic encephalopathy and FLORENTIN  - c/w ctx as above  - F/u blood and urine cultures. Bcx 12/4 w/ 1 bottle growing coag neg staph, likely contaminant  - s/p IVF but with B lines noted on pocus on 12/6 so currently being held  - tylenol prn for fever Severe Sepsis due to PNA, with associated metabolic encephalopathy and FLORENTIN  - WBC down, sepsis appears resolved  - c/w abx as above  - tylenol prn for fever

## 2019-12-10 NOTE — CHART NOTE - NSCHARTNOTEFT_GEN_A_CORE
Upon Nutritional Assessment by the Registered Dietitian your patient was determined to meet criteria / has evidence of the following diagnosis/diagnoses:          [ ]  Mild Protein Calorie Malnutrition        [ ]  Moderate Protein Calorie Malnutrition        [x] Severe Protein Calorie Malnutrition        [ ] Unspecified Protein Calorie Malnutrition        [ ] Underweight / BMI <19        [ ] Morbid Obesity / BMI > 40      Findings as based on:  [x] Comprehensive nutrition assessment   [x] Nutrition Focused Physical Exam  [x] Other: PO intake <75% for >7 days, moderate fat and muscle loss (triceps, clavicle, calves).      Nutrition Plan/Recommendations:    1) Recommend continue current diet as tolerated and medically appropriate.   2) Recommend Nepro 1x daily (425cal, 19g protein/serving) to promote adequate PO intake.  3) Continue to encourage po intake and obtain/honor food preferences as able.   4) Monitor PO intake, diet tolerance, weight trends, labs, and skin integrity.      PROVIDER Section:     By signing this assessment you are acknowledging and agree with the diagnosis/diagnoses assigned by the Registered Dietitian    Comments:

## 2019-12-10 NOTE — DISCHARGE NOTE PROVIDER - NSDCCPCAREPLAN_GEN_ALL_CORE_FT
PRINCIPAL DISCHARGE DIAGNOSIS  Diagnosis: Sepsis  Assessment and Plan of Treatment: You were treated for severe infection in the hospital, thought to be caused by pneumonia (infection in the lungs), requiring close monitoring and oxygen support. You received antibiotics for the infection as below, and were treated supportively with fluids. We decreased the amount of oxygen you were receiving as comfortably tolerated until you did not need further oxygen support. You should be seen by your primary care provider or come to the ED if you have returning fevers< increased SOB or if your blood pressure is found to be low      SECONDARY DISCHARGE DIAGNOSES  Diagnosis: Pneumonia  Assessment and Plan of Treatment: You had a localized infection in the lungs that was causing your shortness of breath and were treated with a week of antibiotics. You should see you primary care physician or return to the ED if you have returning fevers or shortness of breath or if you develop new cough that isn't relieved with over the counter cold medicines.    Diagnosis: FLORENTIN (acute kidney injury)  Assessment and Plan of Treatment: PRINCIPAL DISCHARGE DIAGNOSIS  Diagnosis: Sepsis  Assessment and Plan of Treatment: You were treated for severe infection in the hospital, thought to be caused by pneumonia (infection in the lungs), requiring close monitoring and oxygen support. You received antibiotics for the infection as below, and were treated supportively with fluids. We decreased the amount of oxygen you were receiving as comfortably tolerated until you did not need further oxygen support. You should be seen by your primary care provider or come to the ED if you have returning fevers< increased SOB or if your blood pressure is found to be low      SECONDARY DISCHARGE DIAGNOSES  Diagnosis: Pneumonia  Assessment and Plan of Treatment: You had a localized infection in the lungs that was causing your shortness of breath and were treated with a week of antibiotics. You should see you primary care physician or return to the ED if you have returning fevers or shortness of breath or if you develop new cough that isn't relieved with over the counter cold medicines.    Diagnosis: FLORENTIN (acute kidney injury)  Assessment and Plan of Treatment: You came in with evidence of poor kidney function; you were supported with gentle fluid hydration and your kidney function improved. You should follow up with your primary care doctor in about a week to re-evalute your kidney function and ensure that it is continuing to improve after discharge from the hospital. Please see your primary care doctor or return to the ED if your urinary habits acutely change, especially if your bladder feels full and you are unable to urinate or if you notice blood in your urine.    Diagnosis: Hypotension  Assessment and Plan of Treatment: When you came into the hospital, your blood pressure was low and your blood pressure medications were not given to you to prevent further lowering of your blood pressure. Now that you have improved clinically, you should continue the blood pressure medicines that you were taking previously and follow up with your primary care doctor for continued management of your blood pressure, especially if you start feeling dizzy or fatigued, which are signs of a possible low blood pressure, or have headaches, chest pain, or feel your heart racing, as these can be signs of a hypertensive crisis and require urgent evaluation.

## 2019-12-10 NOTE — DISCHARGE NOTE PROVIDER - PROVIDER TOKENS
PROVIDER:[TOKEN:[[id number]],FOLLOWUP:[2 weeks],ESTABLISHEDPATIENT:[T]] PROVIDER:[TOKEN:[[id number]],FOLLOWUP:[1 week],ESTABLISHEDPATIENT:[T]]

## 2019-12-11 VITALS
DIASTOLIC BLOOD PRESSURE: 85 MMHG | HEART RATE: 81 BPM | RESPIRATION RATE: 17 BRPM | OXYGEN SATURATION: 94 % | TEMPERATURE: 98 F | SYSTOLIC BLOOD PRESSURE: 153 MMHG

## 2019-12-11 LAB — GLUCOSE BLDC GLUCOMTR-MCNC: 107 MG/DL — HIGH (ref 70–99)

## 2019-12-11 PROCEDURE — 85730 THROMBOPLASTIN TIME PARTIAL: CPT

## 2019-12-11 PROCEDURE — 82728 ASSAY OF FERRITIN: CPT

## 2019-12-11 PROCEDURE — 99285 EMERGENCY DEPT VISIT HI MDM: CPT | Mod: 25

## 2019-12-11 PROCEDURE — 99239 HOSP IP/OBS DSCHRG MGMT >30: CPT

## 2019-12-11 PROCEDURE — 85610 PROTHROMBIN TIME: CPT

## 2019-12-11 PROCEDURE — 82947 ASSAY GLUCOSE BLOOD QUANT: CPT

## 2019-12-11 PROCEDURE — 84156 ASSAY OF PROTEIN URINE: CPT

## 2019-12-11 PROCEDURE — 96374 THER/PROPH/DIAG INJ IV PUSH: CPT

## 2019-12-11 PROCEDURE — 87389 HIV-1 AG W/HIV-1&-2 AB AG IA: CPT

## 2019-12-11 PROCEDURE — 82330 ASSAY OF CALCIUM: CPT

## 2019-12-11 PROCEDURE — 87633 RESP VIRUS 12-25 TARGETS: CPT

## 2019-12-11 PROCEDURE — 83935 ASSAY OF URINE OSMOLALITY: CPT

## 2019-12-11 PROCEDURE — 83540 ASSAY OF IRON: CPT

## 2019-12-11 PROCEDURE — 87798 DETECT AGENT NOS DNA AMP: CPT

## 2019-12-11 PROCEDURE — 84560 ASSAY OF URINE/URIC ACID: CPT

## 2019-12-11 PROCEDURE — 85014 HEMATOCRIT: CPT

## 2019-12-11 PROCEDURE — 94640 AIRWAY INHALATION TREATMENT: CPT

## 2019-12-11 PROCEDURE — 97110 THERAPEUTIC EXERCISES: CPT

## 2019-12-11 PROCEDURE — 93005 ELECTROCARDIOGRAM TRACING: CPT

## 2019-12-11 PROCEDURE — 87581 M.PNEUMON DNA AMP PROBE: CPT

## 2019-12-11 PROCEDURE — 81001 URINALYSIS AUTO W/SCOPE: CPT

## 2019-12-11 PROCEDURE — 82570 ASSAY OF URINE CREATININE: CPT

## 2019-12-11 PROCEDURE — 97116 GAIT TRAINING THERAPY: CPT

## 2019-12-11 PROCEDURE — 83735 ASSAY OF MAGNESIUM: CPT

## 2019-12-11 PROCEDURE — 85045 AUTOMATED RETICULOCYTE COUNT: CPT

## 2019-12-11 PROCEDURE — 82962 GLUCOSE BLOOD TEST: CPT

## 2019-12-11 PROCEDURE — 84100 ASSAY OF PHOSPHORUS: CPT

## 2019-12-11 PROCEDURE — 80053 COMPREHEN METABOLIC PANEL: CPT

## 2019-12-11 PROCEDURE — 76770 US EXAM ABDO BACK WALL COMP: CPT

## 2019-12-11 PROCEDURE — 87486 CHLMYD PNEUM DNA AMP PROBE: CPT

## 2019-12-11 PROCEDURE — 87040 BLOOD CULTURE FOR BACTERIA: CPT

## 2019-12-11 PROCEDURE — 87150 DNA/RNA AMPLIFIED PROBE: CPT

## 2019-12-11 PROCEDURE — 86900 BLOOD TYPING SEROLOGIC ABO: CPT

## 2019-12-11 PROCEDURE — 84540 ASSAY OF URINE/UREA-N: CPT

## 2019-12-11 PROCEDURE — 83550 IRON BINDING TEST: CPT

## 2019-12-11 PROCEDURE — 86803 HEPATITIS C AB TEST: CPT

## 2019-12-11 PROCEDURE — 71045 X-RAY EXAM CHEST 1 VIEW: CPT

## 2019-12-11 PROCEDURE — 86901 BLOOD TYPING SEROLOGIC RH(D): CPT

## 2019-12-11 PROCEDURE — 82435 ASSAY OF BLOOD CHLORIDE: CPT

## 2019-12-11 PROCEDURE — 97162 PT EVAL MOD COMPLEX 30 MIN: CPT

## 2019-12-11 PROCEDURE — 87449 NOS EACH ORGANISM AG IA: CPT

## 2019-12-11 PROCEDURE — 82607 VITAMIN B-12: CPT

## 2019-12-11 PROCEDURE — 87086 URINE CULTURE/COLONY COUNT: CPT

## 2019-12-11 PROCEDURE — 84132 ASSAY OF SERUM POTASSIUM: CPT

## 2019-12-11 PROCEDURE — 86850 RBC ANTIBODY SCREEN: CPT

## 2019-12-11 PROCEDURE — 82803 BLOOD GASES ANY COMBINATION: CPT

## 2019-12-11 PROCEDURE — 80048 BASIC METABOLIC PNL TOTAL CA: CPT

## 2019-12-11 PROCEDURE — 85027 COMPLETE CBC AUTOMATED: CPT

## 2019-12-11 PROCEDURE — 84300 ASSAY OF URINE SODIUM: CPT

## 2019-12-11 PROCEDURE — 84295 ASSAY OF SERUM SODIUM: CPT

## 2019-12-11 PROCEDURE — 83605 ASSAY OF LACTIC ACID: CPT

## 2019-12-11 PROCEDURE — 82746 ASSAY OF FOLIC ACID SERUM: CPT

## 2019-12-11 RX ORDER — LISINOPRIL 2.5 MG/1
1 TABLET ORAL
Qty: 0 | Refills: 0 | DISCHARGE

## 2019-12-11 RX ADMIN — PANTOPRAZOLE SODIUM 40 MILLIGRAM(S): 20 TABLET, DELAYED RELEASE ORAL at 06:09

## 2019-12-11 RX ADMIN — CEFTRIAXONE 100 MILLIGRAM(S): 500 INJECTION, POWDER, FOR SOLUTION INTRAMUSCULAR; INTRAVENOUS at 05:29

## 2019-12-11 RX ADMIN — HEPARIN SODIUM 5000 UNIT(S): 5000 INJECTION INTRAVENOUS; SUBCUTANEOUS at 05:43

## 2019-12-11 NOTE — PROGRESS NOTE ADULT - PROBLEM SELECTOR PLAN 7
- with chronic pain and opioid dependence ISTOP [id number]  - will restart patient's oxycodone given improvement in kidney function and mental status - [id number]  - pt's home oxycodone restarted

## 2019-12-11 NOTE — PROGRESS NOTE ADULT - PROBLEM SELECTOR PROBLEM 3
Hyponatremia
Acute renal failure

## 2019-12-11 NOTE — PROGRESS NOTE ADULT - SUBJECTIVE AND OBJECTIVE BOX
PROGRESS NOTE:   ************************************************  Authored by: Dionicio Harvey MD PGY1  Internal Medicine  Pager: [facility]: [phone]  *************************************************    Patient is a 74y old  Male who presents with a chief complaint of SOB (10 Dec 2019 16:16)      SUBJECTIVE / OVERNIGHT EVENTS: The patient was seen and examined at bedside.     REVIEW OF SYSTEMS:  As above, otherwise negative.    MEDICATIONS  (STANDING):  calcitriol   Capsule 0.25 MICROGram(s) Oral daily  ferrous    sulfate 325 milliGRAM(s) Oral daily  heparin  Injectable 5000 Unit(s) SubCutaneous every 12 hours  pantoprazole    Tablet 40 milliGRAM(s) Oral before breakfast    MEDICATIONS  (PRN):  acetaminophen   Tablet .. 650 milliGRAM(s) Oral every 6 hours PRN Temp greater or equal to 38C (100.4F)  oxyCODONE    IR 30 milliGRAM(s) Oral every 8 hours PRN Severe Pain (7 - 10)        I&O's Summary    10 Dec 2019 07:01  -  11 Dec 2019 07:00  --------------------------------------------------------  IN: 390 mL / OUT: 700 mL / NET: -310 mL        PHYSICAL EXAM:  Vital Signs Last 24 Hrs  T(C): 36.9 (11 Dec 2019 04:46), Max: 36.9 (10 Dec 2019 21:00)  T(F): 98.4 (11 Dec 2019 04:46), Max: 98.4 (10 Dec 2019 21:00)  HR: 81 (11 Dec 2019 04:46) (75 - 88)  BP: 153/85 (11 Dec 2019 04:46) (117/70 - 153/85)  BP(mean): --  RR: 17 (11 Dec 2019 04:46) (17 - 18)  SpO2: 94% (11 Dec 2019 04:46) (94% - 99%)    CONSTITUTIONAL: NAD, resting comfortably  RESPIRATORY: Normal respiratory effort; lungs are clear to auscultation bilaterally; no wheezes/rales/rhonchi  CARDIOVASCULAR: Regular rate and rhythm, normal S1 and S2, no murmur/rub/gallop;  ABDOMEN: Nontender to palpation, normoactive bowel sounds, no rebound/guarding; No hepatosplenomegaly  EXTREMITIES: No edema; 2+ peripheral pulses; no clubbing or cyanosis of digits; no joint swelling or tenderness to palpation  NEURO/PSYCH: A+Ox3; affect appropriate; no focal neuro deficits appreciated    LABS:                        8.0    6.35  )-----------( 471      ( 10 Dec 2019 09:58 )             26.1     12-10    143  |  104  |  40<H>  ----------------------------<  116<H>  3.8   |  26  |  2.52<H>    Ca    8.1<L>      10 Dec 2019 09:58      RADIOLOGY & ADDITIONAL TESTS:  Results Reviewed: Yes  Imaging Personally Reviewed: Yes  Electrocardiogram Personally Reviewed: Yes    COORDINATION OF CARE:  Care Discussed with Consultants/Other Providers [Y/N]: Y  Prior or Outpatient Records Reviewed [Y/N]: Y PROGRESS NOTE:   ************************************************  Authored by: Dionicio Harvey MD PGY1  Internal Medicine  Pager: [facility]: [phone]  *************************************************    Patient is a 74y old  Male who presents with a chief complaint of SOB (10 Dec 2019 16:16)      SUBJECTIVE / OVERNIGHT EVENTS: No acute events overnight. The patient was seen and examined at bedside, no longer getting O2 by nasal cannula. Patient feels well and states would like to go home. Patient denies any acute complaints; he specifically denies fever/chills, headache, chest pain, SOB, palpitations, abdominal pain, nausea/vomiting, dysuria, changes in urinary or bowel habits, joint pain/swelling, weakness. Reiterates that he does not want to go to rehab and feels that once he starts moving around at home he will do better without the need for additional services.    REVIEW OF SYSTEMS:  As above, otherwise negative.    MEDICATIONS  (STANDING):  calcitriol   Capsule 0.25 MICROGram(s) Oral daily  ferrous    sulfate 325 milliGRAM(s) Oral daily  heparin  Injectable 5000 Unit(s) SubCutaneous every 12 hours  pantoprazole    Tablet 40 milliGRAM(s) Oral before breakfast    MEDICATIONS  (PRN):  acetaminophen   Tablet .. 650 milliGRAM(s) Oral every 6 hours PRN Temp greater or equal to 38C (100.4F)  oxyCODONE    IR 30 milliGRAM(s) Oral every 8 hours PRN Severe Pain (7 - 10)        I&O's Summary    10 Dec 2019 07:01  -  11 Dec 2019 07:00  --------------------------------------------------------  IN: 390 mL / OUT: 700 mL / NET: -310 mL        PHYSICAL EXAM:  Vital Signs Last 24 Hrs  T(C): 36.9 (11 Dec 2019 04:46), Max: 36.9 (10 Dec 2019 21:00)  T(F): 98.4 (11 Dec 2019 04:46), Max: 98.4 (10 Dec 2019 21:00)  HR: 81 (11 Dec 2019 04:46) (75 - 88)  BP: 153/85 (11 Dec 2019 04:46) (117/70 - 153/85)  BP(mean): --  RR: 17 (11 Dec 2019 04:46) (17 - 18)  SpO2: 94% (11 Dec 2019 04:46) (94% - 99%)    CONSTITUTIONAL: NAD, resting in bed, not using NC  RESPIRATORY: Normal respiratory effort; clear to auscultation bilaterally with no wheezes, rhonchi, rales  CARDIOVASCULAR: Regular rate and rhythm, normal S1 and S2, no murmur/rub/gallop; No lower extremity edema; Peripheral pulses are 2+ bilaterally  ABDOMEN: Nontender to palpation, normoactive bowel sounds, no rebound/guarding; No hepatosplenomegaly  MUSCLOSKELETAL: no clubbing or cyanosis of digits; no joint swelling or tenderness to palpation  NEURO/PSYCH: A+O x3; affect appropriate, no focal neuro deficits appreciated      LABS:                        8.0    6.35  )-----------( 471      ( 10 Dec 2019 09:58 )             26.1     12-10    143  |  104  |  40<H>  ----------------------------<  116<H>  3.8   |  26  |  2.52<H>    Ca    8.1<L>      10 Dec 2019 09:58      RADIOLOGY & ADDITIONAL TESTS:  Results Reviewed: Yes  Imaging Personally Reviewed: Yes  Electrocardiogram Personally Reviewed: Yes    COORDINATION OF CARE:  Care Discussed with Consultants/Other Providers [Y/N]: Y  Prior or Outpatient Records Reviewed [Y/N]: Y

## 2019-12-11 NOTE — PROGRESS NOTE ADULT - PROBLEM SELECTOR PLAN 1
- CXR and clinical symptoms suggestive of pneumonia  - legionella negative  - completes 7 day course ceftriaxone tomorrow morning  - on O2, 4L NC, not on O2 at home; continue to wean off O2 as able - CXR and clinical symptoms suggestive of pneumonia  - legionella negative  - s/p 7 day course of ceftriaxone, completed this morning  - off O2, saturating well

## 2019-12-11 NOTE — PROGRESS NOTE ADULT - PROBLEM SELECTOR PLAN 2
Severe Sepsis due to PNA, with associated metabolic encephalopathy and FLORENTIN  - WBC down, sepsis appears resolved  - c/w abx as above  - tylenol prn for fever Severe Sepsis due to PNA, with associated metabolic encephalopathy and FLORENTIN  - WBC down, sepsis appears resolved  - s/p antibiotics for pneumonia as above  - tylenol prn for fever

## 2019-12-11 NOTE — PROGRESS NOTE ADULT - PROBLEM SELECTOR PLAN 8
DVT ppx: HSQ  Diet: Renal diet  Dispo: PT rec KATHY, pt refusing; likely d/c home tomorrow DVT ppx: HSQ  Diet: Renal diet  Dispo: PT rec KATHY, pt refusing; likely d/c home today

## 2019-12-11 NOTE — PROGRESS NOTE ADULT - PROBLEM SELECTOR PLAN 5
Normocytic anemia; Hgb decreased from 12 (in 9/2019) to 9.  - patient denies hematuria, melena, hematochezia  - may be secondary to CKD  - Hgb 8.1 this morning, trend CBC regularly, stat CBC if decompensates Normocytic anemia; Hgb decreased from 12 (in 9/2019) to 9.  - patient denies hematuria, melena, hematochezia  - may be secondary to CKD  - H+H stable at ~8.1, continue to monitor while in hospital

## 2019-12-11 NOTE — PROGRESS NOTE ADULT - PROBLEM SELECTOR PLAN 4
- may be due to sepsis vs uremia, likely multifactorial  - resolving, re-orientation as needed  - treatement of underlying PNA/sepsis and FLORENTIN as above  - oxycodone for pain changed to hydromorphone due to concern of sedation from possible reduced oxy clearance in setting of FLORENTIN - may be due to sepsis vs uremia, likely multifactorial  - appears resolved s/p treatment of underlying PNA/sepsis and FLORENTIN as above  - restarted home oxycodone with no sedative effects noticed

## 2019-12-11 NOTE — PROGRESS NOTE ADULT - ATTENDING COMMENTS
I have seen this patient with the fellow and agree with their assessment and plan. In addition, FLORENTIN slowly improving as sepsis improves    Brayden Jones MD  Cell   Pager   Office 
I have seen this patient with the fellow and agree with their assessment and plan.     Brayden Jones MD  Cell   Pager   Office 
Patient seen and examined.  CAP with severe sepsis (metabolic encephalopathy and FLORENTIN).  Continue Ceftriaxone and azithromycin, IVF for possible prerenal azotemia.
Patient seen and examined.  CAP with severe sepsis (metabolic encephalopathy and FLORENTIN).  Exam with RLL rhales and egophony, slight distension on abd exam but with normal BS, patient passed stool twice yesterday.  Continue Ceftriaxone.  Renal function slightly improved, continue hydration and monitor lytes.
Patient seen and examined, case d/w Dr. Harvey.  CAP with severe sepsis (FLORENTIN and metabolic encephalopathy).  Clinically resolving, wean off of O2, change abx to po Ceftin to complete a 7 day course,  ambulate, await PT dispo recs.
Patient seen and examined, discussed with resident. labs and vitals are reviewed. Agree with above unless noted below.      CAP with severe sepsis (metabolic encephalopathy and FLORENTIN- continue Abx .    Renal function improving and cr is 5.35  Anemia- no evidence of bleeding Hg is stable and 8.0
Agree with above note by R1 Dr. Harvey incl findings and plan, edited where appropriate  monitor renal recovery  c/w tx for pna  clinically stable  Attending Physician Dr. Paul Gutiérrez [phone]
Patient seen and examined.  PNA - abx course completed today , FLORENTIN resolving, d/c luis armando.  Patient medical stable for d/c, with home PT.  Total d/c time 45 minutes.
Patient seen and examined.  PNA - change abx to po , FLORENTIN resolving, d/c durán.  Patient medical stable for d/c, KATHY vs home with home PT.  Total d/c time 45 minutes.

## 2019-12-11 NOTE — PROGRESS NOTE ADULT - PROBLEM SELECTOR PLAN 3
Cr elevated to 7 from 1.4 in 10/2019. Has unknown level of CKD.  - renal US with no evidence hydronephrosis, urine lytes suggesting pre-renal  - Cr continuing to improve, now 2.5; off IVF  - Encephalopathy may be secondary to sepsis and uremia  - Renally dose antibiotics  - holding home diuretic, ACE-I, re-add as tolerated Cr elevated to 7 from 1.4 in 10/2019. Has unknown level of CKD.  - renal US with no evidence hydronephrosis, urine lytes suggesting pre-renal  - Cr continually improving since admission; currently off IVF  - Renally dose antibiotics given CKD  - can restart home BP medications

## 2019-12-11 NOTE — PROGRESS NOTE ADULT - PROBLEM SELECTOR PROBLEM 2
Anemia
Severe sepsis

## 2020-08-25 NOTE — ED PROVIDER NOTE - RESPIRATORY NEGATIVE STATEMENT, MLM
no chest pain, no cough, and no shortness of breath. Closure 3 Information: This tab is for additional flaps and grafts above and beyond our usual structured repairs.  Please note if you enter information here it will not currently bill and you will need to add the billing information manually.

## 2021-09-02 ENCOUNTER — EMERGENCY (EMERGENCY)
Facility: HOSPITAL | Age: 76
LOS: 1 days | Discharge: HOME | End: 2021-09-02
Attending: EMERGENCY MEDICINE | Admitting: EMERGENCY MEDICINE
Payer: MEDICARE

## 2021-09-02 VITALS
DIASTOLIC BLOOD PRESSURE: 65 MMHG | SYSTOLIC BLOOD PRESSURE: 125 MMHG | HEART RATE: 72 BPM | RESPIRATION RATE: 20 BRPM | TEMPERATURE: 97 F | OXYGEN SATURATION: 100 %

## 2021-09-02 DIAGNOSIS — Z98.890 OTHER SPECIFIED POSTPROCEDURAL STATES: Chronic | ICD-10-CM

## 2021-09-02 LAB
ALBUMIN SERPL ELPH-MCNC: 4.5 G/DL — SIGNIFICANT CHANGE UP (ref 3.3–5)
ALP SERPL-CCNC: 67 U/L — SIGNIFICANT CHANGE UP (ref 40–120)
ALT FLD-CCNC: 18 U/L — SIGNIFICANT CHANGE UP (ref 4–41)
ANION GAP SERPL CALC-SCNC: 14 MMOL/L — SIGNIFICANT CHANGE UP (ref 7–14)
APTT BLD: 29.5 SEC — SIGNIFICANT CHANGE UP (ref 27–36.3)
AST SERPL-CCNC: 28 U/L — SIGNIFICANT CHANGE UP (ref 4–40)
BASOPHILS # BLD AUTO: 0.05 K/UL — SIGNIFICANT CHANGE UP (ref 0–0.2)
BASOPHILS NFR BLD AUTO: 0.8 % — SIGNIFICANT CHANGE UP (ref 0–2)
BILIRUB SERPL-MCNC: 0.3 MG/DL — SIGNIFICANT CHANGE UP (ref 0.2–1.2)
BLD GP AB SCN SERPL QL: NEGATIVE — SIGNIFICANT CHANGE UP
BUN SERPL-MCNC: 27 MG/DL — HIGH (ref 7–23)
CALCIUM SERPL-MCNC: 8.5 MG/DL — SIGNIFICANT CHANGE UP (ref 8.4–10.5)
CHLORIDE SERPL-SCNC: 103 MMOL/L — SIGNIFICANT CHANGE UP (ref 98–107)
CO2 SERPL-SCNC: 23 MMOL/L — SIGNIFICANT CHANGE UP (ref 22–31)
CREAT SERPL-MCNC: 2.18 MG/DL — HIGH (ref 0.5–1.3)
EOSINOPHIL # BLD AUTO: 0.12 K/UL — SIGNIFICANT CHANGE UP (ref 0–0.5)
EOSINOPHIL NFR BLD AUTO: 1.9 % — SIGNIFICANT CHANGE UP (ref 0–6)
GLUCOSE SERPL-MCNC: 100 MG/DL — HIGH (ref 70–99)
HCT VFR BLD CALC: 25.4 % — LOW (ref 39–50)
HGB BLD-MCNC: 6.6 G/DL — CRITICAL LOW (ref 13–17)
IANC: 2.41 K/UL — SIGNIFICANT CHANGE UP (ref 1.5–8.5)
IMM GRANULOCYTES NFR BLD AUTO: 0.2 % — SIGNIFICANT CHANGE UP (ref 0–1.5)
INR BLD: 1.03 RATIO — SIGNIFICANT CHANGE UP (ref 0.88–1.16)
LYMPHOCYTES # BLD AUTO: 2.74 K/UL — SIGNIFICANT CHANGE UP (ref 1–3.3)
LYMPHOCYTES # BLD AUTO: 44.1 % — HIGH (ref 13–44)
MAGNESIUM SERPL-MCNC: 1.6 MG/DL — SIGNIFICANT CHANGE UP (ref 1.6–2.6)
MCHC RBC-ENTMCNC: 16.8 PG — LOW (ref 27–34)
MCHC RBC-ENTMCNC: 26 GM/DL — LOW (ref 32–36)
MCV RBC AUTO: 64.8 FL — LOW (ref 80–100)
MONOCYTES # BLD AUTO: 0.89 K/UL — SIGNIFICANT CHANGE UP (ref 0–0.9)
MONOCYTES NFR BLD AUTO: 14.3 % — HIGH (ref 2–14)
NEUTROPHILS # BLD AUTO: 2.41 K/UL — SIGNIFICANT CHANGE UP (ref 1.8–7.4)
NEUTROPHILS NFR BLD AUTO: 38.7 % — LOW (ref 43–77)
NRBC # BLD: 0 /100 WBCS — SIGNIFICANT CHANGE UP
NRBC # FLD: 0.03 K/UL — HIGH
OB PNL STL: NEGATIVE — SIGNIFICANT CHANGE UP
PLATELET # BLD AUTO: 394 K/UL — SIGNIFICANT CHANGE UP (ref 150–400)
POTASSIUM SERPL-MCNC: 4.7 MMOL/L — SIGNIFICANT CHANGE UP (ref 3.5–5.3)
POTASSIUM SERPL-SCNC: 4.7 MMOL/L — SIGNIFICANT CHANGE UP (ref 3.5–5.3)
PROT SERPL-MCNC: 7.2 G/DL — SIGNIFICANT CHANGE UP (ref 6–8.3)
PROTHROM AB SERPL-ACNC: 11.7 SEC — SIGNIFICANT CHANGE UP (ref 10.6–13.6)
RBC # BLD: 3.92 M/UL — LOW (ref 4.2–5.8)
RBC # FLD: 19.8 % — HIGH (ref 10.3–14.5)
RH IG SCN BLD-IMP: POSITIVE — SIGNIFICANT CHANGE UP
RH IG SCN BLD-IMP: POSITIVE — SIGNIFICANT CHANGE UP
SARS-COV-2 RNA SPEC QL NAA+PROBE: SIGNIFICANT CHANGE UP
SODIUM SERPL-SCNC: 140 MMOL/L — SIGNIFICANT CHANGE UP (ref 135–145)
WBC # BLD: 6.22 K/UL — SIGNIFICANT CHANGE UP (ref 3.8–10.5)
WBC # FLD AUTO: 6.22 K/UL — SIGNIFICANT CHANGE UP (ref 3.8–10.5)

## 2021-09-02 PROCEDURE — 99220: CPT | Mod: 25

## 2021-09-02 PROCEDURE — 93010 ELECTROCARDIOGRAM REPORT: CPT

## 2021-09-02 RX ORDER — LANOLIN ALCOHOL/MO/W.PET/CERES
3 CREAM (GRAM) TOPICAL AT BEDTIME
Refills: 0 | Status: DISCONTINUED | OUTPATIENT
Start: 2021-09-02 | End: 2021-09-06

## 2021-09-02 RX ORDER — LANOLIN ALCOHOL/MO/W.PET/CERES
1 CREAM (GRAM) TOPICAL AT BEDTIME
Refills: 0 | Status: DISCONTINUED | OUTPATIENT
Start: 2021-09-02 | End: 2021-09-02

## 2021-09-02 NOTE — ED PROVIDER NOTE - NSICDXFAMILYHX_GEN_ALL_CORE_FT
FAMILY HISTORY:  Father  Still living? No  FH: colon cancer, Age at diagnosis: Age Unknown    Sibling  Still living? No  FH: colon cancer, Age at diagnosis: Age Unknown

## 2021-09-02 NOTE — ED PROVIDER NOTE - NS ED ROS FT
CONSTITUTIONAL: + Fatigue, weight loss. No fever  EYES: No vision changes  ENT: + chronic postnasal drip.  CV: +Palpitations after activities. No chest pain  PULM: +Shortness of breath  GI: + episodes of constipation and diarrhea, 1 episode of blood on toilet paper  : + Urinary frequency  SKIN: No rashes, swelling  NEURO: No headache, paresthesias CONSTITUTIONAL: + Fatigue, weight loss. No fever  EYES: No vision changes  ENT: + chronic postnasal drip.  CV: +Palpitations,  No chest pain  PULM: +NAVA  GI: + bloody stool  : no dysuria, hematuria   SKIN: No rashes, swelling  NEURO: No headache, paresthesias
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
## 2021-09-02 NOTE — ED CDU PROVIDER INITIAL DAY NOTE - OBJECTIVE STATEMENT
Mr. Mitchell is a 75 YO M w/ PMH of sickle-cell trait, CKD stage 3, HTN, HLD, GERD, gout, who was sent to the ED by his nephrologist after labs showed Hgb of 6.2.  He has noted 3 weeks of fatigue, requiring him to sit down after activities and shortness of breath. The shortness of breath has worsened in the last 2 weeks, making him unable to carry on long conversations. He has also had feelings of palpitations after performing activities such as walking or climbing the stairs. He has intermittent pain in the left epigastric region, described as pain on his "rib," which he says is different than his usual heartburn. He had one episode of bright red blood seen on toilet paper. He is unclear whether his stools have blood, notes they are usually dark. His stools alternate between diarrhea and constipation. He notes that his most recent colonoscopy was 3 years ago, and he was told he had 6 polyps removed. He has noted gradual weight loss of about 15 lbs since last year    ED course T(F): 98.9  HR: 70  BP: 131/78  RR: 16  SpO2: 100%. lungs cta, abd ntnd. no bloody bm in ED. H&H 6.6/25. Creatnine 2.18, unknown baseline

## 2021-09-02 NOTE — ED PROVIDER NOTE - ATTENDING CONTRIBUTION TO CARE
agree with medical student note    "75 YO M w/ PMH of sickle-cell trait, CKD stage 3, HTN, HLD, GERD, gout, who was sent to the ED by his nephrologist after labs showed Hgb of 6.2. He received these labs as a routine check, as his CKD has progressed to stage 3. He notes he was previously told he was anemic by his PMD on labs earlier in the year. He has noted 3 weeks of fatigue, requiring him to sit down after activities and shortness of breath. The shortness of breath has worsened in the last 2 weeks, making him unable to carry on long conversations. He has also had feelings of palpitations after performing activities such as walking or climbing the stairs. He has intermittent pain in the left epigastric region, described as pain on his "rib," which he says is different than his usual heartburn. He had one episode of bright red blood seen on toilet paper. He is unclear whether his stools have blood, notes they are usually dark. His stools alternate between diarrhea and constipation. He notes that his most recent colonoscopy was 3 years ago, and he was told he had 6 polyps removed. He has noted gradual weight loss of about 15 lbs since last year."    PE: well appearing; VSS; pallor; CTAB/l; s1 s2 no m/r/g abd soft/NT/ND ext: no edema Guiac: brown stool    Imp: anemia vs GI bleed?  pt has been unable to get repeat colonscopy due to at first insurance reasons than not liking GI doc

## 2021-09-02 NOTE — ED ADULT TRIAGE NOTE - CHIEF COMPLAINT QUOTE
pt sent by MD for Hg of 6.2 . pt endorses fatigue x several weeks. pt denies chest pain, sob. pt endorses one episode of BRBPR several weeks ago. no blood thinners. hx of HTN, HLD, CKD

## 2021-09-02 NOTE — ED PROVIDER NOTE - CLINICAL SUMMARY MEDICAL DECISION MAKING FREE TEXT BOX
Mr. Mitchell is a 75 YO M w/ PMH of sickle-cell trait, CKD stage 3, HTN, HLD, GERD, gout, who was sent to the ED by his nephrologist after labs showed Hgb of 6.2, with family history of colon cancer, weight loss, colonoscopy with 6 polyps previously and 1 episode blood on toilet paper.  Patient appears in no acute distress, vital signs stable. Will obtain guaiac test.
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
## 2021-09-02 NOTE — ED ADULT NURSE NOTE - OBJECTIVE STATEMENT
75y/o male A&ox4, ambulatory received in [facility]. pt c/o sent in by pmd for hbg of 6.2. hx of anemia, received previous blood transfusion many years ago. c/o fatigue when ambulating, mild sob. denies dizziness, lightheadedness, headache, rectal bleeding, n/v/d. not pale in appearance. pt following commands appropriately at this time. respirations even and unlabored. nsr on cardiac monitor. vs as noted in flowsheet. pt in NAD. 20g iv placed in RAC, labs drawn and sent. md at bedside for eval. bed in lowest position, side rails up, call bell in hand, safety maintained. awaiting further orders. will continue to monitor.

## 2021-09-02 NOTE — ED PROVIDER NOTE - PHYSICAL EXAMINATION
GENERAL: Thin man, sitting in bed, no acute distress  EYES: Conjunctiva noninjected or pale, sclera anicteric  HENT: NC/AT  NECK: Supple  LUNG: Nonlabored respirations  CV: RRR  ABDOMEN: Non-tender abdomen, bowel sounds present  MSK: No swelling  SKIN: Brown macules on lower extremities  NEURO: AAOx4 (to person, place, time, event)  PSYCH: Appropriate mood and affect GENERAL: Thin man, sitting in bed, no acute distress  EYES: Conjunctiva non-injected or pale, sclera anicteric  HENT: NC/AT  NECK: Supple  LUNG: Nonlabored respirations  CV: RRR  ABDOMEN: Non-tender abdomen, bowel sounds present. no gross blood on rectal exam,  MSK: No swelling  SKIN: Brown macules on lower extremities  NEURO: AAOx4 (to person, place, time, event)  PSYCH: Appropriate mood and affect

## 2021-09-02 NOTE — ED CDU PROVIDER INITIAL DAY NOTE - ATTENDING CONTRIBUTION TO CARE
agree with resident note    my initial note  ""77 YO M w/ PMH of sickle-cell trait, CKD stage 3, HTN, HLD, GERD, gout, who was sent to the ED by his nephrologist after labs showed Hgb of 6.2. He received these labs as a routine check, as his CKD has progressed to stage 3. He notes he was previously told he was anemic by his PMD on labs earlier in the year. He has noted 3 weeks of fatigue, requiring him to sit down after activities and shortness of breath. The shortness of breath has worsened in the last 2 weeks, making him unable to carry on long conversations. He has also had feelings of palpitations after performing activities such as walking or climbing the stairs. He has intermittent pain in the left epigastric region, described as pain on his "rib," which he says is different than his usual heartburn. He had one episode of bright red blood seen on toilet paper. He is unclear whether his stools have blood, notes they are usually dark. His stools alternate between diarrhea and constipation. He notes that his most recent colonoscopy was 3 years ago, and he was told he had 6 polyps removed. He has noted gradual weight loss of about 15 lbs since last year."    PE: well appearing; VSS; pallor; CTAB/l; s1 s2 no m/r/g abd soft/NT/ND ext: no edema Guiac: brown stool    Imp: anemia vs GI bleed?  pt has been unable to get repeat colonscopy due to at first insurance reasons than not liking GI doc."    placed in CDU for transfusion

## 2021-09-02 NOTE — ED PROVIDER NOTE - OBJECTIVE STATEMENT
Mr. Mitchell is a 75 YO M w/ PMH of sickle-cell trait, CKD stage 3, HTN, HLD, GERD, gout, who was sent to the ED by his nephrologist after labs showed Hgb of 6.2. He received these labs as a routine check, as his CKD has progressed to stage 3. He notes he was previously told he was anemic by his PMD on labs earlier in the year. He has noted 3 weeks of fatigue, requiring him to sit down after activities and shortness of breath. The shortness of breath has worsened in the last 2 weeks, making him unable to carry on long conversations. He has also had feelings of palpitations after performing activities such as walking or climbing the stairs. He has intermittent pain in the left epigastric region, described as pain on his "rib," which he says is different than his usual heartburn. He had one episode of bright red blood seen on toilet paper. He is unclear whether his stools have blood, notes they are usually dark. His stools alternate between diarrhea and constipation. He notes that his most recent colonoscopy was 3 years ago, and he was told he had 6 polyps removed. He has noted gradual weight loss of about 15 lbs since last year.

## 2021-09-02 NOTE — ED PROVIDER NOTE - NSICDXPASTMEDICALHX_GEN_ALL_CORE_FT
PAST MEDICAL HISTORY:  Abnormal stress test     Colorectal polyp detected on colonoscopy x 6, 3 years ago    GERD (gastroesophageal reflux disease)     Gout     HTN (hypertension)     Hyperlipidemia     Sickle cell trait     Stage 3 chronic kidney disease

## 2021-09-02 NOTE — ED CDU PROVIDER INITIAL DAY NOTE - MEDICAL DECISION MAKING DETAILS
Mr. Mitchell is a 75 YO M w/ PMH of sickle-cell trait, CKD stage 3, HTN, HLD, GERD, gout, who was sent to the ED due to low hemoglobin, weakness, and melena. likely iron deficiency anemia 2/2 to melena. Plan to transfuse 2 units prbc and urgent follow up GI for repeat colonoscopy.

## 2021-09-03 VITALS
DIASTOLIC BLOOD PRESSURE: 79 MMHG | TEMPERATURE: 99 F | RESPIRATION RATE: 16 BRPM | HEART RATE: 65 BPM | OXYGEN SATURATION: 100 % | SYSTOLIC BLOOD PRESSURE: 119 MMHG

## 2021-09-03 LAB
ALBUMIN SERPL ELPH-MCNC: 3.7 G/DL — SIGNIFICANT CHANGE UP (ref 3.3–5)
ALP SERPL-CCNC: 57 U/L — SIGNIFICANT CHANGE UP (ref 40–120)
ALT FLD-CCNC: 15 U/L — SIGNIFICANT CHANGE UP (ref 4–41)
ANION GAP SERPL CALC-SCNC: 14 MMOL/L — SIGNIFICANT CHANGE UP (ref 7–14)
ANISOCYTOSIS BLD QL: SLIGHT — SIGNIFICANT CHANGE UP
AST SERPL-CCNC: 14 U/L — SIGNIFICANT CHANGE UP (ref 4–40)
BASOPHILS # BLD AUTO: 0 K/UL — SIGNIFICANT CHANGE UP (ref 0–0.2)
BASOPHILS NFR BLD AUTO: 0 % — SIGNIFICANT CHANGE UP (ref 0–2)
BILIRUB SERPL-MCNC: 0.7 MG/DL — SIGNIFICANT CHANGE UP (ref 0.2–1.2)
BUN SERPL-MCNC: 24 MG/DL — HIGH (ref 7–23)
CALCIUM SERPL-MCNC: 8.4 MG/DL — SIGNIFICANT CHANGE UP (ref 8.4–10.5)
CHLORIDE SERPL-SCNC: 107 MMOL/L — SIGNIFICANT CHANGE UP (ref 98–107)
CO2 SERPL-SCNC: 23 MMOL/L — SIGNIFICANT CHANGE UP (ref 22–31)
CREAT SERPL-MCNC: 1.82 MG/DL — HIGH (ref 0.5–1.3)
EOSINOPHIL # BLD AUTO: 0.17 K/UL — SIGNIFICANT CHANGE UP (ref 0–0.5)
EOSINOPHIL NFR BLD AUTO: 2.7 % — SIGNIFICANT CHANGE UP (ref 0–6)
GIANT PLATELETS BLD QL SMEAR: PRESENT — SIGNIFICANT CHANGE UP
GLUCOSE SERPL-MCNC: 81 MG/DL — SIGNIFICANT CHANGE UP (ref 70–99)
HCT VFR BLD CALC: 27.8 % — LOW (ref 39–50)
HGB BLD-MCNC: 8.2 G/DL — LOW (ref 13–17)
IANC: 3.35 K/UL — SIGNIFICANT CHANGE UP (ref 1.5–8.5)
LYMPHOCYTES # BLD AUTO: 1.37 K/UL — SIGNIFICANT CHANGE UP (ref 1–3.3)
LYMPHOCYTES # BLD AUTO: 21.2 % — SIGNIFICANT CHANGE UP (ref 13–44)
MANUAL SMEAR VERIFICATION: SIGNIFICANT CHANGE UP
MCHC RBC-ENTMCNC: 19.8 PG — LOW (ref 27–34)
MCHC RBC-ENTMCNC: 29.5 GM/DL — LOW (ref 32–36)
MCV RBC AUTO: 67 FL — LOW (ref 80–100)
MICROCYTES BLD QL: SLIGHT — SIGNIFICANT CHANGE UP
MONOCYTES # BLD AUTO: 0.28 K/UL — SIGNIFICANT CHANGE UP (ref 0–0.9)
MONOCYTES NFR BLD AUTO: 4.4 % — SIGNIFICANT CHANGE UP (ref 2–14)
NEUTROPHILS # BLD AUTO: 4.44 K/UL — SIGNIFICANT CHANGE UP (ref 1.8–7.4)
NEUTROPHILS NFR BLD AUTO: 68.1 % — SIGNIFICANT CHANGE UP (ref 43–77)
NEUTS BAND # BLD: 0.9 % — SIGNIFICANT CHANGE UP (ref 0–6)
PLAT MORPH BLD: NORMAL — SIGNIFICANT CHANGE UP
PLATELET # BLD AUTO: 290 K/UL — SIGNIFICANT CHANGE UP (ref 150–400)
PLATELET COUNT - ESTIMATE: NORMAL — SIGNIFICANT CHANGE UP
POTASSIUM SERPL-MCNC: 4 MMOL/L — SIGNIFICANT CHANGE UP (ref 3.5–5.3)
POTASSIUM SERPL-SCNC: 4 MMOL/L — SIGNIFICANT CHANGE UP (ref 3.5–5.3)
PROT SERPL-MCNC: 6.2 G/DL — SIGNIFICANT CHANGE UP (ref 6–8.3)
RBC # BLD: 4.15 M/UL — LOW (ref 4.2–5.8)
RBC # FLD: 22.5 % — HIGH (ref 10.3–14.5)
RBC BLD AUTO: ABNORMAL
SODIUM SERPL-SCNC: 144 MMOL/L — SIGNIFICANT CHANGE UP (ref 135–145)
VARIANT LYMPHS # BLD: 2.7 % — SIGNIFICANT CHANGE UP (ref 0–6)
WBC # BLD: 6.44 K/UL — SIGNIFICANT CHANGE UP (ref 3.8–10.5)
WBC # FLD AUTO: 6.44 K/UL — SIGNIFICANT CHANGE UP (ref 3.8–10.5)

## 2021-09-03 PROCEDURE — 99217: CPT

## 2021-09-03 NOTE — ED CDU PROVIDER DISPOSITION NOTE - CLINICAL COURSE
pt presented for abnormal outpatient labs with a low hemoglobin. negative guaiac, given 2u of pRBC with good response. stable for discharge and GI/PMD follow up.

## 2021-09-03 NOTE — ED CDU PROVIDER SUBSEQUENT DAY NOTE - MEDICAL DECISION MAKING DETAILS
Mr. Mitchell is a 75 YO M w/ PMH of sickle-cell trait, CKD stage 3, HTN, HLD, GERD, gout, who was sent to the ED due to low hemoglobin, weakness, and melena. likely iron deficiency anemia 2/2 to melena.     Plan to transfuse 2 units prbc   urgent follow up GI for repeat colonoscopy

## 2021-09-03 NOTE — ED CDU PROVIDER DISPOSITION NOTE - NSFOLLOWUPINSTRUCTIONS_ED_ALL_ED_FT
you were seen in the emergency department for low hemoglobin.   you received a blood transfusion of 2 units of packed red blood cells with improvement of your hemoglobin levels.   please continue taking all your prescribed home medications.   follow up with your nephrologist and primary care doctor.   Follow up with your gastroenterologist.   you will be contacted by a hospital representative to help schedule an appointment with a gastroenterologist in the next 1-2 weeks.   please return to the emergency department for any new or worsening symptoms including but not limited to the following: shortness of breath, loss of consciousness, palpitations, bloody stool, chest pain.

## 2021-09-03 NOTE — ED CDU PROVIDER SUBSEQUENT DAY NOTE - ATTENDING CONTRIBUTION TO CARE
DR. FARLEY, ATTENDING MD-  I performed a face to face bedside interview with the patient regarding history of present illness, review of symptoms and past medical history. I completed an independent physical exam.  I have discussed the patient's plan of care with the resident.   Documentation as above in the note.

## 2021-09-03 NOTE — ED CDU PROVIDER SUBSEQUENT DAY NOTE - PROGRESS NOTE DETAILS
Georgia Davis PGY-2 patient well appearing, NAD. satting well on RA. will continue to monitor.  Email sent to [email or web address] for follow up Pt feels much improved after transfusion.  No sob or palpitations.  Will dc home with pcp f/u. andreina boyer pgy3: pt feeling well with no events overnight. improvement with hemoglobin 6.6 to 8.2 s/p 2u RBC. without SOB, palpitations. will dc home with PMD follow up and will give urgent follow up for GI. GI was emailed overnight.

## 2021-09-03 NOTE — ED CDU PROVIDER DISPOSITION NOTE - PATIENT PORTAL LINK FT
You can access the FollowMyHealth Patient Portal offered by [facility] by registering at the following website: [email or web address]. By joining [employer]’s FollowMyHealth portal, you will also be able to view your health information using other applications (apps) compatible with our system.

## 2021-11-13 ENCOUNTER — EMERGENCY (EMERGENCY)
Facility: HOSPITAL | Age: 76
LOS: 1 days | Discharge: HOME | End: 2021-11-13
Attending: EMERGENCY MEDICINE | Admitting: EMERGENCY MEDICINE
Payer: MEDICARE

## 2021-11-13 VITALS
HEART RATE: 73 BPM | RESPIRATION RATE: 17 BRPM | HEIGHT: 68 IN | TEMPERATURE: 98 F | DIASTOLIC BLOOD PRESSURE: 74 MMHG | OXYGEN SATURATION: 100 % | SYSTOLIC BLOOD PRESSURE: 138 MMHG

## 2021-11-13 VITALS
OXYGEN SATURATION: 100 % | RESPIRATION RATE: 16 BRPM | DIASTOLIC BLOOD PRESSURE: 75 MMHG | TEMPERATURE: 98 F | SYSTOLIC BLOOD PRESSURE: 136 MMHG | HEART RATE: 70 BPM

## 2021-11-13 DIAGNOSIS — Z98.890 OTHER SPECIFIED POSTPROCEDURAL STATES: Chronic | ICD-10-CM

## 2021-11-13 PROBLEM — K21.9 GASTRO-ESOPHAGEAL REFLUX DISEASE WITHOUT ESOPHAGITIS: Chronic | Status: ACTIVE | Noted: 2021-09-02

## 2021-11-13 LAB
ALBUMIN SERPL ELPH-MCNC: 4.4 G/DL — SIGNIFICANT CHANGE UP (ref 3.3–5)
ALP SERPL-CCNC: 60 U/L — SIGNIFICANT CHANGE UP (ref 40–120)
ALT FLD-CCNC: 16 U/L — SIGNIFICANT CHANGE UP (ref 4–41)
ANION GAP SERPL CALC-SCNC: 14 MMOL/L — SIGNIFICANT CHANGE UP (ref 7–14)
APTT BLD: 28.4 SEC — SIGNIFICANT CHANGE UP (ref 27–36.3)
AST SERPL-CCNC: 20 U/L — SIGNIFICANT CHANGE UP (ref 4–40)
BASOPHILS # BLD AUTO: 0.05 K/UL — SIGNIFICANT CHANGE UP (ref 0–0.2)
BASOPHILS NFR BLD AUTO: 0.7 % — SIGNIFICANT CHANGE UP (ref 0–2)
BILIRUB SERPL-MCNC: 0.3 MG/DL — SIGNIFICANT CHANGE UP (ref 0.2–1.2)
BLD GP AB SCN SERPL QL: NEGATIVE — SIGNIFICANT CHANGE UP
BLOOD GAS VENOUS COMPREHENSIVE RESULT: SIGNIFICANT CHANGE UP
BUN SERPL-MCNC: 29 MG/DL — HIGH (ref 7–23)
CALCIUM SERPL-MCNC: 9 MG/DL — SIGNIFICANT CHANGE UP (ref 8.4–10.5)
CHLORIDE SERPL-SCNC: 103 MMOL/L — SIGNIFICANT CHANGE UP (ref 98–107)
CO2 SERPL-SCNC: 23 MMOL/L — SIGNIFICANT CHANGE UP (ref 22–31)
CREAT SERPL-MCNC: 2.09 MG/DL — HIGH (ref 0.5–1.3)
EOSINOPHIL # BLD AUTO: 0.17 K/UL — SIGNIFICANT CHANGE UP (ref 0–0.5)
EOSINOPHIL NFR BLD AUTO: 2.4 % — SIGNIFICANT CHANGE UP (ref 0–6)
GLUCOSE SERPL-MCNC: 91 MG/DL — SIGNIFICANT CHANGE UP (ref 70–99)
HCT VFR BLD CALC: 25.8 % — LOW (ref 39–50)
HGB BLD-MCNC: 7.1 G/DL — LOW (ref 13–17)
IANC: 3.92 K/UL — SIGNIFICANT CHANGE UP (ref 1.5–8.5)
IMM GRANULOCYTES NFR BLD AUTO: 0.3 % — SIGNIFICANT CHANGE UP (ref 0–1.5)
INR BLD: 1.02 RATIO — SIGNIFICANT CHANGE UP (ref 0.88–1.16)
LYMPHOCYTES # BLD AUTO: 1.95 K/UL — SIGNIFICANT CHANGE UP (ref 1–3.3)
LYMPHOCYTES # BLD AUTO: 27.9 % — SIGNIFICANT CHANGE UP (ref 13–44)
MCHC RBC-ENTMCNC: 18.7 PG — LOW (ref 27–34)
MCHC RBC-ENTMCNC: 27.5 GM/DL — LOW (ref 32–36)
MCV RBC AUTO: 68.1 FL — LOW (ref 80–100)
MONOCYTES # BLD AUTO: 0.88 K/UL — SIGNIFICANT CHANGE UP (ref 0–0.9)
MONOCYTES NFR BLD AUTO: 12.6 % — SIGNIFICANT CHANGE UP (ref 2–14)
NEUTROPHILS # BLD AUTO: 3.92 K/UL — SIGNIFICANT CHANGE UP (ref 1.8–7.4)
NEUTROPHILS NFR BLD AUTO: 56.1 % — SIGNIFICANT CHANGE UP (ref 43–77)
NRBC # BLD: 0 /100 WBCS — SIGNIFICANT CHANGE UP
NRBC # FLD: 0 K/UL — SIGNIFICANT CHANGE UP
OB PNL STL: NEGATIVE — SIGNIFICANT CHANGE UP
PLATELET # BLD AUTO: 244 K/UL — SIGNIFICANT CHANGE UP (ref 150–400)
POTASSIUM SERPL-MCNC: 3.7 MMOL/L — SIGNIFICANT CHANGE UP (ref 3.5–5.3)
POTASSIUM SERPL-SCNC: 3.7 MMOL/L — SIGNIFICANT CHANGE UP (ref 3.5–5.3)
PROT SERPL-MCNC: 7.5 G/DL — SIGNIFICANT CHANGE UP (ref 6–8.3)
PROTHROM AB SERPL-ACNC: 11.6 SEC — SIGNIFICANT CHANGE UP (ref 10.6–13.6)
RBC # BLD: 3.79 M/UL — LOW (ref 4.2–5.8)
RBC # FLD: 21.7 % — HIGH (ref 10.3–14.5)
RH IG SCN BLD-IMP: POSITIVE — SIGNIFICANT CHANGE UP
SARS-COV-2 RNA SPEC QL NAA+PROBE: SIGNIFICANT CHANGE UP
SODIUM SERPL-SCNC: 140 MMOL/L — SIGNIFICANT CHANGE UP (ref 135–145)
WBC # BLD: 6.99 K/UL — SIGNIFICANT CHANGE UP (ref 3.8–10.5)
WBC # FLD AUTO: 6.99 K/UL — SIGNIFICANT CHANGE UP (ref 3.8–10.5)

## 2021-11-13 PROCEDURE — 99284 EMERGENCY DEPT VISIT MOD MDM: CPT | Mod: GC

## 2021-11-13 NOTE — ED PROVIDER NOTE - OBJECTIVE STATEMENT
Pt is a 77yo M w/ PMH of HTN, HLD, GERD, CKD3, chronic back and neck pain w/ opioid dependence p/w low Hemoglobin (6) noted by outpt routine labs. Pt otherwise feels well w/ no endorsement of active bleeding noted. Endorses generalized weakness eileen chronic in nature and chronic SOB and constipation w/ passage of hard stool. Denies any HA, palpitations, acute CP, SOB, abd pain, N/V, or diarrhea.     He underwent EGD/C-scope within the past two weeks w/ Dr. Vicente Wheeler which showed gastritis and angiodysplasia of duodenum without active bleeding and AVM of ascending colon and hemorrhoids without active bleeding. He is scheduled for an outpt capsule study next week.

## 2021-11-13 NOTE — ED ADULT NURSE NOTE - OBJECTIVE STATEMENT
Break covering RN: 77 y/o M received to room 14 c/o low H&H. Pt a&ox4 and ambulatory. Pt states he was recently admitted to the hospital for low h&h received 2 units of prbc and was dc'd.  PT states he recently did a colonoscopy and was told he is prone to abnormal bleeding. Pt went to pcp on Wednesday had blood work performed and was told his hemoglobin was 6. pt endorsing mild sob and baseline and generalized weakness. Pt respirations even and unlabored. Pt abd soft nontender nondistended. pt skin intact. VS as noted, call bell in reach, comfort measures provided, 18G IV placed L forearm, labs drawn and sent, will continue to monitor.

## 2021-11-13 NOTE — ED PROVIDER NOTE - PHYSICAL EXAMINATION
GENERAL: NAD, lying in bed comfortably  HEAD:  Atraumatic, Normocephalic  EYES: EOMI, PERRLA, conjunctiva and sclera clear  ENT: Moist mucous membranes  NECK: Supple, No JVD  CHEST/LUNG: CTABL; No rales, rhonchi, wheezing, or rubs. Unlabored respirations  HEART: RRR. No M/R/G  ABDOMEN: Soft, nontender, non-distended, normoactive BS. No hepatomegaly  RECTAL: External skin tag, scant brown stool  EXTREMITIES:  2+ Peripheral Pulses, brisk capillary refill. No clubbing, cyanosis, or edema  NERVOUS SYSTEM:  Alert & Oriented X3, speech clear. No deficits, CN II-XII intact. Normal sensation   MSK: FROM all 4 extremities, full and equal strength  PSYCH: Normal affect, normal speech, normal behavior  SKIN: No rashes or lesions

## 2021-11-13 NOTE — ED ADULT NURSE NOTE - NSIMPLEMENTINTERV_GEN_ALL_ED
Implemented All Universal Safety Interventions:  [place] to call system. Call bell, personal items and telephone within reach. Instruct patient to call for assistance. Room bathroom lighting operational. Non-slip footwear when patient is off stretcher. Physically safe environment: no spills, clutter or unnecessary equipment. Stretcher in lowest position, wheels locked, appropriate side rails in place.

## 2021-11-13 NOTE — ED ADULT NURSE REASSESSMENT NOTE - NS ED NURSE REASSESS COMMENT FT1
pt. A&Ox4, awake and resting. pt. denies any pain at this time. pt. in no acute distress. 1 unit of PRBCs running over 2 hours. transfusion initiated at 1620. VS as noted and documented at initiation and 15 minutes after. pt. requesting dinner at this time.
No s/s of distress, no back pain/chest pain, hypotension, no fever/chills, no pain at the infusion site, no tachycardia, wheezing, cyanosis, urticaria, no rash. VS post completion of Transfusion. pt. TBDC. no acute distress noted.
Report received from Beatriz RN. pt. A&Ox4, awake and resting. pt. endorses no pain at this time. pt. in no acute distress. Labs sent as ordered. VS as noted.

## 2021-11-13 NOTE — ED ADULT TRIAGE NOTE - CHIEF COMPLAINT QUOTE
pt told his Hemoglobin is 6. denies active bleeding. pt received 2 units PRBCs and Platelets at his last visit.

## 2021-11-13 NOTE — ED PROVIDER NOTE - CARE PROVIDER_API CALL
Frederic Pineda [facility], [facility]  [id number] [place] [address]  Phone: [phone]  Fax: [phone]  Follow Up Time:

## 2021-11-13 NOTE — ED PROVIDER NOTE - NS ED ROS FT
CONSTITUTIONAL: Weakness. No fevers or chills  EYES: No vision changes  ENT: No vertigo or throat pain   NECK: No pain or stiffness  RESPIRATORY: No cough, wheezing, hemoptysis; No shortness of breath  CARDIOVASCULAR: No chest pain or palpitations  GASTROINTESTINAL: Constipation. No abdominal or epigastric pain. No nausea, vomiting, or hematemesis; No diarrhea. No melena or hematochezia.  GENITOURINARY: No dysuria, frequency or hematuria  NEUROLOGICAL: No numbness or weakness  PSYCH: No anxiety, depression, or behavior changes  All other review of systems is negative unless indicated above.

## 2021-11-13 NOTE — ED PROVIDER NOTE - NSFOLLOWUPINSTRUCTIONS_ED_ALL_ED_FT
You presented to the hospital with anemia (low blood count) noted on your outpatient labs. You were experiencing symptoms of shortness of breath and fatigue which can be due to the low blood counts. You were transfused 1 unit packed red blood cells for a hemoglobin of 7.1. Please follow up with your primary care provider for further workup as this can be due to chronic disease and with your gastroenterologist for the capsule study although rectal exam on encounter here did not show blood. Please return to the emergency room if you begin to experience abnormal bleeding or notice large amounts of blood in your stool or black stool.

## 2021-11-13 NOTE — ED PROVIDER NOTE - CLINICAL SUMMARY MEDICAL DECISION MAKING FREE TEXT BOX
Pt is a 77yo M w/ PMH of HTN, HLD, GERD, CKD3, chronic back and neck pain w/ opioid dependence p/w low Hemoglobin (6) noted by outpt routine labs. Can be i/s/o ACD vs GIB given angiodysplasias noted on recent intervention. Will obtain labs and transfuse as needed

## 2021-11-13 NOTE — ED PROVIDER NOTE - PATIENT PORTAL LINK FT
You can access the FollowMyHealth Patient Portal offered by [facility] by registering at the following website: [email or web address]. By joining [employer]’s FollowMyHealth portal, you will also be able to view your health information using other applications (apps) compatible with our system.

## 2021-11-13 NOTE — ED PROVIDER NOTE - ATTENDING CONTRIBUTION TO CARE
76 year old male with a history of renal insufficiency, chronic pain, htn, HLD, GERD and chronic anemia was sent to the ED because of a low H/H. NO active bleeding, abd soft, nt, Plan is to transfuse and dc to follow up with pmd. Precautions reviewed.

## 2021-12-03 ENCOUNTER — INPATIENT (INPATIENT)
Facility: HOSPITAL | Age: 76
LOS: 0 days | Discharge: HOME | End: 2021-12-04
Attending: HOSPITALIST | Admitting: HOSPITALIST
Payer: MEDICARE

## 2021-12-03 VITALS
RESPIRATION RATE: 16 BRPM | HEIGHT: 68 IN | DIASTOLIC BLOOD PRESSURE: 53 MMHG | OXYGEN SATURATION: 100 % | TEMPERATURE: 98 F | HEART RATE: 73 BPM | SYSTOLIC BLOOD PRESSURE: 106 MMHG

## 2021-12-03 DIAGNOSIS — Z98.890 OTHER SPECIFIED POSTPROCEDURAL STATES: Chronic | ICD-10-CM

## 2021-12-03 DIAGNOSIS — K21.9 GASTRO-ESOPHAGEAL REFLUX DISEASE WITHOUT ESOPHAGITIS: ICD-10-CM

## 2021-12-03 DIAGNOSIS — N40.1 BENIGN PROSTATIC HYPERPLASIA WITH LOWER URINARY TRACT SYMPTOMS: ICD-10-CM

## 2021-12-03 DIAGNOSIS — I10 ESSENTIAL (PRIMARY) HYPERTENSION: ICD-10-CM

## 2021-12-03 DIAGNOSIS — R53.1 WEAKNESS: ICD-10-CM

## 2021-12-03 DIAGNOSIS — K92.2 GASTROINTESTINAL HEMORRHAGE, UNSPECIFIED: ICD-10-CM

## 2021-12-03 DIAGNOSIS — D62 ACUTE POSTHEMORRHAGIC ANEMIA: ICD-10-CM

## 2021-12-03 DIAGNOSIS — D64.9 ANEMIA, UNSPECIFIED: ICD-10-CM

## 2021-12-03 DIAGNOSIS — Z29.9 ENCOUNTER FOR PROPHYLACTIC MEASURES, UNSPECIFIED: ICD-10-CM

## 2021-12-03 DIAGNOSIS — N18.30 CHRONIC KIDNEY DISEASE, STAGE 3 UNSPECIFIED: ICD-10-CM

## 2021-12-03 DIAGNOSIS — N18.9 CHRONIC KIDNEY DISEASE, UNSPECIFIED: ICD-10-CM

## 2021-12-03 LAB
ALBUMIN SERPL ELPH-MCNC: 4.4 G/DL — SIGNIFICANT CHANGE UP (ref 3.3–5)
ALP SERPL-CCNC: 58 U/L — SIGNIFICANT CHANGE UP (ref 40–120)
ALT FLD-CCNC: 13 U/L — SIGNIFICANT CHANGE UP (ref 4–41)
ANION GAP SERPL CALC-SCNC: 12 MMOL/L — SIGNIFICANT CHANGE UP (ref 7–14)
ANISOCYTOSIS BLD QL: SLIGHT — SIGNIFICANT CHANGE UP
APTT BLD: 30.6 SEC — SIGNIFICANT CHANGE UP (ref 27–36.3)
AST SERPL-CCNC: 21 U/L — SIGNIFICANT CHANGE UP (ref 4–40)
BASE EXCESS BLDV CALC-SCNC: -0.8 MMOL/L — SIGNIFICANT CHANGE UP (ref -2–3)
BASOPHILS # BLD AUTO: 0.03 K/UL — SIGNIFICANT CHANGE UP (ref 0–0.2)
BASOPHILS # BLD AUTO: 0.05 K/UL — SIGNIFICANT CHANGE UP (ref 0–0.2)
BASOPHILS NFR BLD AUTO: 0.5 % — SIGNIFICANT CHANGE UP (ref 0–2)
BASOPHILS NFR BLD AUTO: 0.9 % — SIGNIFICANT CHANGE UP (ref 0–2)
BILIRUB SERPL-MCNC: 0.4 MG/DL — SIGNIFICANT CHANGE UP (ref 0.2–1.2)
BLD GP AB SCN SERPL QL: NEGATIVE — SIGNIFICANT CHANGE UP
BLOOD GAS VENOUS COMPREHENSIVE RESULT: SIGNIFICANT CHANGE UP
BUN SERPL-MCNC: 26 MG/DL — HIGH (ref 7–23)
CALCIUM SERPL-MCNC: 9 MG/DL — SIGNIFICANT CHANGE UP (ref 8.4–10.5)
CHLORIDE BLDV-SCNC: 105 MMOL/L — SIGNIFICANT CHANGE UP (ref 96–108)
CHLORIDE SERPL-SCNC: 101 MMOL/L — SIGNIFICANT CHANGE UP (ref 98–107)
CO2 BLDV-SCNC: 28.3 MMOL/L — HIGH (ref 22–26)
CO2 SERPL-SCNC: 26 MMOL/L — SIGNIFICANT CHANGE UP (ref 22–31)
CREAT SERPL-MCNC: 2.18 MG/DL — HIGH (ref 0.5–1.3)
DACRYOCYTES BLD QL SMEAR: SIGNIFICANT CHANGE UP
ELLIPTOCYTES BLD QL SMEAR: SIGNIFICANT CHANGE UP
EOSINOPHIL # BLD AUTO: 0.05 K/UL — SIGNIFICANT CHANGE UP (ref 0–0.5)
EOSINOPHIL # BLD AUTO: 0.16 K/UL — SIGNIFICANT CHANGE UP (ref 0–0.5)
EOSINOPHIL NFR BLD AUTO: 0.9 % — SIGNIFICANT CHANGE UP (ref 0–6)
EOSINOPHIL NFR BLD AUTO: 2.9 % — SIGNIFICANT CHANGE UP (ref 0–6)
GAS PNL BLDV: 138 MMOL/L — SIGNIFICANT CHANGE UP (ref 136–145)
GIANT PLATELETS BLD QL SMEAR: PRESENT — SIGNIFICANT CHANGE UP
GLUCOSE BLDV-MCNC: 70 MG/DL — SIGNIFICANT CHANGE UP (ref 70–99)
GLUCOSE SERPL-MCNC: 80 MG/DL — SIGNIFICANT CHANGE UP (ref 70–99)
HCO3 BLDV-SCNC: 26 MMOL/L — SIGNIFICANT CHANGE UP (ref 22–29)
HCT VFR BLD CALC: 22.8 % — LOW (ref 39–50)
HCT VFR BLD CALC: 26.8 % — LOW (ref 39–50)
HCT VFR BLDA CALC: 22 % — LOW (ref 39–51)
HGB BLD CALC-MCNC: 7.4 G/DL — LOW (ref 13–17)
HGB BLD-MCNC: 6.5 G/DL — CRITICAL LOW (ref 13–17)
HGB BLD-MCNC: 7.4 G/DL — LOW (ref 13–17)
IANC: 2.56 K/UL — SIGNIFICANT CHANGE UP (ref 1.5–8.5)
IANC: 3.21 K/UL — SIGNIFICANT CHANGE UP (ref 1.5–8.5)
IMM GRANULOCYTES NFR BLD AUTO: 0.4 % — SIGNIFICANT CHANGE UP (ref 0–1.5)
INR BLD: 1.03 RATIO — SIGNIFICANT CHANGE UP (ref 0.88–1.16)
LACTATE BLDV-MCNC: 1.8 MMOL/L — SIGNIFICANT CHANGE UP (ref 0.5–2)
LIDOCAIN IGE QN: 37 U/L — SIGNIFICANT CHANGE UP (ref 7–60)
LYMPHOCYTES # BLD AUTO: 1.14 K/UL — SIGNIFICANT CHANGE UP (ref 1–3.3)
LYMPHOCYTES # BLD AUTO: 1.97 K/UL — SIGNIFICANT CHANGE UP (ref 1–3.3)
LYMPHOCYTES # BLD AUTO: 20.4 % — SIGNIFICANT CHANGE UP (ref 13–44)
LYMPHOCYTES # BLD AUTO: 36 % — SIGNIFICANT CHANGE UP (ref 13–44)
MAGNESIUM SERPL-MCNC: 1.8 MG/DL — SIGNIFICANT CHANGE UP (ref 1.6–2.6)
MANUAL SMEAR VERIFICATION: SIGNIFICANT CHANGE UP
MCHC RBC-ENTMCNC: 18.7 PG — LOW (ref 27–34)
MCHC RBC-ENTMCNC: 19 PG — LOW (ref 27–34)
MCHC RBC-ENTMCNC: 27.6 GM/DL — LOW (ref 32–36)
MCHC RBC-ENTMCNC: 28.5 GM/DL — LOW (ref 32–36)
MCV RBC AUTO: 66.5 FL — LOW (ref 80–100)
MCV RBC AUTO: 67.7 FL — LOW (ref 80–100)
MICROCYTES BLD QL: SLIGHT — SIGNIFICANT CHANGE UP
MONOCYTES # BLD AUTO: 0.16 K/UL — SIGNIFICANT CHANGE UP (ref 0–0.9)
MONOCYTES # BLD AUTO: 0.73 K/UL — SIGNIFICANT CHANGE UP (ref 0–0.9)
MONOCYTES NFR BLD AUTO: 13.3 % — SIGNIFICANT CHANGE UP (ref 2–14)
MONOCYTES NFR BLD AUTO: 2.8 % — SIGNIFICANT CHANGE UP (ref 2–14)
NEUTROPHILS # BLD AUTO: 2.56 K/UL — SIGNIFICANT CHANGE UP (ref 1.8–7.4)
NEUTROPHILS # BLD AUTO: 3.88 K/UL — SIGNIFICANT CHANGE UP (ref 1.8–7.4)
NEUTROPHILS NFR BLD AUTO: 46.9 % — SIGNIFICANT CHANGE UP (ref 43–77)
NEUTROPHILS NFR BLD AUTO: 69.4 % — SIGNIFICANT CHANGE UP (ref 43–77)
NRBC # BLD: 0 /100 WBCS — SIGNIFICANT CHANGE UP
NRBC # FLD: 0 K/UL — SIGNIFICANT CHANGE UP
OB PNL STL: NEGATIVE — SIGNIFICANT CHANGE UP
PCO2 BLDV: 59 MMHG — HIGH (ref 42–55)
PH BLDV: 7.26 — LOW (ref 7.32–7.43)
PHOSPHATE SERPL-MCNC: 2.8 MG/DL — SIGNIFICANT CHANGE UP (ref 2.5–4.5)
PLAT MORPH BLD: ABNORMAL
PLATELET # BLD AUTO: 248 K/UL — SIGNIFICANT CHANGE UP (ref 150–400)
PLATELET # BLD AUTO: 322 K/UL — SIGNIFICANT CHANGE UP (ref 150–400)
PLATELET COUNT - ESTIMATE: NORMAL — SIGNIFICANT CHANGE UP
PO2 BLDV: <20 MMHG — SIGNIFICANT CHANGE UP
POIKILOCYTOSIS BLD QL AUTO: SIGNIFICANT CHANGE UP
POTASSIUM BLDV-SCNC: 3.3 MMOL/L — LOW (ref 3.5–5.1)
POTASSIUM SERPL-MCNC: 3.6 MMOL/L — SIGNIFICANT CHANGE UP (ref 3.5–5.3)
POTASSIUM SERPL-SCNC: 3.6 MMOL/L — SIGNIFICANT CHANGE UP (ref 3.5–5.3)
PROT SERPL-MCNC: 7.1 G/DL — SIGNIFICANT CHANGE UP (ref 6–8.3)
PROTHROM AB SERPL-ACNC: 11.7 SEC — SIGNIFICANT CHANGE UP (ref 10.6–13.6)
RBC # BLD: 3.43 M/UL — LOW (ref 4.2–5.8)
RBC # BLD: 3.96 M/UL — LOW (ref 4.2–5.8)
RBC # FLD: 21.6 % — HIGH (ref 10.3–14.5)
RBC # FLD: 21.7 % — HIGH (ref 10.3–14.5)
RBC BLD AUTO: ABNORMAL
RH IG SCN BLD-IMP: POSITIVE — SIGNIFICANT CHANGE UP
SAO2 % BLDV: 28.1 % — SIGNIFICANT CHANGE UP
SARS-COV-2 RNA SPEC QL NAA+PROBE: SIGNIFICANT CHANGE UP
SCHISTOCYTES BLD QL AUTO: SIGNIFICANT CHANGE UP
SODIUM SERPL-SCNC: 139 MMOL/L — SIGNIFICANT CHANGE UP (ref 135–145)
TARGETS BLD QL SMEAR: SIGNIFICANT CHANGE UP
VARIANT LYMPHS # BLD: 5.6 % — SIGNIFICANT CHANGE UP (ref 0–6)
WBC # BLD: 5.47 K/UL — SIGNIFICANT CHANGE UP (ref 3.8–10.5)
WBC # BLD: 5.59 K/UL — SIGNIFICANT CHANGE UP (ref 3.8–10.5)
WBC # FLD AUTO: 5.47 K/UL — SIGNIFICANT CHANGE UP (ref 3.8–10.5)
WBC # FLD AUTO: 5.59 K/UL — SIGNIFICANT CHANGE UP (ref 3.8–10.5)

## 2021-12-03 PROCEDURE — 99223 1ST HOSP IP/OBS HIGH 75: CPT | Mod: GC

## 2021-12-03 PROCEDURE — 99285 EMERGENCY DEPT VISIT HI MDM: CPT

## 2021-12-03 PROCEDURE — 71045 X-RAY EXAM CHEST 1 VIEW: CPT | Mod: 26

## 2021-12-03 RX ORDER — LANOLIN ALCOHOL/MO/W.PET/CERES
3 CREAM (GRAM) TOPICAL AT BEDTIME
Refills: 0 | Status: DISCONTINUED | OUTPATIENT
Start: 2021-12-03 | End: 2021-12-04

## 2021-12-03 RX ORDER — CALCITRIOL 0.5 UG/1
1 CAPSULE ORAL
Qty: 0 | Refills: 0 | DISCHARGE

## 2021-12-03 RX ORDER — OXYCODONE HYDROCHLORIDE 5 MG/1
1 TABLET ORAL
Qty: 0 | Refills: 0 | DISCHARGE

## 2021-12-03 RX ORDER — FUROSEMIDE 40 MG
1 TABLET ORAL
Qty: 0 | Refills: 0 | DISCHARGE

## 2021-12-03 RX ORDER — NIFEDIPINE 30 MG
1 TABLET, EXTENDED RELEASE 24 HR ORAL
Qty: 0 | Refills: 0 | DISCHARGE

## 2021-12-03 RX ORDER — AMLODIPINE BESYLATE 2.5 MG/1
1 TABLET ORAL
Qty: 0 | Refills: 0 | DISCHARGE

## 2021-12-03 RX ORDER — PANTOPRAZOLE SODIUM 20 MG/1
40 TABLET, DELAYED RELEASE ORAL EVERY 12 HOURS
Refills: 0 | Status: DISCONTINUED | OUTPATIENT
Start: 2021-12-03 | End: 2021-12-04

## 2021-12-03 RX ORDER — PANTOPRAZOLE SODIUM 20 MG/1
80 TABLET, DELAYED RELEASE ORAL ONCE
Refills: 0 | Status: COMPLETED | OUTPATIENT
Start: 2021-12-03 | End: 2021-12-03

## 2021-12-03 RX ORDER — PANTOPRAZOLE SODIUM 20 MG/1
1 TABLET, DELAYED RELEASE ORAL
Qty: 0 | Refills: 0 | DISCHARGE

## 2021-12-03 RX ADMIN — PANTOPRAZOLE SODIUM 40 MILLIGRAM(S): 20 TABLET, DELAYED RELEASE ORAL at 23:56

## 2021-12-03 RX ADMIN — PANTOPRAZOLE SODIUM 80 MILLIGRAM(S): 20 TABLET, DELAYED RELEASE ORAL at 11:17

## 2021-12-03 NOTE — ED PROVIDER NOTE - NSICDXPASTMEDICALHX_GEN_ALL_CORE_FT
PAST MEDICAL HISTORY:  Abnormal stress test     Chronic back pain opioid dependence    Chronic renal disease unknown severity    Colorectal polyp detected on colonoscopy x 6, 3 years ago    GERD (gastroesophageal reflux disease)     Gout     HTN (hypertension)     Hyperlipidemia     Hypertension     Sickle cell trait     Stage 3 chronic kidney disease

## 2021-12-03 NOTE — ED ADULT NURSE REASSESSMENT NOTE - NS ED NURSE REASSESS COMMENT FT1
PRBC transfusion started @1830. Pt educated on risks/benefits of PRBC transfusion. Pt educated on s/s of transfusion reaction. Pt denies hx of transfusion reactions. Consent form in pt chart. Will continue to monitor

## 2021-12-03 NOTE — H&P ADULT - PROBLEM SELECTOR PLAN 1
2/2 to anemia i/s/o blood loss  - GI consulted appreciate recs  - Per GI, can start and discharge patient with ferrous sulfate 325mg for presumed PAULETTE   - Outpatient f/u with Dr. Milo Dangelo   - Consider for endoscopy evaluation (push enteroscopy vs balloon assisted enteroscopy)  - hold prophylaxis and aspirin Patient indicated black stools upon triage  - GI consulted for anemia  - Outpatient f/u with Dr. Milo Dangelo   - Consider for endoscopy evaluation (push enteroscopy vs balloon assisted enteroscopy)  - hold prophylaxis and aspirin Patient indicated black stools upon triage  - GI consulted for worsening anemia  - Outpatient f/u with Dr. Milo Dangelo   - Consider for endoscopy evaluation (push enteroscopy vs balloon assisted enteroscopy)  - hold DVT prophylaxis with Heparin and aspirin h/o melena; upper GIB vs small intestinal bleed with Hgb=6.5 down from 8.2 in September, 2021;  -Empiric Protonix IV  - GI consulted for worsening anemia  - Outpatient f/u with Dr. Milo Dangelo   - Consider for endoscopy evaluation (push enteroscopy vs balloon assisted enteroscopy)  - hold DVT prophylaxis with Heparin and aspirin  -hold all non-essential medication  -bedrest pending transfusion  -NPO

## 2021-12-03 NOTE — H&P ADULT - NSHPLABSRESULTS_GEN_ALL_CORE
LABS: Personally reviewed labs, imaging, and ECG                          6.5    5.47  )-----------( 248      ( 03 Dec 2021 17:26 )             22.8       12-03    139  |  101  |  26<H>  ----------------------------<  80  3.6   |  26  |  2.18<H>    Ca    9.0      03 Dec 2021 11:25  Phos  2.8     12-03  Mg     1.80     12-03    TPro  7.1  /  Alb  4.4  /  TBili  0.4  /  DBili  x   /  AST  21  /  ALT  13  /  AlkPhos  58  12-03       LIVER FUNCTIONS - ( 03 Dec 2021 11:25 )  Alb: 4.4 g/dL / Pro: 7.1 g/dL / ALK PHOS: 58 U/L / ALT: 13 U/L / AST: 21 U/L / GGT: x                        PT/INR - ( 03 Dec 2021 11:25 )   PT: 11.7 sec;   INR: 1.03 ratio         PTT - ( 03 Dec 2021 11:25 )  PTT:30.6 sec    Lactate Trend            CAPILLARY BLOOD GLUCOSE      POCT Blood Glucose.: 106 mg/dL (03 Dec 2021 11:22)            RADIOLOGY & ADDITIONAL TESTS: LABS: Personally reviewed labs, imaging, and ECG                          6.5    5.47  )-----------( 248      ( 03 Dec 2021 17:26 )             22.8       12-03    139  |  101  |  26<H>  ----------------------------<  80  3.6   |  26  |  2.18<H>    Ca    9.0      03 Dec 2021 11:25  Phos  2.8     12-03  Mg     1.80     12-03    TPro  7.1  /  Alb  4.4  /  TBili  0.4  /  DBili  x   /  AST  21  /  ALT  13  /  AlkPhos  58  12-03       LIVER FUNCTIONS - ( 03 Dec 2021 11:25 )  Alb: 4.4 g/dL / Pro: 7.1 g/dL / ALK PHOS: 58 U/L / ALT: 13 U/L / AST: 21 U/L / GGT: x                        PT/INR - ( 03 Dec 2021 11:25 )   PT: 11.7 sec;   INR: 1.03 ratio         PTT - ( 03 Dec 2021 11:25 )  PTT:30.6 sec    Lactate Trend            CAPILLARY BLOOD GLUCOSE      POCT Blood Glucose.: 106 mg/dL (03 Dec 2021 11:22)            RADIOLOGY & ADDITIONAL TESTS:  < from: Xray Chest 1 View- PORTABLE-Urgent (Xray Chest 1 View- PORTABLE-Urgent .) (12.03.21 @ 12:14) >    INTERPRETATION:    The lungs are clear. Heart size is stable with an up lifted apex. Pulmonary vascularity is normal.    No evidence of free intraperitoneal air.    Bones are intact.      COMPARISON:  December 4, 2019      IMPRESSION:  Clear lungs.      < end of copied text > EKG, 12/3/21, NSR, 66bpm, qtc 463, sinus arrhythmia, no acute Tw or ST changes, unchanged compared to 9/2/21 - personally interpreted     CXR, 12/3, clear lungs, no pleural effusions - personally interpreted         Personally reviewed the labs below                        6.5    5.47  )-----------( 248      ( 03 Dec 2021 17:26 )             22.8       12-03    139  |  101  |  26<H>  ----------------------------<  80  3.6   |  26  |  2.18<H>    Ca    9.0      03 Dec 2021 11:25  Phos  2.8     12-03  Mg     1.80     12-03    TPro  7.1  /  Alb  4.4  /  TBili  0.4  /  DBili  x   /  AST  21  /  ALT  13  /  AlkPhos  58  12-03       LIVER FUNCTIONS - ( 03 Dec 2021 11:25 )  Alb: 4.4 g/dL / Pro: 7.1 g/dL / ALK PHOS: 58 U/L / ALT: 13 U/L / AST: 21 U/L / GGT: x           PT/INR - ( 03 Dec 2021 11:25 )   PT: 11.7 sec;   INR: 1.03 ratio      PTT - ( 03 Dec 2021 11:25 )  PTT:30.6 sec    POCT Blood Glucose.: 106 mg/dL (03 Dec 2021 11:22)

## 2021-12-03 NOTE — H&P ADULT - PROBLEM SELECTOR PLAN 3
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

## 2021-12-03 NOTE — ED PROVIDER NOTE - OBJECTIVE STATEMENT
The patient is a 76y Male w/ pmhx of HTN, HLD, GERD, CKD3, chronic back and neck pain w/ opioid dependence p/w generalized weakness and fatigue. Pt was here on 11/13 for similar symptoms, was found to be symptomatically anemia at the time. Pt otherwise feels well, but endorses chronic melena. Denies any HA, palpitations, acute CP, SOB, abd pain, N/V, or diarrhea. He underwent EGD/C-scope within the past month w/ Dr. Vicente Wheeler which showed gastritis and angiodysplasia of duodenum without active bleeding and AVM of ascending colon and hemorrhoids without active bleeding. Pt had capsule endoscopy done with Dr. Carter Finney which showed multiple AVMs in prominal colon, duodenum, and jejunum. Pt is supposed to followup with Dr. Milo Dangelo for balloon enteroscopy. Taking ASA, no other AC. Has hx of colonic polyps and external hemorrhoids. NKDA. The patient is a 76y Male w/ pmhx of HTN, HLD, GERD, CKD3, chronic back and neck pain w/ opioid dependence p/w generalized weakness and fatigue. Pt was here on 11/13 for similar symptoms, was found to be symptomatically anemia at the time. Pt otherwise feels well, but endorses chronic melena. Denies any HA, palpitations, acute CP, SOB, abd pain, N/V, or diarrhea. He underwent EGD/C-scope within the past month w/ Dr. Vicente Wheeler which showed gastritis and angiodysplasia of duodenum without active bleeding and AVM of ascending colon and hemorrhoids without active bleeding. Pt had capsule endoscopy done with Dr. Vicente Wheeler which showed multiple AVMs in prominal colon, duodenum, and jejunum. Pt is supposed to followup with Dr. Carter Finney and Dr. Milo Dangelo for balloon enteroscopy. Taking ASA, no other AC. Has hx of colonic polyps and external hemorrhoids. NKDA.

## 2021-12-03 NOTE — H&P ADULT - NSHPSOCIALHISTORY_GEN_ALL_CORE
Marital Status:  Living Situation:  Occupation:  Tobacco Use:  Alcohol Use:  Drug Use:  Sexual History: Marital Status:   Living Situation: Lives with wife  Occupation: Retired    Tobacco Use: 2-3 cigarretes/day since 17yo  Alcohol Use: Social drinker   Drug Use: Cocaine user but has not used in a month

## 2021-12-03 NOTE — ED PROVIDER NOTE - NS ED ROS FT
GENERAL: +generalized weakness, fatigue  EYES: No change in vision  HEENT: No trouble swallowing or speaking  CARDIAC: No chest pain  PULMONARY: No cough or SOB  GI: +melena; no abdominal pain, nausea, vomiting, diarrhea, or constipation  : No changes in urination  SKIN: No rashes  NEURO: No headache, no numbness  MSK: No joint pain  Otherwise as HPI or negative.

## 2021-12-03 NOTE — H&P ADULT - PROBLEM SELECTOR PLAN 5
Patient takes flomax at home   - Restart flomax   - CTM Sees outside nephrologist   - resume home calcitriol   - Baseline creatinine: 1.5 Today’s creatinine: 2.18  - Trend BUN/Cr.  - Strict I/O  - Avoid nephrotoxic agents. Hold ACE-I in setting of FLORENTIN Sees outside nephrologist; Stable;  - resume home calcitriol   - Baseline creatinine: 1.5 Today’s creatinine: 2.18  - Trend BUN/Cr.  - Strict I/O  - Avoid nephrotoxic agents. Hold ACE-I in setting of FLORENTIN

## 2021-12-03 NOTE — CONSULT NOTE ADULT - ASSESSMENT
Impression:  77 yo M w/ PMHx HTN, HLD, GERD, CKD w/ ongoing occult GI bleed presenting w/ stable anemia    # anemia - likely from ongoing small bowel bleed from known AVMs. Currently HDS, labs close to baseline. Will likely require balloon assisted enteroscopy, already scheduled to see Dr. Milo Dangelo. Discussed with patient either admission to hospital for possible push enteroscopy followed by balloon assisted enteroscopy if necessary (to be done early next week) vs discharge and outpatient follow up with Dr. Milo Dangelo. Patient prefers to be discharged and follow up outpatient, which is reasonable given his stability. Discussed with patient return precautions, including worsening fatigue, shortness of breath, chest pain, or overt bleeding.     Recommendations:  - stable for discharge from GI perspective  - can discharge patient with iron (ferrous sulfate) 325 to be taken every other day for ongoing blood loss and presumed iron deficiency anemia  - plan for outpatient follow up with Dr. Milo Dangelo (will send message to his team to try and expedite appointment)  - if patient is admitted, can consider inpatient endoscopic evaluation (push enteroscopy vs balloon assisted enteroscopy)    D/W Dr. Montgomery    GI will continue to follow.     Lusi Montgomery, PGY5  Gastroenterology/Hepatology Fellow  Available on [employer] Teams  [phone] ([employer] Short Range Pager)  [phone] (Long Range Pager)    After 5pm, please contact the on-call GI fellow. [phone] Impression:  77 yo M w/ PMHx HTN, HLD, GERD, CKD w/ ongoing occult GI bleed presenting w/ stable anemia    # anemia - likely from ongoing small bowel bleed from known AVMs. Currently HDS, labs close to baseline. Will likely require balloon assisted enteroscopy, already scheduled to see Dr. Milo Dangelo. Discussed with patient either admission to hospital for possible push enteroscopy followed by balloon assisted enteroscopy if necessary (to be done early next week) vs discharge and outpatient follow up with Dr. Milo Dangelo. Patient prefers to be discharged and follow up outpatient, which is reasonable given his stability. Discussed with patient return precautions, including worsening fatigue, shortness of breath, chest pain, or overt bleeding.     Recommendations:  - Given patient's preference for outpatient follow up, OK for discharge from GI perspective. Patient should return to hospital if he develops overt bleeding or worsening symptoms, including dyspnea, chest pain or lightheadedness  - can discharge patient with iron (ferrous sulfate) 325 to be taken every other day for ongoing blood loss and presumed iron deficiency anemia; Miralax or colace if needed for constipation  - plan for outpatient follow up with Dr. Milo Dangelo (will send message to his team to try and expedite appointment)  - if patient is admitted, can consider inpatient endoscopic evaluation (push enteroscopy vs balloon assisted enteroscopy)    D/W Dr. Montgomery    GI will continue to follow.     Luis Montgomery, PGY5  Gastroenterology/Hepatology Fellow  Available on [employer] Teams  [phone] ([email or web address] Short Range Pager)  [phone] (Long Range Pager)    After 5pm, please contact the on-call GI fellow. [phone]

## 2021-12-03 NOTE — ED PROVIDER NOTE - CLINICAL SUMMARY MEDICAL DECISION MAKING FREE TEXT BOX
Rafael Monge MD (PGY-2): The patient is a 76y Male w/ pmhx of HTN, HLD, GERD, CKD3, chronic back and neck pain w/ opioid dependence p/w generalized weakness and fatigue. Pt had capsule endoscopy done with Dr. Vicente Wheeler which showed multiple AVMs in prominal colon, duodenum, and jejunum. Pt is supposed to followup with Dr. Carter Finney and Dr. Milo Dangelo for balloon enteroscopy. High suspicion for symptomatic anemia due to GI bleed. Protonix, labs, type and screen, ekg, cxr. Pt will most likely be admitted.

## 2021-12-03 NOTE — ED PROVIDER NOTE - PHYSICAL EXAMINATION
Gen: NAD. A&Ox4. Non-toxic appearing.  HEENT: Normocephalic and atraumatic. PERRL, EOMI, no nasal discharge, mucous membranes moist, no scleral icterus.  CV: Regular rate and rhythm, +S1/S2, no M/R/G. No significant lower extremity edema. Radial and DP pulses present and symmetrical. Capillary refill less than 2 seconds.  Resp: Normal effort and rate. CTAB, no rales, rhonchi, or wheezes.  GI: Abdomen soft, non-distended, non tender to palpation. No masses appreciated. Bowel sounds present.  MSK: No open wounds and no bruising. No CVAT bilaterally.  Neuro: Following commands, speaking in full sentences, moving extremities spontaneously  Psych: Appropriate mood, cooperative  Rectal: Normal tone. External hemorrhoid visualized. No stool in rectum, scant amount of brown liquid seen.

## 2021-12-03 NOTE — ED ADULT NURSE NOTE - OBJECTIVE STATEMENT
Pt received to spot 13 presents with fatigue. Pt reports hx of blood transfusions, states " I was supposed to go to [facility] for repeat blood work but was not feeling well so came here". Pt reports recently having endoscopy performed, endorses to having "black stools". Pt a&ox3, ambulatory at baseline, skin intact, respirations even and unlabored, abd soft and non-distended, non-tender to palpation. Pt denies CP, SOB, dizziness, fever, or any other symptoms. Resident at bedside assessing pt, will continue to monitor.

## 2021-12-03 NOTE — H&P ADULT - NSICDXFAMILYHX_GEN_ALL_CORE_FT
FAMILY HISTORY:  Father  Still living? No  FH: colon cancer, Age at diagnosis: Age Unknown    Sibling  Still living? No  FH: colon cancer, Age at diagnosis: Age Unknown     FAMILY HISTORY:  Father  Still living? No  FH: colon cancer, Age at diagnosis: Age Unknown    Mother  Still living? Unknown  FH: hypertension, Age at diagnosis: Age Unknown  FH: type 2 diabetes, Age at diagnosis: Age Unknown    Sibling  Still living? No  FH: colon cancer, Age at diagnosis: Age Unknown

## 2021-12-03 NOTE — ED ADULT TRIAGE NOTE - CHIEF COMPLAINT QUOTE
c/o fatigue since last night. Pt has been having low Hbg with blood transfusions. Pt intended to go to [facility] for repeat blood work but b/c of increased fatigue and generalized weakness pt wants to be evaluated. Pt had capsule endoscopy done recently. Was told there is bleeding somewhere intestines. Stools are still black. Denies SOB. Ambulatory to triage.

## 2021-12-03 NOTE — H&P ADULT - HISTORY OF PRESENT ILLNESS
76M with h/o HTN, GERD, CKD3, chronic back and neck pain, cocaine use presents with several weeks of generalized weakness and fatigue. He initially presented to the ED on [facility] (whom he sees for CKD3) who discovered that he had a Hb of 6.2. He was given 2U blood and referred to outpatient GI where he received a EGD/colonoscopy (previous colonoscopy 3 years ago - 15 polyps).     EGD/Colonoscopy notable for hemorrhoids and single AVM in ascending colon. The patient continued to experience symptoms of fatigue and weakness and arrived back at the hospital 1 month ago. He was found to have a Hb 7.1. He was transfused 1U PRBC and scheduled for outpatient capsule endoscopy. Capsule endoscopy (11/16/21) demonstrated angioectasias throughout small intestine (1% of small bowel - 32% of small bowel), with one concerning for active bleeding (32% of small bowel). Patient was referred to Dr. Milo Dangelo or Dr. Carter Finney for balloon assisted enteroscopy. Was supposed to see Dr. Dangelo on 12/21 for evaluation.     Over the past couple of days, the patient felt that he was not able to make his appointment because he was experiencing more generalized weakness and decided to admit himself to the hospital. Initial Hb was 7.4 but repeat was 6.5. Patient was admitted due to anemia and Hb loss.     ED course:  76M with h/o HTN, GERD, CKD3, chronic back and neck pain, cocaine use presents with several weeks of generalized weakness and fatigue. He initially presented to the ED on 9/2 at [facility] (whom he sees for CKD3) who discovered that he had a Hb of 6.2. He was given 2U blood and referred to outpatient GI where he received a EGD/colonoscopy (previous colonoscopy 3 years ago - 15 polyps).     EGD/Colonoscopy notable for hemorrhoids and single AVM in ascending colon. The patient continued to experience symptoms of fatigue and weakness and arrived back at the hospital 1 month ago. He was found to have a Hb 7.1. He was transfused 1U PRBC and scheduled for outpatient capsule endoscopy. Capsule endoscopy (11/16/21) demonstrated angioectasias throughout small intestine (1% of small bowel - 32% of small bowel), with one concerning for active bleeding (32% of small bowel). Patient was referred to Dr. Milo Dangelo or Dr. Carter Finney for balloon assisted enteroscopy. Was supposed to see Dr. Dangelo on 12/21 for evaluation.     Over the past couple of days, the patient felt that he was not able to make his appointment because he was experiencing more generalized weakness and decided to admit himself to the hospital. Initial Hb was 7.4 but repeat was 6.5. Patient was admitted due to anemia and Hb loss.     Patient denies headache, CP, SOB, nausea and vomiting. Says that he has a chronic abdominal discomfort that he says is mostly gaseous buildup.      ED course: VS Afebrile HR 63-73 -138/53-88 RR 16 SpO2 100% RA. CXR Clear lungs    76M with h/o HTN, GERD, CKD3, chronic back and neck pain, cocaine use presents with several weeks of generalized weakness and fatigue. He initially presented to the ED on 9/2 at [facility] (whom he sees for CKD3) who discovered that he had a Hb of 6.2. He was given 2U blood and referred to outpatient GI where he received a EGD/colonoscopy (previous colonoscopy 3 years ago - 15 polyps).     EGD/Colonoscopy notable for hemorrhoids and single AVM in ascending colon. The patient continued to experience symptoms of fatigue and weakness and arrived back at the hospital 1 month ago. He was found to have a Hb 7.1. He was transfused 1U PRBC and scheduled for outpatient capsule endoscopy. Capsule endoscopy (11/16/21) demonstrated angioectasias throughout small intestine (1% of small bowel - 32% of small bowel), with one concerning for active bleeding (32% of small bowel). Patient was referred to Dr. Milo Dangelo or Dr. Carter Finney for balloon assisted enteroscopy. Was supposed to see Dr. Dangelo on 12/21 for evaluation.     Over the past couple of days, the patient felt that he was not able to make his appointment because he was experiencing more generalized weakness and decided to admit himself to the hospital. Initial Hb was 7.4 but repeat was 6.5. Patient was admitted due to anemia and Hb loss.     Patient denies headache, CP, SOB, nausea and vomiting. Says that he has a chronic abdominal discomfort that he says is mostly gaseous buildup.      ED course: VS Afebrile HR 63-73 -138/53-88 RR 16 SpO2 100% RA. CXR Clear lungs S/p 1U PRBC    75yo Male with h/o HTN, GERD, CKD3, chronic back and neck pain, h/o cocaine use presents with several weeks of generalized weakness and fatigue along with having "black stools". Reports no associated CP, LOC,  SOB or hematemesis. States that weakness has been progressively worse. Over the past couple of days, the patient felt that he was not able to make his appointment because he was experiencing so much more generalized weakness with lightheadedness, and decided to admit himself to the hospital. He initially presented to the ED on 9/2 at [facility] (whom he sees for CKD3) who discovered that he had a Hb of 6.2. He was given 2U blood and referred to outpatient GI where he received a EGD/colonoscopy (previous colonoscopy 3 years ago - 15 polyps).   EGD/Colonoscopy notable for hemorrhoids and single AVM in ascending colon. The patient continued to experience symptoms of fatigue and weakness and arrived back at the hospital 1 month ago. He was found to have a Hb 7.1. He was transfused 1U PRBC and scheduled for outpatient capsule endoscopy. Capsule endoscopy (11/16/21) demonstrated angioectasias throughout small intestine (1% of small bowel - 32% of small bowel), with one concerning for active bleeding (32% of small bowel). Patient was referred to Dr. Milo Dangelo or Dr. Carter Finney for balloon assisted enteroscopy. Was supposed to see Dr. Dangelo on 12/21 for evaluation.  Says that he has a chronic abdominal discomfort that he says is mostly gaseous buildup.      ED course: VS Afebrile HR 63-73 -138/53-88 RR 16 SpO2 100% RA. CXR Clear lungs S/p 1U PRBC

## 2021-12-03 NOTE — ED ADULT NURSE NOTE - BEFAST FACE
Podiatry Surgery Progress Note      Patient: Anshul De La Cruz MRN: [id number]  SSN: [id number]    YOB: 1936  Age: 80 y.o. Sex: female      Assessment:     Patient Active Problem List   Diagnosis Code    CVA, old, hemiparesis ([facility] I69.359    Type 2 diabetes mellitus ([facility] E11.9    Abnormal finding on thyroid function test R94.6    Acute on chronic renal insufficiency N28.9, N18.9    Anemia D64.9    Chronic osteomyelitis of ankle ([facility]) M86.679    Injury of foot S99.929A    Leukocytosis D72.829    Spinal stenosis of lumbar region M48.061    Microscopic hematuria R31.29    Rhabdomyolysis M62.82    Vitamin D deficiency E55.9    Stroke ([facility]) I63.9    Hypertension I10    DM (diabetes mellitus) ([facility]) E11.9    Normocytic anemia D64.9    CKD (chronic kidney disease) stage 3, GFR 30-59 ml/min N18.3    Elevated TSH R94.6    Iron (Fe) deficiency anemia D50.9    UTI (urinary tract infection) N39.0    Encephalopathy G93.40    LAURA (acute kidney injury) ([facility]) N17.9    Skin ulcer of left ankle, with fat layer exposed ([facility] L97.322    Venous ulcer of ankle, left ([facility]) I83.023    Weak pulse R09.89    Syncope R55    Vascular disease, peripheral ([facility]) I73.9    Non-pressure chronic ulcer of right ankle with fat layer exposed ([facility] L97.312    Type 2 diabetes mellitus with nephropathy ([facility]) E11.21    Type 2 diabetes mellitus with diabetic neuropathy ([facility]) E11.40    Hypertensive left ventricular hypertrophy, without heart failure I11.9    Atrial arrhythmia I49.8          Plan:   Continue LWC with [employer] and use rolled gauze Q 48 hrs. Keep appointment with vascular as already scheduled. Total time spent with patient: 30 [facility] discussed with: Patient, Family and Nursing Staff    Discussed:  Care Plan    Disposition:  Stable      Ms. Petar Ridley is a 80 y.o. female who was admitted for chronic ulcers with a vascular component.  Patient states she did have vascular surgery since her last visit and changing her dressings herself and the ulcers are almost healed. Subjective:   Past Medical History  Past Medical History:   Diagnosis Date    CAD (coronary artery disease)     Chronic osteomyelitis of ankle (HCC)     CKD (chronic kidney disease) stage 3, GFR 30-59 ml/min 06/24/2013    Worst noted (08/06/13) BUN/sCr: 54/2.49, GFR 20.     DM [facility]diabetes mellitus) [facility]     Elevated TSH 06/25/2012    5.08     Hypertension     Leukocytosis     Lumbar spinal stenosis     Normocytic anemia     Rhabdomyolysis     Splenomegaly     Stroke ([facility])     Vitamin D deficiency      Social History     Social History    Marital status:      Spouse name: N/A    Number of children: N/A    Years of education: N/A     Occupational History    Not on file. Social History Main Topics    Smoking status: Never Smoker    Smokeless tobacco: Never Used    Alcohol use No    Drug use: No    Sexual activity: No     Other Topics Concern    Not on file     Social History Narrative       Current Medications  Current Outpatient Prescriptions   Medication Sig Dispense Refill    IBUPROFEN PO Take  by mouth.  metoprolol tartrate (LOPRESSOR) 50 mg tablet Take 1 Tab by mouth two (2) times a day. 60 Tab 0    gabapentin (NEURONTIN) 300 mg capsule Take 1 Cap by mouth three (3) times daily. 12/22/16, QTY#90, 30 Days, 2 Refills. Dr. Viviane Richards  2    cholecalciferol (VITAMIN D3) 1,000 unit tablet Take 3,000 Units by mouth daily.  lisinopril-hydrochlorothiazide (PRINZIDE, ZESTORETIC) 20-25 mg per tablet Take 1 Tab by mouth daily.          Patient Allergies  No Known Allergies       Objective:   General Exam  alert, cooperative, no distress, appears stated age    Vitals  Visit Vitals    /61    Pulse 63    Temp 97 °F (36.1 °C)    Resp 18    SpO2 97%       REVIEW OF SYSTEMS:  General: denies chronic fatigue, weight loss, fever, anemia, bruising, depression, nervousness, panic attacks  HEENT: denies ringing in ears, ear infections, dizzy spells, poor vision, glaucoma, sinus trouble, hoarseness, eye infections  GI: denies diarrhea, gas, bloating, heartburn, regurgitation, difficulty swallowing, painful swallowing, nausea, vomiting, constipation, abdominal pain, decreased appetite, blood in stools, black stools, jaundice, dark urine  Lungs: denies pneumonia, asthma, cough, SOB, hemoptysis  Heart: denies chest pain, irregular heart beat, ankle swelling, HTN  Skin: denies rashes, hives, allergic reaction  Urinary: denies UTI, kidney stones, decreased urine force and flow, urination at night, blood in urine, painful urination  Bones and Joints: denies arthritis, rheumatism, back pain, gout, osteoporosis  Neurologic: denies stroke, seizures, headaches, numbness, tingling    Foot Exam  VASCULAR EXAM:. Pedal pulses are trace palpable 1/4 DP and non palpable 0/4 PT right and left foot. Skin temperature is warm to warm right and left foot. Digital capillary fill time is 5 seconds right and left foot. There is decreased edema of the right and left foot and ankle.       NEUROLOGICAL EXAM:. Sensation Intact with 5.07g monofilament wire right and left foot.  Deep tendon reflexes intact and symmetrical on the right and left foot.      MUSCULOSKELETAL EXAM:. Muscle tone is normal.  Muscle strength of the flexor and extensor group inversion and eversion Bilateral. 4/5.         DERMATOLOGICAL EXAM:. Skin is of abnormal texture and turgor with atrophic skin changes noting hair growth, nail changes (thickening), Bilateral. There is a ulcer full thickness noted over the lateral and medial lower leg, left with improved measurements as stated below with minmal erythema, left      Wound Ankle Left (Active)   Number of days:1681       Wound Ankle Left (Active)   Number of days:1681       Wound Ankle Left (Active)   Number of days:1681       Wound Ankle Left;Lateral (Active)   Number of days:382 Wound Ankle Right (Active)   DRESSING STATUS Removed 12/21/2017  9:42 AM   DRESSING TYPE Other (Comment) 12/21/2017  9:42 AM   Non-Pressure Injury Partial thickness (epider/derm) 12/21/2017  9:42 AM   Wound Length (cm) 0.7 cm 12/21/2017  9:42 AM   Wound Width (cm) 0.8 cm 12/21/2017  9:42 AM   Wound Depth (cm) 0.1 12/21/2017  9:42 AM   Wound Surface area (cm^2) 0.06 cm^2 12/21/2017  9:42 AM   Condition of Base [facility] 12/21/2017  9:42 AM   Condition of Edges Open 12/21/2017  9:42 AM   Tissue Type Yellow 12/21/2017  9:42 AM   Tissue Type Percent Red 50 11/16/2017 10:08 AM   Tissue Type Percent Yellow 100 12/21/2017  9:42 AM   Drainage Amount  Moderate 12/21/2017  9:42 AM   Drainage Color Serous 12/21/2017  9:42 AM   Wound Odor None 12/21/2017  9:42 AM   Periwound Skin Condition Erythema, blanchable 12/21/2017  9:42 AM   Cleansing and Cleansing Agents  Dermal wound cleanser; Soap and water 12/21/2017  9:42 AM   Dressing Type Applied Other (Comment) 12/21/2017  9:42 AM   Procedure Tolerated Well 12/21/2017  9:42 AM   Number of days:382       Wound Ankle Lateral;Right (Active)   Number of days:127       Wound Ankle Left;Lateral (Active)   Number of days:127       Wound Ankle Left;Medial;Other (comment) (Active)   DRESSING STATUS Removed 12/21/2017  9:42 AM   DRESSING TYPE Other (Comment) 12/21/2017  9:42 AM   Non-Pressure Injury Partial thickness (epider/derm) 12/21/2017  9:42 AM   Wound Length (cm) 0.5 cm 12/21/2017  9:42 AM   Wound Width (cm) 0.1 cm 12/21/2017  9:42 AM   Wound Depth (cm) 0.1 12/21/2017  9:42 AM   Wound Surface area (cm^2) 0 cm^2 12/21/2017  9:42 AM   Condition of Base [place] 12/21/2017  9:42 AM   Condition of Edges Open 11/16/2017 10:08 AM   Tissue Type Pink 12/21/2017  9:42 AM   Tissue Type Percent Pink 100 12/21/2017  9:42 AM   Tissue Type Percent Red 50 11/16/2017 10:08 AM   Tissue Type Percent Yellow 50 11/16/2017 10:08 AM   Drainage Amount  Moderate 12/21/2017  9:42 AM   Drainage Color Serosanguinous 12/21/2017  9:42 AM   Wound Odor None 12/21/2017  9:42 AM   Periwound Skin Condition Erythema, blanchable 12/21/2017  9:42 AM   Cleansing and Cleansing Agents  Dermal wound cleanser; Soap and water 12/21/2017  9:42 AM   Dressing Type Applied Other (Comment) 12/21/2017  9:42 AM   Procedure Tolerated Well 12/21/2017  9:42 AM   Number of days:127       Wound Ankle Left;Medial;Distal (Active)   DRESSING STATUS Removed 3/15/2018  2:01 PM   DRESSING TYPE Other (Comment) 3/15/2018  2:01 PM   Non-Pressure Injury Partial thickness (epider/derm) 12/7/2017  9:56 AM   Wound Length (cm) 0.1 cm 3/15/2018  2:01 PM   Wound Width (cm) 0.1 cm 3/15/2018  2:01 PM   Wound Depth (cm) 0.1 3/15/2018  2:01 PM   Wound Surface area (cm^2) 0.01 cm^2 3/15/2018  2:01 PM   Condition of Base Pink;Slough 2/15/2018 11:36 AM   Condition of Edges Closed; Open 3/15/2018  2:01 PM   Epithelialization (%) 100 3/15/2018  2:01 PM   Tissue Type Pink 3/15/2018  2:01 PM   Tissue Type Percent Pink 100 3/15/2018  2:01 PM   Tissue Type Percent Yellow 60 2/15/2018 11:36 AM   Tissue Type Percent Other (comment) 40 2/15/2018 10:52 AM   Drainage Amount  Scant 3/15/2018  2:01 PM   Drainage Color Serous 3/15/2018  2:01 PM   Wound Odor None 3/15/2018  2:01 PM   Periwound Skin Condition Edematous; Erythema, blanchable 3/15/2018  2:01 PM   Cleansing and Cleansing Agents  Dermal wound cleanser 3/15/2018  2:01 PM   Dressing Type Applied Other (Comment) 2/15/2018 11:36 AM   Procedure Tolerated Well 12/21/2017  9:42 AM   Number of days:119       Wound Ankle Left;Medial;Proximal (Active)   DRESSING STATUS Removed 3/15/2018  2:01 PM   DRESSING TYPE Other (Comment) 3/15/2018  2:01 PM   Non-Pressure Injury Partial thickness (epider/derm) 2/15/2018 10:52 AM   Wound Length (cm) 0.5 cm 3/15/2018  2:01 PM   Wound Width (cm) 0.2 cm 3/15/2018  2:01 PM   Wound Depth (cm) 0.1 3/15/2018  2:01 PM   Wound Surface area (cm^2) 0.1 cm^2 3/15/2018  2:01 PM   Condition of Base [place] 3/15/2018  2:01 PM   Condition of Edges Open 3/15/2018  2:01 PM   Tissue Type Pink 3/15/2018  2:01 PM   Tissue Type Percent Pink 100 3/15/2018  2:01 PM   Tissue Type Percent Red 5 11/16/2017 10:08 AM   Tissue Type Percent Yellow 50 2/15/2018 11:36 AM   Drainage Amount  Scant 3/15/2018  2:01 PM   Drainage Color Serous 3/15/2018  2:01 PM   Wound Odor None 3/15/2018  2:01 PM   Periwound Skin Condition Edematous; Erythema, blanchable 3/15/2018  2:01 PM   Cleansing and Cleansing Agents  Dermal wound cleanser 3/15/2018  2:01 PM   Dressing Type Applied Other (Comment) 2/15/2018 11:36 AM   Procedure Tolerated Well 2/1/2018 12:56 PM   Number of days:119       Wound Toe Left (Active)   DRESSING TYPE Open to air 12/7/2017  9:56 AM   Non-Pressure Injury Partial thickness (epider/derm) 11/16/2017 10:08 AM   Wound Length (cm) 0.6 cm 11/16/2017 10:08 AM   Wound Width (cm) 1.4 cm 11/16/2017 10:08 AM   Wound Depth (cm) 0.1 11/16/2017 10:08 AM   Wound Surface area (cm^2) 0.08 cm^2 11/16/2017 10:08 AM   Condition of Base Other (comment) 11/16/2017 10:08 AM   Tissue Type Yellow 11/16/2017 10:08 AM   Tissue Type Percent Yellow 100 11/16/2017 10:08 AM   Drainage Amount  None 11/16/2017 10:08 AM   Wound Odor None 11/16/2017 10:08 AM   Cleansing and Cleansing Agents  Dermal wound cleanser 11/16/2017 10:08 AM   Dressing Type Applied Open to air 11/16/2017 10:08 AM   Number of days:119       Wound Toe Left (Active)   DRESSING STATUS Removed 11/16/2017 10:08 AM   DRESSING TYPE Open to air 12/7/2017  9:56 AM   Non-Pressure Injury Partial thickness (epider/derm) 11/16/2017 10:08 AM   Wound Length (cm) 0.3 cm 11/16/2017 10:08 AM   Wound Width (cm) 0.4 cm 11/16/2017 10:08 AM   Wound Depth (cm) 0.1 11/16/2017 10:08 AM   Wound Surface area (cm^2) 0.01 cm^2 11/16/2017 10:08 AM   Condition of Base Other (comment) 11/16/2017 10:08 AM   Tissue Type Yellow 11/16/2017 10:08 AM   Tissue Type Percent Yellow 100 11/16/2017 10:08 AM   Drainage Amount  None 11/16/2017 10:08 AM   Wound Odor None 11/16/2017 10:08 AM   Periwound Skin Condition Intact 11/16/2017 10:08 AM   Dressing Type Applied Open to air 11/16/2017 10:08 AM   Number of days:119       Wound Ankle Left;Lateral (Active)   DRESSING STATUS Removed 12/21/2017  9:42 AM   DRESSING TYPE Other (Comment) 12/21/2017  9:42 AM   Non-Pressure Injury Partial thickness (epider/derm) 12/21/2017  9:42 AM   Wound Length (cm) 1.1 cm 12/21/2017  9:42 AM   Wound Width (cm) 0.9 cm 12/21/2017  9:42 AM   Wound Depth (cm) 0.1 12/21/2017  9:42 AM   Wound Surface area (cm^2) 0.1 cm^2 12/21/2017  9:42 AM   Condition of Base [place];Slough 12/21/2017  9:42 AM   Condition of Edges Open 12/21/2017  9:42 AM   Tissue Type Pink;Yellow 12/21/2017  9:42 AM   Tissue Type Percent Pink 10 11/16/2017 10:08 AM   Tissue Type Percent Red 50 12/21/2017  9:42 AM   Tissue Type Percent Yellow 50 12/21/2017  9:42 AM   Drainage Amount  Moderate 12/21/2017  9:42 AM   Drainage Color Serous 12/21/2017  9:42 AM   Wound Odor None 12/21/2017  9:42 AM   Periwound Skin Condition Erythema, blanchable;Edematous 12/21/2017  9:42 AM   Cleansing and Cleansing Agents  Dermal wound cleanser; Soap and water 12/21/2017  9:42 AM   Dressing Type Applied Other (Comment) 12/21/2017  9:42 AM   Procedure Tolerated Well 12/21/2017  9:42 AM   Number of days:119       Wound Foot Left;Medial (Active)   DRESSING TYPE Other (Comment) 3/15/2018  2:01 PM   Non-Pressure Injury Partial thickness (epider/derm) 2/15/2018 10:52 AM   Wound Length (cm) 1 cm 3/15/2018  2:01 PM   Wound Width (cm) 0.5 cm 3/15/2018  2:01 PM   Wound Depth (cm) 0.2 3/15/2018  2:01 PM   Wound Surface area (cm^2) 0.5 cm^2 3/15/2018  2:01 PM   Condition of Base [place];Slough 3/15/2018  2:01 PM   Condition of Edges Open 3/15/2018  2:01 PM   Tissue Type Pink 2/15/2018 10:52 AM   Tissue Type Percent Pink 80 3/15/2018  2:01 PM   Tissue Type Percent Yellow 20 3/15/2018  2:01 PM   Drainage Amount  Scant 3/15/2018  2:01 PM Drainage Color Serous 3/15/2018  2:01 PM   Wound Odor None 3/15/2018  2:01 PM   Periwound Skin Condition Erythema, blanchable;Edematous 3/15/2018  2:01 PM   Cleansing and Cleansing Agents  Dermal wound cleanser 3/15/2018  2:01 PM   Dressing Type Applied Other (Comment) 2/15/2018 11:36 AM   Procedure Tolerated Well 2/1/2018  1:05 PM   Number of days:101       Wound Leg Lower Right; Anterior;Medial (Active)   Number of days:101       [REMOVED] Wound Leg Lower Right; Anterior; Lateral (Removed)   Removed 02/15/18 1138   DRESSING TYPE Open to air 2/15/2018 11:36 AM   Non-Pressure Injury Partial thickness (epider/derm) 2/1/2018  1:05 PM   Wound Length (cm) 0.9 cm 2/1/2018  1:05 PM   Wound Width (cm) 0.6 cm 2/1/2018  1:05 PM   Wound Depth (cm) 0.1 2/1/2018  1:05 PM   Wound Surface area (cm^2) 0.54 cm^2 2/1/2018  1:05 PM   Condition of Base [place] 2/1/2018  1:05 PM   Condition of Edges Open 2/1/2018  1:05 PM   Tissue Type Red 2/1/2018  1:05 PM   Tissue Type Percent Red 100 2/1/2018  1:05 PM   Drainage Amount  Scant 2/1/2018  1:05 PM   Drainage Color Serosanguinous 2/1/2018  1:05 PM   Wound Odor None 2/1/2018  1:05 PM   Periwound Skin Condition Edematous; Erythema, blanchable 2/1/2018  1:05 PM   Cleansing and Cleansing Agents  Dermal wound cleanser 2/1/2018  1:05 PM   Dressing Type Applied Other (Comment) 2/1/2018  1:05 PM   Procedure Tolerated Well 2/1/2018  1:05 PM   Number of days:73        Labs  No results found for this or any previous visit (from the past 24 hour(s)).     X-Ray:  deferred    Procedures:   Vascular intervention               LIU Hollis, PAKO  March 15, 2018 No
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
## 2021-12-03 NOTE — H&P ADULT - PROBLEM SELECTOR PLAN 4
Sees outside nephrologist   - resume home calcitriol   - Baseline creatinine: 1.5 Today’s creatinine: 2.18  - Trend BUN/Cr.  - Strict I/O  - Avoid nephrotoxic agents. Hold ACE-I in setting of FLORENTIN Patient takes carvedilol, nifedipine, and lisinopril at home  - Restart carvedilol with hold parameters  - CTM Patient takes carvedilol, nifedipine, and lisinopril at home  - Restart carvedilol with hold parameters  - CTM  -hold for now both nifedipine and lisinopril  due to symptomatic anemia

## 2021-12-03 NOTE — H&P ADULT - PROBLEM SELECTOR PLAN 6
DVT Ppx: IMPROVE 1, Lovenox   Diet: Regular   Dispo: Home Patient takes pantoprazole as a home medication   - Currently endorses gassy chest discomfort   - resume home med   - CTM Patient takes flomax at home   - Restart flomax   - CTM

## 2021-12-03 NOTE — ED PROVIDER NOTE - ATTENDING CONTRIBUTION TO CARE
agree with resident note    "The patient is a 76y Male w/ pmhx of HTN, HLD, GERD, CKD3, chronic back and neck pain w/ opioid dependence p/w generalized weakness and fatigue. Pt was here on 11/13 for similar symptoms, was found to be symptomatically anemia at the time. Pt otherwise feels well, but endorses chronic melena. Denies any HA, palpitations, acute CP, SOB, abd pain, N/V, or diarrhea. He underwent EGD/C-scope within the past month w/ Dr. Vicente Wheeler which showed gastritis and angiodysplasia of duodenum without active bleeding and AVM of ascending colon and hemorrhoids without active bleeding. Pt had capsule endoscopy done with Dr. Carter Finney which showed multiple AVMs in prominal colon, duodenum, and jejunum. Pt is supposed to followup with Dr. Milo Dangelo for balloon enteroscopy. Taking ASA, no other AC. Has hx of colonic polyps and external hemorrhoids. NKDA."    Pt states was supposed to get labs for routine nephrology followup but felt weak and at this point didn't feel he could wait to see Dr. Dangelo on the 21st.    PE: well appearing; pallor; CTAB/L; s1 s2 no m/r/g abd soft/NT/ND rectal: no stool in vault; ext: no edema    Imp: GI bleed now with symptoms; likely will need to be transfused and seen by GI for balloon enteroscopy as inpatient; will reach out to GI fellow/Dr. Finney

## 2021-12-03 NOTE — H&P ADULT - ATTENDING COMMENTS
d 77yo Male with h/o HTN, GERD, CKD3, chronic back and neck pain, h/o cocaine use a/w generalized weakness due to symptomatic blood loss anemia 2/2 upper GIB vs small intestinal bleed; 77yo Male with h/o HTN, GERD, CKD3, chronic back and neck pain, h/o cocaine use a/w generalized weakness due to symptomatic blood loss anemia 2/2 upper GIB vs small intestinal bleed;    The above assessment and plan were supplemented and modified by attending where indicated;

## 2021-12-03 NOTE — H&P ADULT - PROBLEM SELECTOR PLAN 7
DVT Ppx: home in light of possible GI bleed   Diet: Regular   Dispo: Home DVT Ppx: hold in light of possible GI bleed   Diet: Regular   Dispo: Home Patient takes pantoprazole as a home medication   - Currently endorses gassy chest discomfort   - resume pantoprazole 40mg IVP BID   - CTM

## 2021-12-03 NOTE — H&P ADULT - PROBLEM SELECTOR PLAN 2
Patient has received 3U PRBC for various anemic episodes with appropriate response   - s/p 1U PRBC in ED   - check B12, folate, iron studies   - f/u post transfusion CBC   - CBC AM   - CTM

## 2021-12-03 NOTE — CONSULT NOTE ADULT - ATTENDING COMMENTS
Presented with anemia. Hgb in 7s. Outside records reviewed in [employer]- recent EGD/colonoscopy and VCE with multiple ectasias, including likely bleeding ectasia at 32% TSBT, as outlined above.   Patient offered push enteroscopy +/- single balloon enteroscopy next week, but is eager to go home.   Has appointment to follow up with advanced endoscopist, Dr. Milo Dangelo. GI team to reach out to Dr. Dangelo to request expedited appointment as requested by patient.

## 2021-12-03 NOTE — H&P ADULT - NSHPPHYSICALEXAM_GEN_ALL_CORE
Vital Signs Last 24 Hrs  T(C): 37.1 (03 Dec 2021 18:25), Max: 37.1 (03 Dec 2021 18:25)  T(F): 98.7 (03 Dec 2021 18:25), Max: 98.7 (03 Dec 2021 18:25)  HR: 66 (03 Dec 2021 18:25) (63 - 73)  BP: 138/88 (03 Dec 2021 18:25) (106/53 - 138/88)  BP(mean): --  RR: 16 (03 Dec 2021 18:25) (16 - 16)  SpO2: 100% (03 Dec 2021 18:25) (100% - 100%)    CONSTITUTIONAL: Well-groomed, in no apparent distress  EYES: No conjunctival or scleral injection, non-icteric; PERRLA and symmetric  ENMT: No external nasal lesions; nasal mucosa not inflamed; normal dentition; no pharyngeal injection or exudates, oral mucosa with moist membranes  NECK: Trachea midline without palpable neck mass; thyroid not enlarged and non-tender  RESPIRATORY: Breathing comfortably; no dullness to percussion; lungs CTA without wheeze/rhonchi/rales  CARDIOVASCULAR: +S1S2, RRR, no M/G/R; no carotid bruits; pedal pulses full and symmetric; no lower extremity edema  CHEST/BREAST: Breasts are symmetric in appearance; no palpable masses or lumps  GASTROINTESTINAL: No palpable masses or tenderness, +BS throughout, no rebound/guarding; no hepatosplenomegaly; no hernia palpated  GENITOURINARY:       MALE: Normal appearing external genitalia, no penile lesion; no palpable testicular or scrotal mass; prostate not enlarged and without palpable nodule       FEMALE: Normal appearing external genitalia, no vaginal discharge or lesion noted; examination of urethra WNL; bladder without fullness or tenderness on palpation; palpation of uterus and adnexa without tenderness or mass  LYMPHATIC: No cervical LAD or tenderness; no axillary LAD or tenderness; no inguinal LAD or tenderness  MUSCULOSKELETAL: Normal gait and station; no digital clubbing or cyanosis; no paraspinal tenderness; examination of the  (head/neck, spine/ribs/pelvis, RUE, LUE, RLE, LLE) without misalignment, normal strength and tone of extremities  SKIN: No rashes or ulcers noted; no subcutaneous nodules or induration palpable  NEUROLOGIC: CN II-XII intact; normal reflexes in upper and lower extremities; sensation intact in LEs b/l to light touch  PSYCHIATRIC: A+O x 3; mood and affect appropriate; appropriate insight and judgment Vital Signs Last 24 Hrs  T(C): 37.1 (03 Dec 2021 18:25), Max: 37.1 (03 Dec 2021 18:25)  T(F): 98.7 (03 Dec 2021 18:25), Max: 98.7 (03 Dec 2021 18:25)  HR: 66 (03 Dec 2021 18:25) (63 - 73)  BP: 138/88 (03 Dec 2021 18:25) (106/53 - 138/88)  BP(mean): --  RR: 16 (03 Dec 2021 18:25) (16 - 16)  SpO2: 100% (03 Dec 2021 18:25) (100% - 100%)    CONSTITUTIONAL: Well-groomed, in no apparent distress  EYES: No conjunctival or scleral injection, PERRLA and symmetric  ENMT: No external nasal lesions; nasal mucosa not inflamed; normal dentition; no pharyngeal injection or exudates, oral mucosa with moist membranes  NECK: Trachea midline without palpable neck mass; thyroid not enlarged and non-tender  RESPIRATORY: Breathing comfortably; no dullness to percussion; lungs CTA without wheeze/rhonchi/rales  CARDIOVASCULAR: +S1S2, RRR, no M/G/R; no carotid bruits; no lower extremity edema, some substernal discomfort that is chronic and gaseous   GASTROINTESTINAL: No palpable masses or tenderness, +BS throughout, no rebound/guarding; no hernia palpated  LYMPHATIC: No cervical LAD or tenderness; no axillary LAD or tenderness;   MUSCULOSKELETAL: Gait deferred; normal strength and tone of extremities  SKIN: No rashes or ulcers noted; no subcutaneous nodules or induration palpable  NEUROLOGIC: CN II-XII intact; sensation intact in LEs b/l to light touch  PSYCHIATRIC: A+O x 3; mood and affect appropriate; appropriate insight and judgment

## 2021-12-03 NOTE — H&P ADULT - NSHPREVIEWOFSYSTEMS_GEN_ALL_CORE
General: No fevers, chills, fatigue, weight loss  HEENT: No headaches, acute visual changes, rhinorrhea, sore throat, dysphagia  CVS: No chest pain, palpitations  Resp: No cough, dyspnea, wheezing  GI: No abdominal pain, nausea, vomiting, constipation, diarrhea, hematochezia, melena  : No dysuria, frequency, hematuria, or discharge  MSK: No joint pain or swelling  Ext: No edema, no claudication  Skin: No rashes or itching  Heme: No easy bruising or petechiae  Neuro: No confusion, numbness, focal weakness, or loss of consciousness  Endocrine: No excessive heat or cold symptoms, polyuria, polydipsia General: +fatigue + weight loss  HEENT: + headache +light headedness   CVS: Subcostal chest discomfort  Resp: No cough, dyspnea, wheezing  GI: +diarrhea   : +frequency No dysuria, hematuria, or discharge  MSK: No joint pain or swelling  Ext: No edema, no claudication  Skin: No rashes or itching  Heme: No easy bruising or petechiae  Neuro: No confusion, numbness, focal weakness, or loss of consciousness

## 2021-12-04 ENCOUNTER — TRANSCRIPTION ENCOUNTER (OUTPATIENT)
Age: 76
End: 2021-12-04

## 2021-12-04 VITALS
TEMPERATURE: 98 F | RESPIRATION RATE: 18 BRPM | OXYGEN SATURATION: 100 % | DIASTOLIC BLOOD PRESSURE: 70 MMHG | HEART RATE: 70 BPM | SYSTOLIC BLOOD PRESSURE: 155 MMHG

## 2021-12-04 LAB
BASE EXCESS BLDV CALC-SCNC: -0.6 MMOL/L — SIGNIFICANT CHANGE UP (ref -2–3)
BLOOD GAS VENOUS COMPREHENSIVE RESULT: SIGNIFICANT CHANGE UP
CHLORIDE BLDV-SCNC: 107 MMOL/L — SIGNIFICANT CHANGE UP (ref 96–108)
CO2 BLDV-SCNC: 26.3 MMOL/L — HIGH (ref 22–26)
GAS PNL BLDV: 139 MMOL/L — SIGNIFICANT CHANGE UP (ref 136–145)
GLUCOSE BLDV-MCNC: 94 MG/DL — SIGNIFICANT CHANGE UP (ref 70–99)
HCO3 BLDV-SCNC: 25 MMOL/L — SIGNIFICANT CHANGE UP (ref 22–29)
HCT VFR BLD CALC: 26.6 % — LOW (ref 39–50)
HCT VFR BLD CALC: 28 % — LOW (ref 39–50)
HCT VFR BLDA CALC: 23 % — LOW (ref 39–51)
HGB BLD CALC-MCNC: 7.7 G/DL — LOW (ref 13–17)
HGB BLD-MCNC: 7.5 G/DL — LOW (ref 13–17)
HGB BLD-MCNC: 8 G/DL — LOW (ref 13–17)
LACTATE BLDV-MCNC: 0.9 MMOL/L — SIGNIFICANT CHANGE UP (ref 0.5–2)
MCHC RBC-ENTMCNC: 19.6 PG — LOW (ref 27–34)
MCHC RBC-ENTMCNC: 20.2 PG — LOW (ref 27–34)
MCHC RBC-ENTMCNC: 28.2 GM/DL — LOW (ref 32–36)
MCHC RBC-ENTMCNC: 28.6 GM/DL — LOW (ref 32–36)
MCV RBC AUTO: 69.6 FL — LOW (ref 80–100)
MCV RBC AUTO: 70.7 FL — LOW (ref 80–100)
NRBC # BLD: 0 /100 WBCS — SIGNIFICANT CHANGE UP
NRBC # BLD: 0 /100 WBCS — SIGNIFICANT CHANGE UP
NRBC # FLD: 0 K/UL — SIGNIFICANT CHANGE UP
NRBC # FLD: 0 K/UL — SIGNIFICANT CHANGE UP
PCO2 BLDV: 44 MMHG — SIGNIFICANT CHANGE UP (ref 42–55)
PH BLDV: 7.36 — SIGNIFICANT CHANGE UP (ref 7.32–7.43)
PLATELET # BLD AUTO: 244 K/UL — SIGNIFICANT CHANGE UP (ref 150–400)
PLATELET # BLD AUTO: 287 K/UL — SIGNIFICANT CHANGE UP (ref 150–400)
PO2 BLDV: 61 MMHG — SIGNIFICANT CHANGE UP
POTASSIUM BLDV-SCNC: 4.2 MMOL/L — SIGNIFICANT CHANGE UP (ref 3.5–5.1)
RBC # BLD: 3.82 M/UL — LOW (ref 4.2–5.8)
RBC # BLD: 3.96 M/UL — LOW (ref 4.2–5.8)
RBC # FLD: 22.1 % — HIGH (ref 10.3–14.5)
RBC # FLD: 22.5 % — HIGH (ref 10.3–14.5)
SAO2 % BLDV: 91.3 % — SIGNIFICANT CHANGE UP
WBC # BLD: 5.11 K/UL — SIGNIFICANT CHANGE UP (ref 3.8–10.5)
WBC # BLD: 5.6 K/UL — SIGNIFICANT CHANGE UP (ref 3.8–10.5)
WBC # FLD AUTO: 5.11 K/UL — SIGNIFICANT CHANGE UP (ref 3.8–10.5)
WBC # FLD AUTO: 5.6 K/UL — SIGNIFICANT CHANGE UP (ref 3.8–10.5)

## 2021-12-04 PROCEDURE — 99239 HOSP IP/OBS DSCHRG MGMT >30: CPT

## 2021-12-04 RX ORDER — PANTOPRAZOLE SODIUM 20 MG/1
40 TABLET, DELAYED RELEASE ORAL EVERY 12 HOURS
Refills: 0 | Status: DISCONTINUED | OUTPATIENT
Start: 2021-12-04 | End: 2021-12-04

## 2021-12-04 RX ORDER — FERROUS SULFATE 325(65) MG
1 TABLET ORAL
Qty: 0 | Refills: 0 | DISCHARGE
Start: 2021-12-04 | End: 2022-02-01

## 2021-12-04 RX ORDER — ERYTHROPOIETIN 10000 [IU]/ML
20000 INJECTION, SOLUTION INTRAVENOUS; SUBCUTANEOUS ONCE
Refills: 0 | Status: COMPLETED | OUTPATIENT
Start: 2021-12-04 | End: 2021-12-04

## 2021-12-04 RX ORDER — ASPIRIN/CALCIUM CARB/MAGNESIUM 324 MG
0 TABLET ORAL
Qty: 0 | Refills: 0 | DISCHARGE

## 2021-12-04 RX ORDER — LISINOPRIL 2.5 MG/1
10 TABLET ORAL DAILY
Refills: 0 | Status: DISCONTINUED | OUTPATIENT
Start: 2021-12-04 | End: 2021-12-04

## 2021-12-04 RX ORDER — CARVEDILOL PHOSPHATE 80 MG/1
3.12 CAPSULE, EXTENDED RELEASE ORAL EVERY 12 HOURS
Refills: 0 | Status: DISCONTINUED | OUTPATIENT
Start: 2021-12-04 | End: 2021-12-04

## 2021-12-04 RX ORDER — TAMSULOSIN HYDROCHLORIDE 0.4 MG/1
0.4 CAPSULE ORAL AT BEDTIME
Refills: 0 | Status: DISCONTINUED | OUTPATIENT
Start: 2021-12-04 | End: 2021-12-04

## 2021-12-04 RX ORDER — FERROUS SULFATE 325(65) MG
1 TABLET ORAL
Qty: 30 | Refills: 0
Start: 2021-12-04 | End: 2022-02-01

## 2021-12-04 RX ORDER — PANTOPRAZOLE SODIUM 20 MG/1
1 TABLET, DELAYED RELEASE ORAL
Qty: 60 | Refills: 0
Start: 2021-12-04 | End: 2022-01-02

## 2021-12-04 RX ORDER — CARVEDILOL PHOSPHATE 80 MG/1
1 CAPSULE, EXTENDED RELEASE ORAL
Qty: 0 | Refills: 0 | DISCHARGE

## 2021-12-04 RX ORDER — ERYTHROPOIETIN 10000 [IU]/ML
20000 INJECTION, SOLUTION INTRAVENOUS; SUBCUTANEOUS
Refills: 0 | Status: DISCONTINUED | OUTPATIENT
Start: 2021-12-04 | End: 2021-12-04

## 2021-12-04 RX ORDER — PANTOPRAZOLE SODIUM 20 MG/1
1 TABLET, DELAYED RELEASE ORAL
Qty: 0 | Refills: 0 | DISCHARGE

## 2021-12-04 RX ADMIN — LISINOPRIL 10 MILLIGRAM(S): 2.5 TABLET ORAL at 13:14

## 2021-12-04 RX ADMIN — CARVEDILOL PHOSPHATE 3.12 MILLIGRAM(S): 80 CAPSULE, EXTENDED RELEASE ORAL at 01:11

## 2021-12-04 RX ADMIN — ERYTHROPOIETIN 20000 UNIT(S): 10000 INJECTION, SOLUTION INTRAVENOUS; SUBCUTANEOUS at 13:16

## 2021-12-04 RX ADMIN — PANTOPRAZOLE SODIUM 40 MILLIGRAM(S): 20 TABLET, DELAYED RELEASE ORAL at 09:20

## 2021-12-04 RX ADMIN — PANTOPRAZOLE SODIUM 40 MILLIGRAM(S): 20 TABLET, DELAYED RELEASE ORAL at 06:19

## 2021-12-04 NOTE — DISCHARGE NOTE NURSING/CASE MANAGEMENT/SOCIAL WORK - NSDCPEWEB_GEN_ALL_CORE
[facility] for Tobacco Control website --- [email or web address] website --- [email or web address]

## 2021-12-04 NOTE — DISCHARGE NOTE NURSING/CASE MANAGEMENT/SOCIAL WORK - PATIENT PORTAL LINK FT
You can access the FollowMyHealth Patient Portal offered by [facility] by registering at the following website: [email or web address]. By joining [employer]’s FollowMyHealth portal, you will also be able to view your health information using other applications (apps) compatible with our system.

## 2021-12-04 NOTE — PROGRESS NOTE ADULT - PROBLEM SELECTOR PLAN 4
Patient takes carvedilol, nifedipine, and lisinopril at home  - c/w carvedilol with hold parameters  - Resume lisinopril  - Hold nifedipine, resume as tolerated

## 2021-12-04 NOTE — PROGRESS NOTE ADULT - PROBLEM SELECTOR PLAN 2
Patient has received 3U PRBC for various anemic episodes with appropriate response   - s/p 1U PRBC in ED w/ adequate response   - check B12, folate, iron studies   - Monitor CBCs

## 2021-12-04 NOTE — DISCHARGE NOTE NURSING/CASE MANAGEMENT/SOCIAL WORK - NSDCPEEMAIL_GEN_ALL_CORE
[facility] for Tobacco Control email [email or web address]

## 2021-12-04 NOTE — PROVIDER CONTACT NOTE (OTHER) - BACKGROUND
Patient was admitted for gastrointestinal hemorrhage. Has a past medical history of GERD, STAGE chronic kidney disease, hypertension, sickle cell trait.

## 2021-12-04 NOTE — DISCHARGE NOTE PROVIDER - NSDCCPCAREPLAN_GEN_ALL_CORE_FT
PRINCIPAL DISCHARGE DIAGNOSIS  Diagnosis: Upper GI bleed  Assessment and Plan of Treatment: You presented to hospital due to weakness and dark stools which was concerning for a possible GI bleed. Your blood count was found to be low for which you received a unit of blood and showed adequate response. You were evaluated by gastroenterology and offered a push enteroscopy [procedure to evaluate for possible sources of bleeding] but opted for outpatient follow up for a balloon assisted enteroscopy which provides a better assessment of possible sources of your GI bleed. Please continue to take your medications as prescribed following your discharge. Please follow up with your primary care doctor and with gastroenterology (Dr. Dangelo) following your discharge for further management. Please return to the E.D should you experience any new or worsening symptoms.
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
## 2021-12-04 NOTE — DISCHARGE NOTE NURSING/CASE MANAGEMENT/SOCIAL WORK - NSDCPNINST_GEN_ALL_CORE
pt alert and oriented, stable to discharge. IV removed. Discharge instruction provided. pt alert and oriented, stable to discharge.  blood pressure improving with medicine. IV removed. Discharge instruction provided with copy

## 2021-12-04 NOTE — PROGRESS NOTE ADULT - ASSESSMENT
77yo Male with h/o HTN, GERD, CKD3, chronic back and neck pain, h/o cocaine use a/w generalized weakness due to symptomatic blood loss anemia 2/2 upper GIB vs small intestinal bleed;

## 2021-12-04 NOTE — DISCHARGE NOTE PROVIDER - CARE PROVIDER_API CALL
Frederic Pineda [facility]  [phone] [place] [address]  Phone: [phone]  Fax: [phone]  Follow Up Time:

## 2021-12-04 NOTE — PROGRESS NOTE ADULT - PROBLEM SELECTOR PLAN 3
2/2 to symptomatic anemia i/s/o blood loss  - GI consulted appreciate addy Torres Dc on ferrous sulfate 325mg for presumed PAULETTE

## 2021-12-04 NOTE — CONSULT NOTE ADULT - ASSESSMENT
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

## 2021-12-04 NOTE — DISCHARGE NOTE PROVIDER - HOSPITAL COURSE
77 yo M w/ PMhx HTN, HLD, GERD, CKD3 presenting w/ anemia. Patient initially presented to ED 9/2/21 w/ symptomatic anemia, characterized by fatigue. No chest pain or shortness of breath. Patient found to have Hg 6.6, transfused 2u pRBC w/ appropriate response and discharged. Patient followed up as outpatient w/ GI (Dr. Vicente Wheeler) and underwent EGD/colonoscopy (10/8/21) notable for hemorrhoids and single AVM in ascending colon. Patient then represented to ED 11/13/21 for fatigue, found to have Hg 7.1, transfused 1u pRBC and discharged from ED. Patient had outpatient capsule endoscopy (11/16/21) w/ angioectasias throughout small intestine (1% of small bowel - 32% of small bowel), with one concerning for active bleeding (32% of small bowel). Patient was referred to Dr. Milo Dangelo or Dr. Carter Finney for balloon assisted enteroscopy.   Patient was scheduled to have blood work on the day of presentation at [facility] as ordered by PCP, but felt fatigued and concerned that he may require additional transfusions, so instead of going to [facility] came to ED.     ROS positive for dark stools on admission, otherwise negative.     On presentation patient HDS. Labs w/ Hg 7.4 with downtrend to 6.5. Received 1u PRBC w/ adequate response. GI consulted, offered push enteroscopy, pt however opting for outpatient follow up for balloon assisted enteroscopy with Dr. Dangelo on 12/21. HD stable at this time for discharge.

## 2021-12-04 NOTE — DISCHARGE NOTE PROVIDER - NSDCMRMEDTOKEN_GEN_ALL_CORE_FT
calcitriol 0.25 mcg oral capsule: 1 cap(s) orally every 48 hours  carvedilol 3.125 mg oral tablet: 1 tab(s) orally 2 times a day  lisinopril 10 mg oral tablet: 1 tab(s) orally once a day  NIFEdipine 30 mg oral tablet, extended release: 1 tab(s) orally once a day  Protonix 40 mg oral delayed release tablet: 1 tab(s) orally 2 times a day   tamsulosin 0.4 mg oral capsule: 1 cap(s) orally once a day

## 2021-12-04 NOTE — PROGRESS NOTE ADULT - PROBLEM SELECTOR PLAN 6
Informed by pt's primary nurse that pt does not want to have surgery at this time. Called Dr Les Sloan' office who had him paged.
c/w home flomax

## 2021-12-04 NOTE — PROGRESS NOTE ADULT - PROBLEM SELECTOR PLAN 5
Sees outside nephrologist; Stable;  - resume home calcitriol   - sCr at baseline   - Trend BUN/Cr.  - Strict I/O  - Avoid nephrotoxic agents.

## 2021-12-04 NOTE — DISCHARGE NOTE NURSING/CASE MANAGEMENT/SOCIAL WORK - NSDCPEFALRISK_GEN_ALL_CORE
For information on Fall & Injury Prevention, visit: [email or web address] OR  [email or web address] OR  https://www.cdc.gov/steadi/patient.html

## 2021-12-04 NOTE — PATIENT PROFILE ADULT - FALL HARM RISK - HARM RISK INTERVENTIONS

## 2021-12-04 NOTE — PROGRESS NOTE ADULT - SUBJECTIVE AND OBJECTIVE BOX
Odette Sheridan MD  PGY 3 Department of Internal Medicine  Pager: [phone] ([facility])   Pager: [phone] ([facility])    Patient is a 76y old  Male who presents with a chief complaint of Weeks of generalized weakness (03 Dec 2021 19:22)      SUBJECTIVE / OVERNIGHT EVENTS: Pt seen and examined. No acute overnight events. Denies fevers, chills, CP, SOB, Abdominal pain, N/V, Constipation, Diarrhea        MEDICATIONS  (STANDING):  carvedilol 3.125 milliGRAM(s) Oral every 12 hours  pantoprazole  Injectable 40 milliGRAM(s) IV Push every 12 hours  tamsulosin 0.4 milliGRAM(s) Oral at bedtime    MEDICATIONS  (PRN):  melatonin 3 milliGRAM(s) Oral at bedtime PRN Insomnia      I&O's Summary      Vital Signs Last 24 Hrs  T(C): 36.7 (04 Dec 2021 06:20), Max: 37.1 (03 Dec 2021 18:25)  T(F): 98.1 (04 Dec 2021 06:20), Max: 98.7 (03 Dec 2021 18:25)  HR: 70 (04 Dec 2021 06:20) (63 - 73)  BP: 154/78 (04 Dec 2021 06:20) (106/53 - 174/83)  BP(mean): --  RR: 17 (04 Dec 2021 06:20) (16 - 18)  SpO2: 100% (04 Dec 2021 06:20) (100% - 100%)    CAPILLARY BLOOD GLUCOSE      POCT Blood Glucose.: 106 mg/dL (03 Dec 2021 11:22)      PHYSICAL EXAM:  GENERAL APPEARANCE: Well developed, NAD  HEENT:  EOMI. hearing grossly intact.  NECK: Neck supple, no masses or thyromegaly.  CARDIAC: Normal S1 and S2. no mrg. RRR  LUNGS: Clear to auscultation B/L, no rales, rhonchi, or wheezing  ABDOMEN: Soft , NTND, bowel sounds normal. No guarding or rebound.   MUSCULOSKELETAL: ROM intact.  No joint erythema or tenderness.   EXTREMITIES: No edema. Peripheral pulses intact.   NEUROLOGICAL: Non focal. Strength and sensation symmetric and intact throughout.   SKIN: Warm and dry , Well perfused  PSYCHIATRIC: AOx3 , Normal mood and affect        LABS:                        8.0    5.11  )-----------( 287      ( 04 Dec 2021 07:13 )             28.0     Auto Eosinophil # x     / Auto Eosinophil % x     / Auto Neutrophil # x     / Auto Neutrophil % x     / BANDS % x                            7.5    5.60  )-----------( 244      ( 04 Dec 2021 00:47 )             26.6     Auto Eosinophil # x     / Auto Eosinophil % x     / Auto Neutrophil # x     / Auto Neutrophil % x     / BANDS % x                            6.5    5.47  )-----------( 248      ( 03 Dec 2021 17:26 )             22.8     Auto Eosinophil # 0.16  / Auto Eosinophil % 2.9   / Auto Neutrophil # 2.56  / Auto Neutrophil % 46.9  / BANDS % x        12-03    139  |  101  |  26<H>  ----------------------------<  80  3.6   |  26  |  2.18<H>    Ca    9.0      03 Dec 2021 11:25  Mg     1.80     12-03  Phos  2.8     12-03  TPro  7.1  /  Alb  4.4  /  TBili  0.4  /  DBili  x   /  AST  21  /  ALT  13  /  AlkPhos  58  12-03    PT/INR - ( 03 Dec 2021 11:25 )   PT: 11.7 sec;   INR: 1.03 ratio         PTT - ( 03 Dec 2021 11:25 )  PTT:30.6 sec              RADIOLOGY & ADDITIONAL TESTS:    Imaging Personally Reviewed:    Consultant(s) Notes Reviewed:      Care Discussed with Consultants/Other Providers:

## 2021-12-04 NOTE — PROGRESS NOTE ADULT - PROBLEM SELECTOR PLAN 1
h/o melena; upper GIB vs small intestinal bleed with Hgb=6.5 down from 8.2 in September, 2021;  -s/p 1 unit PRBC w/ adequate response  - c/w protonix 40 BID  - GI consulted for worsening anemia  - Offered inpatient endoscopic evaluation (push enteroscopy vs balloon assisted enteroscopy), pt however requesting dc  - Outpatient f/u with Dr. Milo Dangelo   - Consider for endoscopy evaluation (push enteroscopy vs balloon assisted enteroscopy)  - h/o melena; upper GIB vs small intestinal bleed with Hgb=6.5 down from 8.2 in September, 2021;  -s/p 1 unit PRBC w/ adequate response  - c/w protonix 40 BID  - GI consulted for worsening anemia  - Offered inpatient endoscopic evaluation (push enteroscopy vs balloon assisted enteroscopy), pt however requesting dc  - Likely Outpatient f/u with Dr. Milo Dangelo h/o melena; upper GIB vs small intestinal bleed with Hgb=6.5 down from 8.2 in September, 2021;  -s/p 1 unit PRBC w/ adequate response  - c/w protonix 40 BID  - GI consulted for worsening anemia  - Likely Outpatient f/u with Dr. Milo Dangelo

## 2021-12-14 ENCOUNTER — OUTPATIENT (OUTPATIENT)
Dept: OUTPATIENT SERVICES | Facility: HOSPITAL | Age: 76
LOS: 1 days | End: 2021-12-14
Payer: MEDICARE

## 2021-12-14 VITALS
SYSTOLIC BLOOD PRESSURE: 123 MMHG | WEIGHT: 149.91 LBS | TEMPERATURE: 97 F | DIASTOLIC BLOOD PRESSURE: 80 MMHG | HEART RATE: 76 BPM | OXYGEN SATURATION: 100 % | HEIGHT: 71 IN | RESPIRATION RATE: 18 BRPM

## 2021-12-14 DIAGNOSIS — N18.30 CHRONIC KIDNEY DISEASE, STAGE 3 UNSPECIFIED: ICD-10-CM

## 2021-12-14 DIAGNOSIS — K55.20 ANGIODYSPLASIA OF COLON WITHOUT HEMORRHAGE: ICD-10-CM

## 2021-12-14 DIAGNOSIS — Z98.890 OTHER SPECIFIED POSTPROCEDURAL STATES: Chronic | ICD-10-CM

## 2021-12-14 DIAGNOSIS — I10 ESSENTIAL (PRIMARY) HYPERTENSION: ICD-10-CM

## 2021-12-14 DIAGNOSIS — Z01.818 ENCOUNTER FOR OTHER PREPROCEDURAL EXAMINATION: ICD-10-CM

## 2021-12-14 DIAGNOSIS — D64.9 ANEMIA, UNSPECIFIED: ICD-10-CM

## 2021-12-14 LAB
ANION GAP SERPL CALC-SCNC: 13 MMOL/L — SIGNIFICANT CHANGE UP (ref 5–17)
BLD GP AB SCN SERPL QL: NEGATIVE — SIGNIFICANT CHANGE UP
BUN SERPL-MCNC: 26 MG/DL — HIGH (ref 7–23)
CALCIUM SERPL-MCNC: 9.2 MG/DL — SIGNIFICANT CHANGE UP (ref 8.4–10.5)
CHLORIDE SERPL-SCNC: 103 MMOL/L — SIGNIFICANT CHANGE UP (ref 96–108)
CO2 SERPL-SCNC: 23 MMOL/L — SIGNIFICANT CHANGE UP (ref 22–31)
CREAT SERPL-MCNC: 1.94 MG/DL — HIGH (ref 0.5–1.3)
GLUCOSE SERPL-MCNC: 96 MG/DL — SIGNIFICANT CHANGE UP (ref 70–99)
HCT VFR BLD CALC: 29.4 % — LOW (ref 39–50)
HGB BLD-MCNC: 8.1 G/DL — LOW (ref 13–17)
MCHC RBC-ENTMCNC: 18.9 PG — LOW (ref 27–34)
MCHC RBC-ENTMCNC: 27.6 GM/DL — LOW (ref 32–36)
MCV RBC AUTO: 68.7 FL — LOW (ref 80–100)
NRBC # BLD: 0 /100 WBCS — SIGNIFICANT CHANGE UP (ref 0–0)
PLATELET # BLD AUTO: 279 K/UL — SIGNIFICANT CHANGE UP (ref 150–400)
POTASSIUM SERPL-MCNC: 4.3 MMOL/L — SIGNIFICANT CHANGE UP (ref 3.5–5.3)
POTASSIUM SERPL-SCNC: 4.3 MMOL/L — SIGNIFICANT CHANGE UP (ref 3.5–5.3)
RBC # BLD: 4.28 M/UL — SIGNIFICANT CHANGE UP (ref 4.2–5.8)
RBC # FLD: 22.2 % — HIGH (ref 10.3–14.5)
RH IG SCN BLD-IMP: POSITIVE — SIGNIFICANT CHANGE UP
SODIUM SERPL-SCNC: 139 MMOL/L — SIGNIFICANT CHANGE UP (ref 135–145)
WBC # BLD: 5.61 K/UL — SIGNIFICANT CHANGE UP (ref 3.8–10.5)
WBC # FLD AUTO: 5.61 K/UL — SIGNIFICANT CHANGE UP (ref 3.8–10.5)

## 2021-12-14 PROCEDURE — 86901 BLOOD TYPING SEROLOGIC RH(D): CPT

## 2021-12-14 PROCEDURE — 80048 BASIC METABOLIC PNL TOTAL CA: CPT

## 2021-12-14 PROCEDURE — G0463: CPT

## 2021-12-14 PROCEDURE — 85027 COMPLETE CBC AUTOMATED: CPT

## 2021-12-14 PROCEDURE — 86900 BLOOD TYPING SEROLOGIC ABO: CPT

## 2021-12-14 PROCEDURE — 86850 RBC ANTIBODY SCREEN: CPT

## 2021-12-14 RX ORDER — NIFEDIPINE 30 MG
1 TABLET, EXTENDED RELEASE 24 HR ORAL
Qty: 0 | Refills: 0 | DISCHARGE

## 2021-12-14 RX ORDER — TAMSULOSIN HYDROCHLORIDE 0.4 MG/1
1 CAPSULE ORAL
Qty: 0 | Refills: 0 | DISCHARGE

## 2021-12-14 NOTE — H&P PST ADULT - DOES PATIENT HAVE ADVANCE DIRECTIVE
Physical Therapy Initial Evaluation and Plan of Care         Subjective Evaluation    History of Present Illness  Onset date: about 3 weeks ago.  Mechanism of injury: A crate door fell on the patient's hand, it fell on the back of the hand.      Patient Occupation: [employer]   Precautions and Work Restrictions: light dutyPain  Current pain ratin  At worst pain ratin  Location: left distal third anterior forearm and posterior wrist  Quality: sharp (constant)  Alleviating factors: brace.  Aggravating factors: movement  Progression: worsening    Hand dominance: right             Objective       Palpation     Additional Palpation Details  TTP to the left anterior wrist and thenar eminence.    Active Range of Motion     Left Wrist   Wrist flexion: 63 degrees   Wrist extension: 64 degrees   Radial deviation: 38 degrees   Ulnar deviation: 24 degrees     Strength/Myotome Testing     Left Wrist/Hand   Wrist extension: 4-  Wrist flexion: 4-    Tests     Additional Tests Details  + Varus and + Valgus Stress Test on the left         Assessment & Plan     Assessment  Impairments: abnormal or restricted ROM, activity intolerance, impaired physical strength, lacks appropriate home exercise program and pain with function  Assessment details: Patient presents with c/oi pain, TTP, limited AROM, decreased strength and positive special testing which is limiting her ability to perform full job duties and ADL'S.  Barriers to therapy: none  Prognosis: good  Prognosis details: STG's  1)  Independent with HEP  2)  Decrease pain by 50% or more  3)  AROM left wrist equal to the right without pain  4)  No TTP present    LTG's  1)  Independent with HEP progression  2)  Decrease pain by 75% or more  3)  Increase strength for the left wrist to 4+/5  4)  Negative special testing  5)  Patient to lift waist to waist up to 25 lbs  6)  Patient to return to ADL'S, including caring for her children, without limitations       Plan  Therapy  options: will be seen for skilled physical therapy services  Planned modality interventions: ultrasound  Planned therapy interventions: strengthening, stretching, therapeutic activities and home exercise program  Duration in visits: 12  Treatment plan discussed with: patient        Manual Therapy:    0     mins  61624;  Therapeutic Exercise:    8     mins  91921;     Neuromuscular Salbador:    0    mins  64610;    Therapeutic Activity:     0     mins  91602;     Gait Trainin     mins  25259;     Ultrasound:     8     mins  86263;    Work Hardening           0      mins 76938  Iontophoresis               0   mins 79912    Timed Treatment:   16   mins   Total Treatment:     28   mins    PT SIGNATURE: Brendan Ponce, PT   DATE TREATMENT INITIATED: 3/27/2019    Initial Certification  Certification Period: 2019  I certify that the therapy services are furnished while this patient is under my care.  The services outlined above are required by this patient, and will be reviewed every 90 days.     PHYSICIAN: Aleah Pond PA-C      DATE:     Please sign and return via fax to [phone].. Thank you, [facility].         Yes

## 2021-12-14 NOTE — H&P PST ADULT - NSICDXFAMILYHX_GEN_ALL_CORE_FT
FAMILY HISTORY:  Father  Still living? No  FH: colon cancer, Age at diagnosis: Age Unknown    Mother  Still living? Unknown  FH: hypertension, Age at diagnosis: Age Unknown  FH: type 2 diabetes, Age at diagnosis: Age Unknown    Sibling  Still living? No  FH: colon cancer, Age at diagnosis: Age Unknown

## 2021-12-14 NOTE — H&P PST ADULT - HISTORY OF PRESENT ILLNESS
This is a 77 y/o male with PMH of CKD stage 3, HTN, gout, GERD, back pain, BPH , with low H/H admitted to hospital 11/2021 and 12/2021  for   blood transfusion , last transfusion was 12/3/2021 with H/H 8.0/28, denies rectal bleeding , he presents today for single balloon enteroscopy anes .  pt is a poor historian, stated " had abnormal stress test 2017" and treated with ASA, however pt stop ASA 2 weeks ago due to low H/H, instructed pt to see PCP or cardiologist for evaluation prior to procedure  covid swab to be done  12/19 at [place], denies fever, cough, SOB, change in taste/smell

## 2021-12-14 NOTE — H&P PST ADULT - NSICDXPASTMEDICALHX_GEN_ALL_CORE_FT
PAST MEDICAL HISTORY:  Abnormal stress test 2017, on ASA    BPH (benign prostatic hyperplasia)     Chronic back pain opioid dependence    Colorectal polyp detected on colonoscopy x 6, 3 years ago    GERD (gastroesophageal reflux disease)     Gout     Hypertension     Stage 3 chronic kidney disease      PAST MEDICAL HISTORY:  Abnormal stress test 2017, treated with ASA per pt , but stop ASA 2 weeks ago due to anemia    BPH (benign prostatic hyperplasia)     Chronic back pain     Colorectal polyp detected on colonoscopy x 6, 3 years ago    GERD (gastroesophageal reflux disease)     Gout on no med    Hypertension     Stage 3 chronic kidney disease
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
## 2021-12-21 ENCOUNTER — APPOINTMENT (OUTPATIENT)
Dept: GASTROENTEROLOGY | Facility: CLINIC | Age: 76
End: 2021-12-21
Payer: MEDICARE

## 2021-12-21 DIAGNOSIS — D50.0 IRON DEFICIENCY ANEMIA SECONDARY TO BLOOD LOSS (CHRONIC): ICD-10-CM

## 2021-12-21 DIAGNOSIS — K55.20 ANGIODYSPLASIA OF COLON W/OUT HEMORRHAGE: ICD-10-CM

## 2021-12-21 DIAGNOSIS — J06.9 ACUTE UPPER RESPIRATORY INFECTION, UNSPECIFIED: ICD-10-CM

## 2021-12-21 DIAGNOSIS — N18.9 CHRONIC KIDNEY DISEASE, UNSPECIFIED: ICD-10-CM

## 2021-12-21 DIAGNOSIS — I10 ESSENTIAL (PRIMARY) HYPERTENSION: ICD-10-CM

## 2021-12-21 PROCEDURE — 99205 OFFICE O/P NEW HI 60 MIN: CPT | Mod: 95

## 2021-12-21 RX ORDER — CARVEDILOL 3.12 MG/1
3.12 TABLET, FILM COATED ORAL
Qty: 180 | Refills: 0 | Status: ACTIVE | COMMUNITY
Start: 2021-12-05

## 2021-12-21 RX ORDER — CALCITRIOL 0.25 UG/1
0.25 CAPSULE, LIQUID FILLED ORAL
Qty: 45 | Refills: 0 | Status: ACTIVE | COMMUNITY
Start: 2021-12-05

## 2021-12-21 RX ORDER — LISINOPRIL 10 MG/1
10 TABLET ORAL
Qty: 90 | Refills: 0 | Status: ACTIVE | COMMUNITY
Start: 2021-12-05

## 2021-12-21 RX ORDER — TAMSULOSIN HYDROCHLORIDE 0.4 MG/1
0.4 CAPSULE ORAL
Qty: 90 | Refills: 0 | Status: ACTIVE | COMMUNITY
Start: 2021-12-05

## 2021-12-21 RX ORDER — ATORVASTATIN CALCIUM 20 MG/1
20 TABLET, FILM COATED ORAL
Qty: 90 | Refills: 0 | Status: ACTIVE | COMMUNITY
Start: 2021-12-05

## 2021-12-21 RX ORDER — OXYCODONE HYDROCHLORIDE 30 MG/1
30 TABLET ORAL
Qty: 120 | Refills: 0 | Status: ACTIVE | COMMUNITY
Start: 2021-12-16

## 2021-12-21 RX ORDER — PANTOPRAZOLE 40 MG/1
40 TABLET, DELAYED RELEASE ORAL
Qty: 90 | Refills: 0 | Status: ACTIVE | COMMUNITY
Start: 2021-09-28

## 2021-12-22 ENCOUNTER — APPOINTMENT (OUTPATIENT)
Dept: GASTROENTEROLOGY | Facility: HOSPITAL | Age: 76
End: 2021-12-22

## 2021-12-27 PROBLEM — I10 HYPERTENSION, UNSPECIFIED TYPE: Status: ACTIVE | Noted: 2021-12-27

## 2021-12-27 PROBLEM — D50.0 BLOOD LOSS ANEMIA: Status: ACTIVE | Noted: 2021-12-27

## 2021-12-27 PROBLEM — J06.9 UPPER RESPIRATORY INFECTION: Status: RESOLVED | Noted: 2021-12-27 | Resolved: 2022-01-26

## 2021-12-27 PROBLEM — K55.20 AVM (ARTERIOVENOUS MALFORMATION) OF SMALL BOWEL, ACQUIRED: Status: ACTIVE | Noted: 2021-12-10

## 2021-12-27 PROBLEM — N18.9 CHRONIC RENAL INSUFFICIENCY: Status: ACTIVE | Noted: 2021-12-27

## 2021-12-27 NOTE — HISTORY OF PRESENT ILLNESS
[FreeTextEntry1] : Verbal consent obtained for telemedicine encounter.  Telemedicine encounter was performed using dedicated computer program with audio and video communication.  Face-to-face time was 35   minutes and total encounter time was  60  min.  Over 50% of encounter time was spent in face-to-face discussion regarding assessment problems and coordination of care.\par

## 2021-12-27 NOTE — PLAN
[FreeTextEntry1] : Impression:\par \par #1.  Bleeding small bowel angiectasia is contributing to blood loss anemia in the setting of chronic renal insufficiency.\par \par #2.  Upper respiratory infection symptoms\par \par Plan:\par \par #1.  Deep enteroscopy with single balloon enteroscope tomorrow\par \par Risks, benefits, alternatives of the procedure were discussed with the patient and the patient was educated about the procedure. Risks include, but are not limited to, bleeding, infection, injury to internal organs, possible need for further procedures including emergency surgery, missed lesions, risk of anesthesia, and risk of IV site problems.  Patient understands and agrees to proceed.\par \par #2.  Patient will monitor his upper respiratory infection symptoms.  If they worsen or persist, we will cancel the endoscopic procedure which is elective.  If they get better, we will proceed with scheduled deep enteroscopy tomorrow.

## 2022-01-06 PROBLEM — M10.9 GOUT, UNSPECIFIED: Chronic | Status: ACTIVE | Noted: 2021-09-02

## 2022-01-06 PROBLEM — N40.0 BENIGN PROSTATIC HYPERPLASIA WITHOUT LOWER URINARY TRACT SYMPTOMS: Chronic | Status: ACTIVE | Noted: 2021-12-14

## 2022-01-06 PROBLEM — M54.9 DORSALGIA, UNSPECIFIED: Chronic | Status: ACTIVE | Noted: 2019-12-05

## 2022-01-12 ENCOUNTER — OUTPATIENT (OUTPATIENT)
Dept: OUTPATIENT SERVICES | Facility: HOSPITAL | Age: 77
LOS: 1 days | End: 2022-01-12
Payer: MEDICARE

## 2022-01-12 VITALS
HEART RATE: 72 BPM | DIASTOLIC BLOOD PRESSURE: 78 MMHG | RESPIRATION RATE: 20 BRPM | WEIGHT: 151.9 LBS | SYSTOLIC BLOOD PRESSURE: 150 MMHG | OXYGEN SATURATION: 98 % | HEIGHT: 71 IN | TEMPERATURE: 98 F

## 2022-01-12 DIAGNOSIS — Z98.890 OTHER SPECIFIED POSTPROCEDURAL STATES: Chronic | ICD-10-CM

## 2022-01-12 DIAGNOSIS — D64.9 ANEMIA, UNSPECIFIED: ICD-10-CM

## 2022-01-12 DIAGNOSIS — Z01.818 ENCOUNTER FOR OTHER PREPROCEDURAL EXAMINATION: ICD-10-CM

## 2022-01-12 DIAGNOSIS — K55.20 ANGIODYSPLASIA OF COLON WITHOUT HEMORRHAGE: ICD-10-CM

## 2022-01-12 DIAGNOSIS — I10 ESSENTIAL (PRIMARY) HYPERTENSION: ICD-10-CM

## 2022-01-12 LAB
ANION GAP SERPL CALC-SCNC: 13 MMOL/L — SIGNIFICANT CHANGE UP (ref 5–17)
BUN SERPL-MCNC: 35 MG/DL — HIGH (ref 7–23)
CALCIUM SERPL-MCNC: 9 MG/DL — SIGNIFICANT CHANGE UP (ref 8.4–10.5)
CHLORIDE SERPL-SCNC: 105 MMOL/L — SIGNIFICANT CHANGE UP (ref 96–108)
CO2 SERPL-SCNC: 21 MMOL/L — LOW (ref 22–31)
CREAT SERPL-MCNC: 2.18 MG/DL — HIGH (ref 0.5–1.3)
GLUCOSE SERPL-MCNC: 89 MG/DL — SIGNIFICANT CHANGE UP (ref 70–99)
HCT VFR BLD CALC: 31.1 % — LOW (ref 39–50)
HGB BLD-MCNC: 8.7 G/DL — LOW (ref 13–17)
MCHC RBC-ENTMCNC: 20.1 PG — LOW (ref 27–34)
MCHC RBC-ENTMCNC: 28 GM/DL — LOW (ref 32–36)
MCV RBC AUTO: 72 FL — LOW (ref 80–100)
NRBC # BLD: 0 /100 WBCS — SIGNIFICANT CHANGE UP (ref 0–0)
PLATELET # BLD AUTO: 333 K/UL — SIGNIFICANT CHANGE UP (ref 150–400)
POTASSIUM SERPL-MCNC: 4.1 MMOL/L — SIGNIFICANT CHANGE UP (ref 3.5–5.3)
POTASSIUM SERPL-SCNC: 4.1 MMOL/L — SIGNIFICANT CHANGE UP (ref 3.5–5.3)
RBC # BLD: 4.32 M/UL — SIGNIFICANT CHANGE UP (ref 4.2–5.8)
RBC # FLD: 23.8 % — HIGH (ref 10.3–14.5)
SODIUM SERPL-SCNC: 139 MMOL/L — SIGNIFICANT CHANGE UP (ref 135–145)
WBC # BLD: 4.5 K/UL — SIGNIFICANT CHANGE UP (ref 3.8–10.5)
WBC # FLD AUTO: 4.5 K/UL — SIGNIFICANT CHANGE UP (ref 3.8–10.5)

## 2022-01-12 PROCEDURE — 80048 BASIC METABOLIC PNL TOTAL CA: CPT

## 2022-01-12 PROCEDURE — 85027 COMPLETE CBC AUTOMATED: CPT

## 2022-01-12 RX ORDER — ASPIRIN/CALCIUM CARB/MAGNESIUM 324 MG
0 TABLET ORAL
Qty: 0 | Refills: 0 | DISCHARGE

## 2022-01-12 NOTE — H&P PST ADULT - HEIGHT IN FEET
[Irregular Periods] : irregular periods [Home] : at home, [unfilled] , at the time of the visit. [Medical Office: ([facility])___] : at the medical office located in  [Mother] : mother [FreeTextEntry1] : Last menses in 06/19 [Polyuria] : no polyuria [Visual Symptoms] : no ~T visual symptoms [Headaches] : no headaches [Polydipsia] : no polydipsia [Constipation] : no constipation [Cold Intolerance] : no cold intolerance [Fatigue] : no fatigue [Abdominal Pain] : no abdominal pain [Weight Loss] : no weight loss [FreeTextEntry2] : Gosia is a 16 year 8 month old Female with celiac disease here for follow up evaluation of secondary amenorrhea. She had menarche at the age of 13 years old in 10/16, and continued to have menses until 06/18 without excessive pain or bleeding and lasted 5 days. She notes she has always been an active runner for the past 2.5 years in track, running 4.5 miles daily. She had been evaluated by Dr. Ny for secondary amenorrhea and 5 pound weight loss, down to 92 pounds, and obtained bloodwork in 02/19 showing normal TSH of 1.31 and normal free T4 of 1.0, FSH of 7.1 and LH of 1.9, normal prolactin, and positive TTG IgA, concerning for celiac. \par \par Since then she has been evaluated by Dr. Marques in 05/19 for GI, who recommended gluten free diet, which she has been able to maintain since 05/19.  Today, her weight has increased to 105 pounds (19th% percentile).\par \par At her visit on 7/2019 ,patient took medroxyprogesterone for 10 days and had a withdrawal bleed subsequent to this but not again. Was then seen again on 11/2019 when she reported continue not having her menstrual periods .\par \par At that time we have discussed with the family that its likely related to ue to a hypothalamic issue stemming from both regular vigorous exercise and celiac disease, with her lean BMI being 18.3 (19th percentile). \par Today we have spoken to Gosia and her mother via telemedicine and she reported being ion good health .She had mild vaginal spotting around 2 weeks ago that lat around 2 days and looked bright red but not like a menstrual period and since then did not have any .\par \par She is trying to keep her self active and continue exercising despite the current lock down and she feels that her weight is still the same (used home scale to weight her self 2 days ago and was 105 pounds ). 5

## 2022-01-12 NOTE — H&P PST ADULT - FALL HARM RISK - UNIVERSAL INTERVENTIONS
Bed in lowest position, wheels locked, appropriate side rails in place/Call bell, personal items and telephone in reach/Instruct patient to call for assistance before getting out of bed or chair/Non-slip footwear when patient is out of bed/[place] to call system/Physically safe environment - no spills, clutter or unnecessary equipment/Purposeful Proactive Rounding/Room/bathroom lighting operational, light cord in reach
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
## 2022-01-12 NOTE — H&P PST ADULT - HISTORY OF PRESENT ILLNESS
This is a 75 y/o male with PMH of CKD stage 3, HTN, gout, GERD, back pain, BPH , with low H/H admitted to hospital 11/2021 and 12/2021  for   blood transfusion , last transfusion was 12/3/2021     covid swab to be done  1/19 at [place]  Denies Recent travel, Exposure or Covid symptoms   76 year old male with history of HTN, CKD, GERD, BPH , CHF - EF - 55 % (2019), Anemia - 9/2021- admitted with fatigue- Hgb - 6.6- transfused 2 U PRBC , Had egd/colonoscopy - 10/2021-hemorrhoids & single AVM in ascending colon , Pt went to ER 11/2021- For fatigue -hgb- 7.1- transfused 1 U PRBC , Had capsule endoscopy with angiotectsis through out small intestine concerning for active bleed.  Pt again went to ER with fatigue - hgb - 6.6 - transfused 1 U PRBC 12/2021. Pt was initially scheduled for 12/2021, was cancelled due to cold prior to procedure. Now coming on 1/21/2022.       Covid  swab done  1/19 at [place]  Denies Recent travel, Exposure or Covid symptoms

## 2022-01-12 NOTE — H&P PST ADULT - NSICDXPASTMEDICALHX_GEN_ALL_CORE_FT
PAST MEDICAL HISTORY:  Anemia 3 U prbc - last 12/2021    BPH (benign prostatic hyperplasia)     Cervical spondylosis     Chronic back pain     Colorectal polyp detected on colonoscopy x 6, 3 years ago, X 1 , RECENTLY    GERD (gastroesophageal reflux disease)     Gout on no med    Hypertension     Osteoarthritis     Stage 3 chronic kidney disease      PAST MEDICAL HISTORY:  Anemia 3 U prbc - last 12/2021    BPH (benign prostatic hyperplasia)     Cervical spondylosis     Chronic back pain     Colorectal polyp detected on colonoscopy x 6, 3 years ago, X 1 , RECENTLY    GERD (gastroesophageal reflux disease)     Gout on no med    History of CHF (congestive heart failure) On Carvedilol    Hydrocele     Hypertension     MGUS (monoclonal gammopathy of unknown significance)     Osteoarthritis     Sickle cell trait     Stage 4 chronic kidney disease

## 2022-02-01 PROBLEM — Z86.79 PERSONAL HISTORY OF OTHER DISEASES OF THE CIRCULATORY SYSTEM: Chronic | Status: ACTIVE | Noted: 2022-01-12

## 2022-02-01 PROBLEM — K63.5 POLYP OF COLON: Chronic | Status: ACTIVE | Noted: 2021-09-02

## 2022-02-01 PROBLEM — D57.3 SICKLE-CELL TRAIT: Chronic | Status: ACTIVE | Noted: 2022-01-12

## 2022-02-01 PROBLEM — M47.812 SPONDYLOSIS WITHOUT MYELOPATHY OR RADICULOPATHY, CERVICAL REGION: Chronic | Status: ACTIVE | Noted: 2022-01-12

## 2022-02-01 PROBLEM — N43.3 HYDROCELE, UNSPECIFIED: Chronic | Status: ACTIVE | Noted: 2022-01-12

## 2022-02-01 PROBLEM — D47.2 MONOCLONAL GAMMOPATHY: Chronic | Status: ACTIVE | Noted: 2022-01-12

## 2022-02-01 PROBLEM — D64.9 ANEMIA, UNSPECIFIED: Chronic | Status: ACTIVE | Noted: 2022-01-12

## 2022-02-01 PROBLEM — N18.4 CHRONIC KIDNEY DISEASE, STAGE 4 (SEVERE): Chronic | Status: ACTIVE | Noted: 2022-01-12

## 2022-02-01 PROBLEM — M19.90 UNSPECIFIED OSTEOARTHRITIS, UNSPECIFIED SITE: Chronic | Status: ACTIVE | Noted: 2022-01-12

## 2022-02-14 ENCOUNTER — OUTPATIENT (OUTPATIENT)
Dept: OUTPATIENT SERVICES | Facility: HOSPITAL | Age: 77
LOS: 1 days | End: 2022-02-14
Payer: MEDICARE

## 2022-02-14 VITALS
OXYGEN SATURATION: 100 % | HEIGHT: 71 IN | DIASTOLIC BLOOD PRESSURE: 75 MMHG | TEMPERATURE: 98 F | SYSTOLIC BLOOD PRESSURE: 123 MMHG | RESPIRATION RATE: 16 BRPM | HEART RATE: 81 BPM | WEIGHT: 145.06 LBS

## 2022-02-14 DIAGNOSIS — Z98.890 OTHER SPECIFIED POSTPROCEDURAL STATES: Chronic | ICD-10-CM

## 2022-02-14 DIAGNOSIS — Z01.818 ENCOUNTER FOR OTHER PREPROCEDURAL EXAMINATION: ICD-10-CM

## 2022-02-14 DIAGNOSIS — K55.20 ANGIODYSPLASIA OF COLON WITHOUT HEMORRHAGE: ICD-10-CM

## 2022-02-14 LAB
ANION GAP SERPL CALC-SCNC: 14 MMOL/L — SIGNIFICANT CHANGE UP (ref 5–17)
BUN SERPL-MCNC: 32 MG/DL — HIGH (ref 7–23)
CALCIUM SERPL-MCNC: 9.3 MG/DL — SIGNIFICANT CHANGE UP (ref 8.4–10.5)
CHLORIDE SERPL-SCNC: 105 MMOL/L — SIGNIFICANT CHANGE UP (ref 96–108)
CO2 SERPL-SCNC: 23 MMOL/L — SIGNIFICANT CHANGE UP (ref 22–31)
CREAT SERPL-MCNC: 2.39 MG/DL — HIGH (ref 0.5–1.3)
GLUCOSE SERPL-MCNC: 79 MG/DL — SIGNIFICANT CHANGE UP (ref 70–99)
HCT VFR BLD CALC: 39.5 % — SIGNIFICANT CHANGE UP (ref 39–50)
HGB BLD-MCNC: 11.1 G/DL — LOW (ref 13–17)
MCHC RBC-ENTMCNC: 21.6 PG — LOW (ref 27–34)
MCHC RBC-ENTMCNC: 28.1 GM/DL — LOW (ref 32–36)
MCV RBC AUTO: 76.8 FL — LOW (ref 80–100)
NRBC # BLD: 0 /100 WBCS — SIGNIFICANT CHANGE UP (ref 0–0)
PLATELET # BLD AUTO: 261 K/UL — SIGNIFICANT CHANGE UP (ref 150–400)
POTASSIUM SERPL-MCNC: 4.1 MMOL/L — SIGNIFICANT CHANGE UP (ref 3.5–5.3)
POTASSIUM SERPL-SCNC: 4.1 MMOL/L — SIGNIFICANT CHANGE UP (ref 3.5–5.3)
RBC # BLD: 5.14 M/UL — SIGNIFICANT CHANGE UP (ref 4.2–5.8)
RBC # FLD: 21.4 % — HIGH (ref 10.3–14.5)
SODIUM SERPL-SCNC: 142 MMOL/L — SIGNIFICANT CHANGE UP (ref 135–145)
WBC # BLD: 5.02 K/UL — SIGNIFICANT CHANGE UP (ref 3.8–10.5)
WBC # FLD AUTO: 5.02 K/UL — SIGNIFICANT CHANGE UP (ref 3.8–10.5)

## 2022-02-14 PROCEDURE — G0463: CPT

## 2022-02-14 PROCEDURE — 80048 BASIC METABOLIC PNL TOTAL CA: CPT

## 2022-02-14 PROCEDURE — 85027 COMPLETE CBC AUTOMATED: CPT

## 2022-02-14 RX ORDER — PANTOPRAZOLE SODIUM 20 MG/1
1 TABLET, DELAYED RELEASE ORAL
Qty: 0 | Refills: 0 | DISCHARGE

## 2022-02-14 NOTE — H&P PST ADULT - NSANTHAGERD_ENT_A_CORE
Chart review completed 2020.  Care Everywhere updates requested and reviewed.  Immunizations reconciled. Media reports reviewed.  Duplicate HM overrides and  orders removed.  Overdue HM topic chart audit and/or requested.  Overdue lab testing linked to upcoming lab appointments if applies.      Health Maintenance Due   Topic Date Due    Hepatitis C Screening  1998    HPV Vaccines (1 - 2-dose series) 2009    HIV Screening  2013    TETANUS VACCINE  2016    Pap Smear  2019    Influenza Vaccine (1) 2020       
Yes
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
## 2022-02-14 NOTE — H&P PST ADULT - HISTORY OF PRESENT ILLNESS
76 year old male with history of HTN, CKD, GERD, BPH , CHF - EF - 55 % (2019), Anemia - 9/2021- admitted with fatigue- Hgb - 6.6- transfused 2 U PRBC , Had egd/ colonoscopy - 10/2021-hemorrhoids & single AVM in ascending colon , Pt went to ER 11/2021- For fatigue -hgb- 7.1- transfused 1 U PRBC , Had capsule endoscopy with angiotectsis through out small intestine concerning for active bleed.  Pt again went to ER with fatigue - hgb - 6.6 - transfused 1 U PRBC 12/2021. Pt was initially scheduled for EGD on 12/2021, was cancelled due to cold , also cancelled in 1/21/2022 due to covid + . Presents to PST for reschdulded EGD on 2/23/22.    Covid + results n HIE   e mailed OR booking, and surgeon

## 2022-02-14 NOTE — H&P PST ADULT - NSICDXPASTMEDICALHX_GEN_ALL_CORE_FT
PAST MEDICAL HISTORY:  Anemia 3 U prbc - last 12/2021    BPH (benign prostatic hyperplasia)     Cervical spondylosis     Chronic back pain     Colorectal polyp detected on colonoscopy x 6, 3 years ago, X 1 , RECENTLY    GERD (gastroesophageal reflux disease)     Gout on no med    History of CHF (congestive heart failure) On Carvedilol    Hydrocele     Hypertension     MGUS (monoclonal gammopathy of unknown significance)     Osteoarthritis     Sickle cell trait     Stage 4 chronic kidney disease

## 2022-02-14 NOTE — H&P PST ADULT - PROBLEM SELECTOR PLAN 1
Endoscopy on 2/23/22  pre- Op Instructions discussed   Labs repeated due to anemia   Covid +1/18/22 see results in HIE

## 2022-02-23 ENCOUNTER — APPOINTMENT (OUTPATIENT)
Dept: GASTROENTEROLOGY | Facility: HOSPITAL | Age: 77
End: 2022-02-23

## 2022-02-23 ENCOUNTER — OUTPATIENT (OUTPATIENT)
Dept: OUTPATIENT SERVICES | Facility: HOSPITAL | Age: 77
LOS: 1 days | End: 2022-02-23
Payer: MEDICARE

## 2022-02-23 VITALS
OXYGEN SATURATION: 99 % | SYSTOLIC BLOOD PRESSURE: 145 MMHG | RESPIRATION RATE: 17 BRPM | DIASTOLIC BLOOD PRESSURE: 82 MMHG | HEART RATE: 65 BPM

## 2022-02-23 VITALS
OXYGEN SATURATION: 100 % | SYSTOLIC BLOOD PRESSURE: 149 MMHG | TEMPERATURE: 98 F | WEIGHT: 149.91 LBS | RESPIRATION RATE: 17 BRPM | DIASTOLIC BLOOD PRESSURE: 81 MMHG | HEIGHT: 71 IN | HEART RATE: 56 BPM

## 2022-02-23 DIAGNOSIS — Z98.890 OTHER SPECIFIED POSTPROCEDURAL STATES: Chronic | ICD-10-CM

## 2022-02-23 DIAGNOSIS — K55.20 ANGIODYSPLASIA OF COLON WITHOUT HEMORRHAGE: ICD-10-CM

## 2022-02-23 PROCEDURE — 44366 SMALL BOWEL ENDOSCOPY: CPT

## 2022-02-23 PROCEDURE — 91111 GI TRC IMG INTRAL ESOPHAGUS: CPT

## 2022-02-23 PROCEDURE — 44369 SMALL BOWEL ENDOSCOPY: CPT | Mod: GC

## 2022-02-23 PROCEDURE — C1889: CPT

## 2022-02-23 DEVICE — CLIP RESOLUTION 360 235CM: Type: IMPLANTABLE DEVICE | Status: FUNCTIONAL

## 2022-02-23 RX ORDER — LIDOCAINE HCL 20 MG/ML
4 VIAL (ML) INJECTION ONCE
Refills: 0 | Status: DISCONTINUED | OUTPATIENT
Start: 2022-02-23 | End: 2022-03-09
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
## 2022-02-23 NOTE — ASU PATIENT PROFILE, ADULT - FALL HARM RISK - UNIVERSAL INTERVENTIONS
Bed in lowest position, wheels locked, appropriate side rails in place/Call bell, personal items and telephone in reach/Instruct patient to call for assistance before getting out of bed or chair/Non-slip footwear when patient is out of bed/[place] to call system/Physically safe environment - no spills, clutter or unnecessary equipment/Purposeful Proactive Rounding/Room/bathroom lighting operational, light cord in reach

## 2022-03-01 NOTE — ED CDU PROVIDER SUBSEQUENT DAY NOTE - NS ED ATTENDING STATEMENT MOD
Attending with V-Y Plasty Text: The defect edges were debeveled with a #15 scalpel blade.  Given the location of the defect, shape of the defect and the proximity to free margins an V-Y advancement flap was deemed most appropriate.  Using a sterile surgical marker, an appropriate advancement flap was drawn incorporating the defect and placing the expected incisions within the relaxed skin tension lines where possible.    The area thus outlined was incised deep to adipose tissue with a #15 scalpel blade.  The skin margins were undermined to an appropriate distance in all directions utilizing iris scissors.

## 2022-03-29 ENCOUNTER — APPOINTMENT (OUTPATIENT)
Dept: UROLOGY | Facility: CLINIC | Age: 77
End: 2022-03-29
Payer: MEDICARE

## 2022-03-29 DIAGNOSIS — F17.210 NICOTINE DEPENDENCE, CIGARETTES, UNCOMPLICATED: ICD-10-CM

## 2022-03-29 PROCEDURE — 99204 OFFICE O/P NEW MOD 45 MIN: CPT

## 2022-03-29 RX ORDER — POLYETHYLENE GLYCOL 3350 17 G/17G
17 POWDER, FOR SOLUTION ORAL DAILY
Qty: 1 | Refills: 1 | Status: ACTIVE | COMMUNITY
Start: 2022-03-29 | End: 1900-01-01

## 2022-03-29 NOTE — REVIEW OF SYSTEMS
[Wake up at night to urinate  How many times?  ___] : wakes up to urinate [unfilled] times during the night [Strong urge to urinate] : strong urge to urinate [Wait a long time to urinate] : waits a long time to urinate [Leakage of urine with urgency] : leakage of urine with urgency [Negative] : Heme/Lymph [FreeTextEntry4] : swollen testicles  [FreeTextEntry2] : Frequent urination

## 2022-03-29 NOTE — ASSESSMENT
[FreeTextEntry1] : frequency \par will manage constipation \par reduce caffeine \par if no improvement can consider adding anticholinergics\par \par scrotal sono

## 2022-03-29 NOTE — PHYSICAL EXAM
[General Appearance - Well Developed] : well developed [General Appearance - Well Nourished] : well nourished [Normal Appearance] : normal appearance [Well Groomed] : well groomed [General Appearance - In No Acute Distress] : no acute distress [Edema] : no peripheral edema [Respiration, Rhythm And Depth] : normal respiratory rhythm and effort [Exaggerated Use Of Accessory Muscles For Inspiration] : no accessory muscle use [Abdomen Soft] : soft [Abdomen Tenderness] : non-tender [Costovertebral Angle Tenderness] : no ~M costovertebral angle tenderness [Urethral Meatus] : meatus normal [Urinary Bladder Findings] : the bladder was normal on palpation [Scrotum] : the scrotum was normal [Testes Mass (___cm)] : there were no testicular masses [FreeTextEntry1] : scrotal swelling  [Normal Station and Gait] : the gait and station were normal for the patient's age [] : no rash [No Focal Deficits] : no focal deficits [Oriented To Time, Place, And Person] : oriented to person, place, and time [Affect] : the affect was normal [Mood] : the mood was normal [Not Anxious] : not anxious [No Palpable Adenopathy] : no palpable adenopathy

## 2022-03-29 NOTE — HISTORY OF PRESENT ILLNESS
[FreeTextEntry1] : cc frequent urination/scrotal swelling \par 77 yo male c/o frequency and urgency \par no dysuria or hematuria \par started on tamsulosin minimal improvement \par on iron for low hgb moves bowels every 2-3  days \par 1 cup tea in am \par also c/o scrotal swelling \par no pain

## 2022-04-04 LAB
APPEARANCE: CLEAR
BACTERIA: NEGATIVE
BILIRUBIN URINE: NEGATIVE
BLOOD URINE: NEGATIVE
COLOR: NORMAL
GLUCOSE QUALITATIVE U: NEGATIVE
HYALINE CASTS: 0 /LPF
KETONES URINE: NEGATIVE
LEUKOCYTE ESTERASE URINE: NEGATIVE
MICROSCOPIC-UA: NORMAL
NITRITE URINE: NEGATIVE
PH URINE: 6
PROTEIN URINE: NEGATIVE
RED BLOOD CELLS URINE: 0 /HPF
SPECIFIC GRAVITY URINE: 1.01
SQUAMOUS EPITHELIAL CELLS: 0 /HPF
UROBILINOGEN URINE: NORMAL
WHITE BLOOD CELLS URINE: 0 /HPF

## 2022-10-05 ENCOUNTER — EMERGENCY (EMERGENCY)
Facility: HOSPITAL | Age: 77
LOS: 0 days | Discharge: HOME | End: 2022-10-05
Attending: STUDENT IN AN ORGANIZED HEALTH CARE EDUCATION/TRAINING PROGRAM

## 2022-10-05 VITALS
OXYGEN SATURATION: 97 % | RESPIRATION RATE: 16 BRPM | DIASTOLIC BLOOD PRESSURE: 88 MMHG | TEMPERATURE: 98 F | SYSTOLIC BLOOD PRESSURE: 144 MMHG | HEART RATE: 79 BPM | WEIGHT: 139.99 LBS | HEIGHT: 71 IN

## 2022-10-05 VITALS
TEMPERATURE: 98 F | OXYGEN SATURATION: 100 % | RESPIRATION RATE: 16 BRPM | DIASTOLIC BLOOD PRESSURE: 73 MMHG | HEART RATE: 76 BPM | SYSTOLIC BLOOD PRESSURE: 124 MMHG

## 2022-10-05 DIAGNOSIS — Z98.890 OTHER SPECIFIED POSTPROCEDURAL STATES: Chronic | ICD-10-CM

## 2022-10-05 DIAGNOSIS — R07.89 OTHER CHEST PAIN: ICD-10-CM

## 2022-10-05 DIAGNOSIS — I13.0 HYPERTENSIVE HEART AND CHRONIC KIDNEY DISEASE WITH HEART FAILURE AND STAGE 1 THROUGH STAGE 4 CHRONIC KIDNEY DISEASE, OR UNSPECIFIED CHRONIC KIDNEY DISEASE: ICD-10-CM

## 2022-10-05 DIAGNOSIS — N18.4 CHRONIC KIDNEY DISEASE, STAGE 4 (SEVERE): ICD-10-CM

## 2022-10-05 DIAGNOSIS — D64.9 ANEMIA, UNSPECIFIED: ICD-10-CM

## 2022-10-05 DIAGNOSIS — E78.5 HYPERLIPIDEMIA, UNSPECIFIED: ICD-10-CM

## 2022-10-05 DIAGNOSIS — I50.9 HEART FAILURE, UNSPECIFIED: ICD-10-CM

## 2022-10-05 DIAGNOSIS — M10.9 GOUT, UNSPECIFIED: ICD-10-CM

## 2022-10-05 DIAGNOSIS — I25.10 ATHEROSCLEROTIC HEART DISEASE OF NATIVE CORONARY ARTERY WITHOUT ANGINA PECTORIS: ICD-10-CM

## 2022-10-05 DIAGNOSIS — K21.9 GASTRO-ESOPHAGEAL REFLUX DISEASE WITHOUT ESOPHAGITIS: ICD-10-CM

## 2022-10-05 DIAGNOSIS — M19.90 UNSPECIFIED OSTEOARTHRITIS, UNSPECIFIED SITE: ICD-10-CM

## 2022-10-05 LAB
ALBUMIN SERPL ELPH-MCNC: 3.4 G/DL — SIGNIFICANT CHANGE UP (ref 3.3–5)
ALP SERPL-CCNC: 67 U/L — SIGNIFICANT CHANGE UP (ref 40–120)
ALT FLD-CCNC: 23 U/L — SIGNIFICANT CHANGE UP (ref 12–78)
ANION GAP SERPL CALC-SCNC: 8 MMOL/L — SIGNIFICANT CHANGE UP (ref 5–17)
APTT BLD: 32.8 SEC — SIGNIFICANT CHANGE UP (ref 27.5–35.5)
AST SERPL-CCNC: 28 U/L — SIGNIFICANT CHANGE UP (ref 15–37)
BASOPHILS # BLD AUTO: 0.05 K/UL — SIGNIFICANT CHANGE UP (ref 0–0.2)
BASOPHILS NFR BLD AUTO: 1.1 % — SIGNIFICANT CHANGE UP (ref 0–2)
BILIRUB SERPL-MCNC: 0.4 MG/DL — SIGNIFICANT CHANGE UP (ref 0.2–1.2)
BUN SERPL-MCNC: 39 MG/DL — HIGH (ref 7–23)
CALCIUM SERPL-MCNC: 8.7 MG/DL — SIGNIFICANT CHANGE UP (ref 8.5–10.1)
CHLORIDE SERPL-SCNC: 105 MMOL/L — SIGNIFICANT CHANGE UP (ref 96–108)
CO2 SERPL-SCNC: 24 MMOL/L — SIGNIFICANT CHANGE UP (ref 22–31)
CREAT SERPL-MCNC: 2.26 MG/DL — HIGH (ref 0.5–1.3)
EGFR: 29 ML/MIN/1.73M2 — LOW
EOSINOPHIL # BLD AUTO: 0.12 K/UL — SIGNIFICANT CHANGE UP (ref 0–0.5)
EOSINOPHIL NFR BLD AUTO: 2.6 % — SIGNIFICANT CHANGE UP (ref 0–6)
GLUCOSE SERPL-MCNC: 84 MG/DL — SIGNIFICANT CHANGE UP (ref 70–99)
HCT VFR BLD CALC: 36 % — LOW (ref 39–50)
HGB BLD-MCNC: 11.3 G/DL — LOW (ref 13–17)
IMM GRANULOCYTES NFR BLD AUTO: 0.2 % — SIGNIFICANT CHANGE UP (ref 0–0.9)
INR BLD: 0.9 RATIO — SIGNIFICANT CHANGE UP (ref 0.88–1.16)
LYMPHOCYTES # BLD AUTO: 1.55 K/UL — SIGNIFICANT CHANGE UP (ref 1–3.3)
LYMPHOCYTES # BLD AUTO: 34.1 % — SIGNIFICANT CHANGE UP (ref 13–44)
MCHC RBC-ENTMCNC: 27.9 PG — SIGNIFICANT CHANGE UP (ref 27–34)
MCHC RBC-ENTMCNC: 31.4 G/DL — LOW (ref 32–36)
MCV RBC AUTO: 88.9 FL — SIGNIFICANT CHANGE UP (ref 80–100)
MONOCYTES # BLD AUTO: 0.46 K/UL — SIGNIFICANT CHANGE UP (ref 0–0.9)
MONOCYTES NFR BLD AUTO: 10.1 % — SIGNIFICANT CHANGE UP (ref 2–14)
NEUTROPHILS # BLD AUTO: 2.36 K/UL — SIGNIFICANT CHANGE UP (ref 1.8–7.4)
NEUTROPHILS NFR BLD AUTO: 51.9 % — SIGNIFICANT CHANGE UP (ref 43–77)
NRBC # BLD: 0 /100 WBCS — SIGNIFICANT CHANGE UP (ref 0–0)
NT-PROBNP SERPL-SCNC: 123 PG/ML — SIGNIFICANT CHANGE UP (ref 0–450)
PLATELET # BLD AUTO: 245 K/UL — SIGNIFICANT CHANGE UP (ref 150–400)
POTASSIUM SERPL-MCNC: 3.9 MMOL/L — SIGNIFICANT CHANGE UP (ref 3.5–5.3)
POTASSIUM SERPL-SCNC: 3.9 MMOL/L — SIGNIFICANT CHANGE UP (ref 3.5–5.3)
PROT SERPL-MCNC: 7.5 GM/DL — SIGNIFICANT CHANGE UP (ref 6–8.3)
PROTHROM AB SERPL-ACNC: 10.7 SEC — SIGNIFICANT CHANGE UP (ref 10.5–13.4)
RBC # BLD: 4.05 M/UL — LOW (ref 4.2–5.8)
RBC # FLD: 13.4 % — SIGNIFICANT CHANGE UP (ref 10.3–14.5)
SODIUM SERPL-SCNC: 137 MMOL/L — SIGNIFICANT CHANGE UP (ref 135–145)
TROPONIN I, HIGH SENSITIVITY RESULT: 13.8 NG/L — SIGNIFICANT CHANGE UP
WBC # BLD: 4.55 K/UL — SIGNIFICANT CHANGE UP (ref 3.8–10.5)
WBC # FLD AUTO: 4.55 K/UL — SIGNIFICANT CHANGE UP (ref 3.8–10.5)

## 2022-10-05 PROCEDURE — 93010 ELECTROCARDIOGRAM REPORT: CPT

## 2022-10-05 PROCEDURE — 99285 EMERGENCY DEPT VISIT HI MDM: CPT

## 2022-10-05 PROCEDURE — 71045 X-RAY EXAM CHEST 1 VIEW: CPT | Mod: 26

## 2022-10-05 RX ORDER — LISINOPRIL 2.5 MG/1
1 TABLET ORAL
Qty: 0 | Refills: 0 | DISCHARGE

## 2022-10-05 RX ORDER — TAMSULOSIN HYDROCHLORIDE 0.4 MG/1
1 CAPSULE ORAL
Qty: 0 | Refills: 0 | DISCHARGE

## 2022-10-05 RX ORDER — FAMOTIDINE 10 MG/ML
20 INJECTION INTRAVENOUS ONCE
Refills: 0 | Status: COMPLETED | OUTPATIENT
Start: 2022-10-05 | End: 2022-10-05

## 2022-10-05 RX ORDER — SODIUM CHLORIDE 9 MG/ML
1000 INJECTION INTRAMUSCULAR; INTRAVENOUS; SUBCUTANEOUS ONCE
Refills: 0 | Status: COMPLETED | OUTPATIENT
Start: 2022-10-05 | End: 2022-10-05

## 2022-10-05 RX ORDER — CARVEDILOL PHOSPHATE 80 MG/1
1 CAPSULE, EXTENDED RELEASE ORAL
Qty: 0 | Refills: 0 | DISCHARGE

## 2022-10-05 RX ORDER — CALCITRIOL 0.5 UG/1
1 CAPSULE ORAL
Qty: 0 | Refills: 0 | DISCHARGE

## 2022-10-05 RX ORDER — NIFEDIPINE 30 MG
1 TABLET, EXTENDED RELEASE 24 HR ORAL
Qty: 0 | Refills: 0 | DISCHARGE

## 2022-10-05 RX ORDER — SUCRALFATE 1 G
1 TABLET ORAL ONCE
Refills: 0 | Status: COMPLETED | OUTPATIENT
Start: 2022-10-05 | End: 2022-10-05

## 2022-10-05 RX ADMIN — Medication 1 GRAM(S): at 05:19

## 2022-10-05 RX ADMIN — FAMOTIDINE 20 MILLIGRAM(S): 10 INJECTION INTRAVENOUS at 05:20

## 2022-10-05 RX ADMIN — Medication 30 MILLILITER(S): at 05:19

## 2022-10-05 RX ADMIN — SODIUM CHLORIDE 1000 MILLILITER(S): 9 INJECTION INTRAMUSCULAR; INTRAVENOUS; SUBCUTANEOUS at 03:46

## 2022-10-05 NOTE — H&P PST ADULT - ENDOCRINE
details… Minocycline Counseling: Patient advised regarding possible photosensitivity and discoloration of the teeth, skin, lips, tongue and gums.  Patient instructed to avoid sunlight, if possible.  When exposed to sunlight, patients should wear protective clothing, sunglasses, and sunscreen.  The patient was instructed to call the office immediately if the following severe adverse effects occur:  hearing changes, easy bruising/bleeding, severe headache, or vision changes.  The patient verbalized understanding of the proper use and possible adverse effects of minocycline.  All of the patient's questions and concerns were addressed.

## 2022-10-05 NOTE — ED PROVIDER NOTE - PROGRESS NOTE DETAILS
ekg normal, trop negative, likely gerd, cxr negative, I have discussed with the patient about the ED workup, lab results, diagnostics results, plan for discharge home, need for follow-up with primary care physician/specialists, and return precautions. At this time, the patient does not require further workup in the ED. The patient is subjectively feeling better and would like to be discharged home. The patient had the opportunity to ask questions and I have answered all inquiries. The patient verbalizes understanding and agreement with the plan. The patient is hemodynamically stable, clinically well-appearing, ambulatory, mentating well and ready for discharge home.

## 2022-10-05 NOTE — ED PROVIDER NOTE - CARDIOVASCULAR [+], MLM
Chief Complaint   Patient presents with   • Hip     f/u, right hip pain, right hip bursa injection 6/27/18        Alona TODD Ella, 70 year old, female presenting for a right hip pain.   Current pain medication regiment:  Diclofenac 75 mg tid prn     Is pt currently in therapy? Yes,  [facility] [place], [address], [phone]  with Marcela Cruz   Is a dental prophylactic refill needed today?  Not needed today      Tobacco/Drug/Alcohol use verified. Latex allergy reviewed. Medications reviewed. Surgical and Medical History reviewed.     CHEST PAIN

## 2022-10-05 NOTE — ED ADULT NURSE NOTE - OBJECTIVE STATEMENT
Pt with hx of HTN presents with chest pian/ burning. Pt states he woke up with chest pain "burning". pt states he has been belching but het pain is still there. pt denies SOB, N/V/D

## 2022-10-05 NOTE — ED PROVIDER NOTE - OBJECTIVE STATEMENT
77M PMHx of HLD, HTN, CAD, MGUS, CKD STG 4, CHF, presenting with chest pain x since 7PM yesterday. Described right upper chest mild, nonradiating. intermittent. ROS: Otherwise, (-) known family history of early AMI in first degree relative < 54 y/o, (-) tearing or ripping quality, (-) diaphoresis, (-) exertional component, (-) pleuritic component, (-) positional component, (-) abdominal pain, (-) nausea/vomiting, (-) fevers/chills, (-) recent illness, (-) traumatic injury,  (-) shortness of breath, (-) coughing, (-) prior cardiac history, (-) tobacco, (-) IVDU or cocaine use.     Denies recent travel, recent surgery, immobility, extremity swelling, hemoptysis, hormone use, known personal cancer history, or personal/family history of blood clots.

## 2022-10-05 NOTE — ED ADULT NURSE NOTE - CHPI ED NUR SYMPTOMS NEG
no back pain/no chills/no congestion/no dizziness/no fever/no nausea/no shortness of breath/no vomiting

## 2022-10-05 NOTE — ED PROVIDER NOTE - NS_HEARTSCECG_ED_A_ED
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 Patients family at bedside states she's been more lethargic with associated lower extremity swelling, SOB, decreased appetite and diarrhea. Patient with previous admissions for symptomatic hyperglycemia in January. Patient currently seeing Dr. Galaviz on a regular basis for chemotherapy treatment. Last treatment on May 23rd for which she received Bendamustine and Rituximab. Patient denies any headaches, chest pain, abdominal pain, N/V, dysuria, or bowel changes.     Upon arrival to the ED, patient in moderate respiratory distress using accessory muscles, breathing w/ a RR of 30, O2 93% on room air. Patient placed on BIPAP with slight improvement in respiratory status. Patient febrile to 101 with a HR of 77. Labs significant for AG metabolic acidosis, K 7.0, Na 127, BUN 58, Cr. 2.0 (baseline < 0.9 January), Lactate 2.8. CXR done showing RLL infiltrate with pulmonary vascular congestion. In the ED, patient received Vancomycin, Zosyn, Tylenol, Calcium Gluconate 2g, Insulin 5u, Albuterol neb 5mg x 2. ICU consulted, will be transferred to MICU for management of severe sepsis 2/2 HAP and pulmonary congestion. (10 Ramon 2017 19:13)    Renal consulted for ROMEO and hyperkalemia.  Patient c/o SOB. No CP or abd pain. On BiPAP  Unable to obtain full ROS 2/2 resp distress.    History obtained from ICU housestaff.        PAST MEDICAL & SURGICAL HISTORY:  Scoliosis  Follicular lymphoma  Neck mass  Afib  Other hyperlipidemia  Breast cancer  CAD (coronary artery disease)  Diabetes  HTN (hypertension)  No pertinent past medical history  H/O abdominal hysterectomy  H/O thyroidectomy  S/P CABG x 3      MEDICATIONS:  amiodarone    Tablet 200milliGRAM(s) Oral daily  atorvastatin 80milliGRAM(s) Oral at bedtime  pantoprazole  Injectable 40milliGRAM(s) IV Push once  piperacillin/tazobactam IVPB. 2.25Gram(s) IV Intermittent every 6 hours      No pertinent family history in first degree relatives      REVIEW OF SYSTEMS:  See above    PHYSICAL EXAM:    Constitutional: T(C): 38.3, Max: 38.3 (06-10 @ 15:34)  HR: 76 (75 - 77)  BP: 126/62 (116/57 - 131/65)  RR: 24 (24 - 30)  SpO2: 99% (93% - 99%)  Wt(kg): --  General - Awake and alert. On BiPAP. Tachypenic. Using accessory muscles  HEENT - Dry MM. + JVD. No cyanosis.  CV - S1. S2. RRR (+) systolic murmur  Lungs - Diffuse crackles b/l. Poor inspiratory airflow.  Abd - Soft. NT. ND.  Ext - 1-2+ b/l LE edema      DATA:  127<L>  |  92<L>  |  58<H>  ----------------------------<  110<H>  Ca:10.3  (10 Ramon 2017 16:06)  7.0<HH>   |  19<L>  |  2.00<H>      eGFR if Non : 22 <L>  eGFR if : 26 <L>    TPro  6.6 g/dL  /  Alb  3.0 g/dL<L>  /  TBili  0.5 mg/dL  /  DBili  x      /  AST  32 U/L  /  ALT  29 U/L  /  AlkPhos  273 U/L<H>  10 Ramon 2017 16:06                        9.5<L>  8.7   )-----------( 85<L>    ( 10 Ramon 2017 16:06 )             28.5<L> Normal

## 2022-10-05 NOTE — ED PROVIDER NOTE - NSFOLLOWUPINSTRUCTIONS_ED_ALL_ED_FT
Chest Pain    Chest pain can be caused by many different conditions which may or may not be dangerous. Causes include heartburn, lung infections, heart attack, blood clot in lungs, skin infections, strain or damage to muscle, cartilage, or bones, etc. In addition to a history and physical examination, an electrocardiogram (ECG) or other lab tests may have been performed to determine the cause of your chest pain. Follow up with your primary care provider or with a cardiologist as instructed.     SEEK IMMEDIATE MEDICAL CARE IF YOU HAVE ANY OF THE FOLLOWING SYMPTOMS: worsening chest pain, coughing up blood, unexplained back/neck/jaw pain, severe abdominal pain, dizziness or lightheadedness, fainting, shortness of breath, sweaty or clammy skin, vomiting, or racing heart beat. These symptoms may represent a serious problem that is an emergency. Do not wait to see if the symptoms will go away. Get medical help right away. Call 911 and do not drive yourself to the hospital.     Take acetaminophen 650 mg orally every 6-8 hours for pain control as needed. Please do not exceed 4,000 mg of acetaminophen during a 24 hours period. Acetaminophen can be found in many over-the-counter cold medications as well as opioid medications that may be given for pain.    Take ibuprofen (also known as MOTRIN or ADVIL) 400 mg orally every 6-8 hours for pain control as needed with food to avoid an upset stomach. Ibuprofen can be found in many over-the-counter medications. Please do not take ibuprofen if you have a bleeding disorder, stomach or gastrointestinal ulcer, or liver disease.    If needed, you can alternate these medications so that you can take one medication every 3 hours. For example, at noon take ibuprofen, then at 3PM take acetaminophen, then at 6PM take ibuprofen.

## 2022-10-05 NOTE — ED PROVIDER NOTE - CLINICAL SUMMARY MEDICAL DECISION MAKING FREE TEXT BOX
Patient presenting with non-cardiac chest pain that is not consistent with acute coronary syndrome/NSTEMI based on history and absence of high-risk factors (i.e. not substernal, no exertional component, not relieved with rest,  ).  Given the timing of the pain to emergency room presentation, plan to send single troponin    to evaluate for NSTEMI.     Considered these DDx, but not consistent with presentation and unlikely: acute DVT/pulmonary embolism   pneumothorax,  thoracic aortic dissection,  cardiac effusion or tamponade,  pneumonia,  intra-abdominal pathology,  traumatic chest pathology.  PLAN:  - CBC, CMP, Troponin, PT/PTT/INR, EKG, CXR, Pain control PRN  - 325 mg aspirin, serial re-assessment     - HEART SCORE calculation w/ Disposition accordingly

## 2022-10-05 NOTE — ED PROVIDER NOTE - WR ORDER NAME 1
Left a message for Joanne informing her patient needs to have a video visit for F2F for PT orders. Asked Joanne to contact the office regarding instructions on video visit and to schedule.    MD Cristi Isaacs             [phone] Joanne at [facility], video visit for F2F         Xray Chest 1 View- PORTABLE-Urgent

## 2022-10-05 NOTE — ED PROVIDER NOTE - PATIENT PORTAL LINK FT
You can access the FollowMyHealth Patient Portal offered by [facility] by registering at the following website: [email or web address]. By joining [employer]’s FollowMyHealth portal, you will also be able to view your health information using other applications (apps) compatible with our system.

## 2022-11-03 NOTE — H&P ADULT - LYMPHATIC
02-Nov-2022 23:49 posterior cervical L/posterior cervical R/anterior cervical L/anterior cervical R/supraclavicular L/supraclavicular R

## 2023-03-01 NOTE — ED ADULT NURSE NOTE - NSIMPLEMENTINTERV_GEN_ALL_ED
----- Message from Jen Bella DO sent at 3/1/2023  9:04 AM EST -----  approved  ----- Message -----  From: Arina Verde  Sent: 2/23/2023   8:56 AM EST  To: Sleep Medicine Daniel Aguilar Provider    This Home sleep study needs approval      If approved please sign and return to clerical pool  If denied please include reasons why  Also provide alternative testing if warranted  Please sign and return to clerical pool 
Implemented All Universal Safety Interventions:  [place] to call system. Call bell, personal items and telephone within reach. Instruct patient to call for assistance. Room bathroom lighting operational. Non-slip footwear when patient is off stretcher. Physically safe environment: no spills, clutter or unnecessary equipment. Stretcher in lowest position, wheels locked, appropriate side rails in place.

## 2023-04-10 NOTE — PROGRESS NOTE ADULT - PROBLEM SELECTOR PLAN 9
I sent in oral steroids and want book with dermatology    DVT ppx: HSQ  Renal diet  Dispo: Uncertain at this time, pending PT eval Transitions of Care Status:  1.  Name of PCP: Frederic Pindea  2.  PCP Contacted on Admission: [x] Y    [ ] N    3.  PCP contacted at Discharge: [ ] Y    [ ] N    [ ] N/A  4.  Post-Discharge Appointment Date and Location:  5.  Summary of Handoff given to PCP:

## 2023-07-24 NOTE — H&P ADULT - NSHPPOAPULMEMBOLUS_GEN_A_CORE
no
Not completely clear   clozaril 100 mg/400 mg AM/PM, Depakote ??mg AM/PM, Ativan 2 mg qHS, and Invega Cxpanunc050 mg q4WKS
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
## 2023-11-17 ENCOUNTER — APPOINTMENT (OUTPATIENT)
Dept: UROLOGY | Facility: CLINIC | Age: 78
End: 2023-11-17
Payer: MEDICARE

## 2023-11-17 VITALS
HEART RATE: 78 BPM | HEIGHT: 71 IN | DIASTOLIC BLOOD PRESSURE: 69 MMHG | BODY MASS INDEX: 20.44 KG/M2 | TEMPERATURE: 98 F | SYSTOLIC BLOOD PRESSURE: 112 MMHG | WEIGHT: 146 LBS | OXYGEN SATURATION: 98 %

## 2023-11-17 DIAGNOSIS — R35.0 FREQUENCY OF MICTURITION: ICD-10-CM

## 2023-11-17 PROCEDURE — 99214 OFFICE O/P EST MOD 30 MIN: CPT

## 2023-11-20 LAB
APPEARANCE: CLEAR
BACTERIA UR CULT: NORMAL
BACTERIA: NEGATIVE /HPF
BILIRUBIN URINE: NEGATIVE
BLOOD URINE: NEGATIVE
CAST: 1 /LPF
COLOR: YELLOW
EPITHELIAL CELLS: 0 /HPF
GLUCOSE QUALITATIVE U: NEGATIVE MG/DL
KETONES URINE: NEGATIVE MG/DL
LEUKOCYTE ESTERASE URINE: ABNORMAL
MICROSCOPIC-UA: NORMAL
NITRITE URINE: NEGATIVE
PH URINE: 6
PROTEIN URINE: NEGATIVE MG/DL
RED BLOOD CELLS URINE: 2 /HPF
SPECIFIC GRAVITY URINE: 1.02
UROBILINOGEN URINE: 0.2 MG/DL
WHITE BLOOD CELLS URINE: 0 /HPF

## 2023-12-04 ENCOUNTER — RESULT REVIEW (OUTPATIENT)
Age: 78
End: 2023-12-04

## 2023-12-04 ENCOUNTER — APPOINTMENT (OUTPATIENT)
Dept: MRI IMAGING | Facility: CLINIC | Age: 78
End: 2023-12-04
Payer: MEDICARE

## 2023-12-04 PROCEDURE — 72197 MRI PELVIS W/O & W/DYE: CPT

## 2023-12-04 PROCEDURE — 76498P: CUSTOM

## 2023-12-04 PROCEDURE — 71046 X-RAY EXAM CHEST 2 VIEWS: CPT

## 2023-12-15 ENCOUNTER — APPOINTMENT (OUTPATIENT)
Dept: UROLOGY | Facility: CLINIC | Age: 78
End: 2023-12-15
Payer: MEDICARE

## 2023-12-15 VITALS
WEIGHT: 146 LBS | HEART RATE: 86 BPM | OXYGEN SATURATION: 97 % | TEMPERATURE: 96.8 F | BODY MASS INDEX: 20.44 KG/M2 | DIASTOLIC BLOOD PRESSURE: 76 MMHG | SYSTOLIC BLOOD PRESSURE: 126 MMHG | HEIGHT: 71 IN

## 2023-12-15 DIAGNOSIS — N50.89 OTHER SPECIFIED DISORDERS OF THE MALE GENITAL ORGANS: ICD-10-CM

## 2023-12-15 PROCEDURE — 99214 OFFICE O/P EST MOD 30 MIN: CPT

## 2023-12-18 ENCOUNTER — NON-APPOINTMENT (OUTPATIENT)
Age: 78
End: 2023-12-18

## 2023-12-19 PROBLEM — N50.89 SCROTAL SWELLING: Status: ACTIVE | Noted: 2022-03-29

## 2023-12-19 NOTE — ASSESSMENT
[FreeTextEntry1] : mri images reviewed Reviewed options cont to follow psa, biopsy and mri Risk sand benefits of each reviewed The risks of the biopsy procedure including but not limited to sepsis, hemorrhage,hematuria, hematochezia rectal pain, hematospermia, false positives andfalse negatives, possible need of repeat biopsies, and rectal injury were all discussed, understood, and accepted by the patient.  Will schedule for fusion biopsy   reviewed sono images with pt  reviewed options  observation aspiration and escision  will observe for now

## 2023-12-19 NOTE — HISTORY OF PRESENT ILLNESS
[FreeTextEntry1] : Elevated PSA  Patient is here with an elevated PSA. He has no personal history and no family history of prostate cancer.He has no prior genitourinary history of hematuria, hematospermia, prostatitis, UTI, erectile dysfunction, urolithiasis, epididymal orchitis.  C/o frequency nocturia x4  empties well takes tamsulosin No dysuria or hematuria psa4.38 cr 2.22  mri pirad 4

## 2023-12-20 ENCOUNTER — APPOINTMENT (OUTPATIENT)
Dept: UROLOGY | Facility: CLINIC | Age: 78
End: 2023-12-20
Payer: MEDICARE

## 2023-12-20 VITALS
OXYGEN SATURATION: 98 % | HEART RATE: 76 BPM | SYSTOLIC BLOOD PRESSURE: 127 MMHG | WEIGHT: 146 LBS | HEIGHT: 71 IN | BODY MASS INDEX: 20.44 KG/M2 | DIASTOLIC BLOOD PRESSURE: 72 MMHG | TEMPERATURE: 97.2 F

## 2023-12-20 DIAGNOSIS — R97.20 ELEVATED PROSTATE, SPECIFIC ANTIGEN [PSA]: ICD-10-CM

## 2023-12-20 PROCEDURE — 76942 ECHO GUIDE FOR BIOPSY: CPT | Mod: 59

## 2023-12-20 PROCEDURE — 55700: CPT | Mod: 22

## 2023-12-20 PROCEDURE — 76377 3D RENDER W/INTRP POSTPROCES: CPT

## 2024-01-10 ENCOUNTER — APPOINTMENT (OUTPATIENT)
Dept: UROLOGY | Facility: CLINIC | Age: 79
End: 2024-01-10
Payer: MEDICARE

## 2024-01-10 VITALS
BODY MASS INDEX: 20.44 KG/M2 | SYSTOLIC BLOOD PRESSURE: 137 MMHG | TEMPERATURE: 97.5 F | WEIGHT: 146 LBS | DIASTOLIC BLOOD PRESSURE: 81 MMHG | OXYGEN SATURATION: 98 % | HEIGHT: 71 IN | HEART RATE: 69 BPM

## 2024-01-10 LAB — PROSTATE BIOPSY: NORMAL

## 2024-01-10 PROCEDURE — 99214 OFFICE O/P EST MOD 30 MIN: CPT

## 2024-01-10 NOTE — ASSESSMENT
[FreeTextEntry1] : We had a long discussion with the patient explaining the diagnosis of prostate cancer and the meaning of his Geovani Grade. We discussed all options of therapy, including observation, radiation therapy, endocrine therapy, and radical surgical extirpation. We discussed tumor progression and the natural course of prostate cancer, including metastases, especially to bone, and that observation would allow his prostate cancer to progress. We discussed radiation therapy, both external beam and interstitial radioactive seeds, and the morbidity involved, including cystitis, proctitis, urinary and incontinence and erectile dysfunction. We discussed endocrine therapy, that it would likely slow but not stop cancer progression, and the results of androgen ablation, including but not limited to bone density loss, muscle mass loss, changes in libido, mood, energy and depression. We discussed surgical therapy, including surgical techniques, outcomes and potential need for subsequent additional radiation therapy if positive surgical margins, and complications including infection and blood loss, and long term complications including urinary incontinence and erectile dysfunction.Staging will be performed with psma scan       Gave him material from Prostate cancer [employer] for review. Appropriate referral were made. reviewed importance of following up with all referrals and exams   Greater than 50% of the encounter time was spent counseling the patient and I have spent 30 minutes face to face time with the patient. All questions were answered.

## 2024-01-12 LAB
HCT VFR BLD CALC: 30.8 %
HGB BLD-MCNC: 8.5 G/DL
MCHC RBC-ENTMCNC: 20.3 PG
MCHC RBC-ENTMCNC: 27.6 GM/DL
MCV RBC AUTO: 73.7 FL
PLATELET # BLD AUTO: 365 K/UL
RBC # BLD: 4.18 M/UL
RBC # FLD: 17.8 %
WBC # FLD AUTO: 4.55 K/UL

## 2024-02-02 ENCOUNTER — EMERGENCY (EMERGENCY)
Facility: HOSPITAL | Age: 79
LOS: 0 days | Discharge: HOME | End: 2024-02-02
Attending: EMERGENCY MEDICINE
Payer: MEDICARE

## 2024-02-02 VITALS
HEART RATE: 83 BPM | TEMPERATURE: 98 F | OXYGEN SATURATION: 98 % | RESPIRATION RATE: 18 BRPM | DIASTOLIC BLOOD PRESSURE: 93 MMHG | SYSTOLIC BLOOD PRESSURE: 149 MMHG

## 2024-02-02 VITALS
DIASTOLIC BLOOD PRESSURE: 89 MMHG | HEART RATE: 100 BPM | HEIGHT: 70 IN | OXYGEN SATURATION: 98 % | RESPIRATION RATE: 18 BRPM | WEIGHT: 149.91 LBS | SYSTOLIC BLOOD PRESSURE: 157 MMHG | TEMPERATURE: 98 F

## 2024-02-02 DIAGNOSIS — Z98.890 OTHER SPECIFIED POSTPROCEDURAL STATES: Chronic | ICD-10-CM

## 2024-02-02 DIAGNOSIS — R35.0 FREQUENCY OF MICTURITION: ICD-10-CM

## 2024-02-02 DIAGNOSIS — Z86.2 PERSONAL HISTORY OF DISEASES OF THE BLOOD AND BLOOD-FORMING ORGANS AND CERTAIN DISORDERS INVOLVING THE IMMUNE MECHANISM: ICD-10-CM

## 2024-02-02 DIAGNOSIS — D63.1 ANEMIA IN CHRONIC KIDNEY DISEASE: ICD-10-CM

## 2024-02-02 DIAGNOSIS — N18.9 CHRONIC KIDNEY DISEASE, UNSPECIFIED: ICD-10-CM

## 2024-02-02 DIAGNOSIS — R00.2 PALPITATIONS: ICD-10-CM

## 2024-02-02 DIAGNOSIS — I12.9 HYPERTENSIVE CHRONIC KIDNEY DISEASE WITH STAGE 1 THROUGH STAGE 4 CHRONIC KIDNEY DISEASE, OR UNSPECIFIED CHRONIC KIDNEY DISEASE: ICD-10-CM

## 2024-02-02 DIAGNOSIS — E78.5 HYPERLIPIDEMIA, UNSPECIFIED: ICD-10-CM

## 2024-02-02 LAB
ALBUMIN SERPL ELPH-MCNC: 3.4 G/DL — SIGNIFICANT CHANGE UP (ref 3.3–5)
ALP SERPL-CCNC: 68 U/L — SIGNIFICANT CHANGE UP (ref 40–120)
ALT FLD-CCNC: 23 U/L — SIGNIFICANT CHANGE UP (ref 12–78)
ANION GAP SERPL CALC-SCNC: 5 MMOL/L — SIGNIFICANT CHANGE UP (ref 5–17)
APPEARANCE UR: CLEAR — SIGNIFICANT CHANGE UP
APTT BLD: 33.3 SEC — SIGNIFICANT CHANGE UP (ref 24.5–35.6)
AST SERPL-CCNC: 22 U/L — SIGNIFICANT CHANGE UP (ref 15–37)
BASOPHILS # BLD AUTO: 0.04 K/UL — SIGNIFICANT CHANGE UP (ref 0–0.2)
BASOPHILS NFR BLD AUTO: 0.6 % — SIGNIFICANT CHANGE UP (ref 0–2)
BILIRUB SERPL-MCNC: 0.7 MG/DL — SIGNIFICANT CHANGE UP (ref 0.2–1.2)
BILIRUB UR-MCNC: NEGATIVE — SIGNIFICANT CHANGE UP
BUN SERPL-MCNC: 24 MG/DL — HIGH (ref 7–23)
CALCIUM SERPL-MCNC: 8.7 MG/DL — SIGNIFICANT CHANGE UP (ref 8.5–10.1)
CHLORIDE SERPL-SCNC: 101 MMOL/L — SIGNIFICANT CHANGE UP (ref 96–108)
CO2 SERPL-SCNC: 28 MMOL/L — SIGNIFICANT CHANGE UP (ref 22–31)
COLOR SPEC: YELLOW — SIGNIFICANT CHANGE UP
CREAT SERPL-MCNC: 1.51 MG/DL — HIGH (ref 0.5–1.3)
DIFF PNL FLD: NEGATIVE — SIGNIFICANT CHANGE UP
EGFR: 47 ML/MIN/1.73M2 — LOW
ELLIPTOCYTES BLD QL SMEAR: SLIGHT — SIGNIFICANT CHANGE UP
EOSINOPHIL # BLD AUTO: 0.04 K/UL — SIGNIFICANT CHANGE UP (ref 0–0.5)
EOSINOPHIL NFR BLD AUTO: 0.6 % — SIGNIFICANT CHANGE UP (ref 0–6)
GLUCOSE SERPL-MCNC: 87 MG/DL — SIGNIFICANT CHANGE UP (ref 70–99)
GLUCOSE UR QL: NEGATIVE MG/DL — SIGNIFICANT CHANGE UP
HCT VFR BLD CALC: 29.7 % — LOW (ref 39–50)
HGB BLD-MCNC: 8.5 G/DL — LOW (ref 13–17)
HYPOCHROMIA BLD QL: SLIGHT — SIGNIFICANT CHANGE UP
IMM GRANULOCYTES NFR BLD AUTO: 0.3 % — SIGNIFICANT CHANGE UP (ref 0–0.9)
INR BLD: 0.86 RATIO — SIGNIFICANT CHANGE UP (ref 0.85–1.18)
KETONES UR-MCNC: NEGATIVE MG/DL — SIGNIFICANT CHANGE UP
LEUKOCYTE ESTERASE UR-ACNC: NEGATIVE — SIGNIFICANT CHANGE UP
LG PLATELETS BLD QL AUTO: SLIGHT — SIGNIFICANT CHANGE UP
LYMPHOCYTES # BLD AUTO: 1.66 K/UL — SIGNIFICANT CHANGE UP (ref 1–3.3)
LYMPHOCYTES # BLD AUTO: 24.3 % — SIGNIFICANT CHANGE UP (ref 13–44)
MANUAL SMEAR VERIFICATION: SIGNIFICANT CHANGE UP
MCHC RBC-ENTMCNC: 19.8 PG — LOW (ref 27–34)
MCHC RBC-ENTMCNC: 28.6 G/DL — LOW (ref 32–36)
MCV RBC AUTO: 69.2 FL — LOW (ref 80–100)
MICROCYTES BLD QL: SLIGHT — SIGNIFICANT CHANGE UP
MONOCYTES # BLD AUTO: 0.77 K/UL — SIGNIFICANT CHANGE UP (ref 0–0.9)
MONOCYTES NFR BLD AUTO: 11.3 % — SIGNIFICANT CHANGE UP (ref 2–14)
NEUTROPHILS # BLD AUTO: 4.3 K/UL — SIGNIFICANT CHANGE UP (ref 1.8–7.4)
NEUTROPHILS NFR BLD AUTO: 62.9 % — SIGNIFICANT CHANGE UP (ref 43–77)
NITRITE UR-MCNC: NEGATIVE — SIGNIFICANT CHANGE UP
NRBC # BLD: 0 /100 WBCS — SIGNIFICANT CHANGE UP (ref 0–0)
PH UR: 7 — SIGNIFICANT CHANGE UP (ref 5–8)
PLAT MORPH BLD: ABNORMAL
PLATELET # BLD AUTO: 269 K/UL — SIGNIFICANT CHANGE UP (ref 150–400)
POIKILOCYTOSIS BLD QL AUTO: SLIGHT — SIGNIFICANT CHANGE UP
POTASSIUM SERPL-MCNC: 3.5 MMOL/L — SIGNIFICANT CHANGE UP (ref 3.5–5.3)
POTASSIUM SERPL-SCNC: 3.5 MMOL/L — SIGNIFICANT CHANGE UP (ref 3.5–5.3)
PROT SERPL-MCNC: 7.6 GM/DL — SIGNIFICANT CHANGE UP (ref 6–8.3)
PROT UR-MCNC: NEGATIVE MG/DL — SIGNIFICANT CHANGE UP
PROTHROM AB SERPL-ACNC: 10.3 SEC — SIGNIFICANT CHANGE UP (ref 9.5–13)
RBC # BLD: 4.29 M/UL — SIGNIFICANT CHANGE UP (ref 4.2–5.8)
RBC # FLD: 17.8 % — HIGH (ref 10.3–14.5)
RBC BLD AUTO: ABNORMAL
SCHISTOCYTES BLD QL AUTO: SLIGHT — SIGNIFICANT CHANGE UP
SODIUM SERPL-SCNC: 134 MMOL/L — LOW (ref 135–145)
SP GR SPEC: 1.01 — SIGNIFICANT CHANGE UP (ref 1–1.03)
TROPONIN I, HIGH SENSITIVITY RESULT: 30 NG/L — SIGNIFICANT CHANGE UP
TSH SERPL-MCNC: 1.01 UU/ML — SIGNIFICANT CHANGE UP (ref 0.36–3.74)
UROBILINOGEN FLD QL: 0.2 MG/DL — SIGNIFICANT CHANGE UP (ref 0.2–1)
WBC # BLD: 6.83 K/UL — SIGNIFICANT CHANGE UP (ref 3.8–10.5)
WBC # FLD AUTO: 6.83 K/UL — SIGNIFICANT CHANGE UP (ref 3.8–10.5)

## 2024-02-02 PROCEDURE — 99285 EMERGENCY DEPT VISIT HI MDM: CPT

## 2024-02-02 RX ORDER — SODIUM CHLORIDE 9 MG/ML
1000 INJECTION INTRAMUSCULAR; INTRAVENOUS; SUBCUTANEOUS ONCE
Refills: 0 | Status: COMPLETED | OUTPATIENT
Start: 2024-02-02 | End: 2024-02-02

## 2024-02-02 RX ADMIN — SODIUM CHLORIDE 1000 MILLILITER(S): 9 INJECTION INTRAMUSCULAR; INTRAVENOUS; SUBCUTANEOUS at 06:41

## 2024-02-02 NOTE — ED PROVIDER NOTE - OBJECTIVE STATEMENT
78 year old male with PMH of HTN, BPH, CKD, HLD, anemia otherwise presenting to ED due to palpitation symptoms noted last night. Urinary frequency also noted. 78 year old male with PMH of HTN, BPH, CKD, HLD, anemia otherwise presenting to ED due to palpitation symptoms noted last night. Urinary frequency also noted. Pt states otherwise no CP or SOB and palpitations on and off last night - currently no palpitations while laying down. No leg edema noted

## 2024-02-02 NOTE — ED ADULT NURSE REASSESSMENT NOTE - NS ED NURSE REASSESS COMMENT FT1
Received report from night RN. Patient awake, alert and hemodynamically stable. Patient's wife and daughter at bedside.

## 2024-02-02 NOTE — ED ADULT NURSE NOTE - OBJECTIVE STATEMENT
Pt arrived at ed via ambulance. Pt c/o palpitation and tachycardic as per ems. Pmhx HTN, prostate CA. Stage 4 CKD, BPH. Pt denies sob, dizziness numbnes. NKDA Pt arrived at ed via ambulance. Pt c/o palpitation and tachycardic as per ems.  at triage, Pmhx HTN, prostate CA. Stage 4 CKD, BPH. Pt denies sob, dizziness numbnes or any pain. NKDA

## 2024-02-02 NOTE — ED PROVIDER NOTE - EYES, MLM
"Anesthesia Post Evaluation    Patient: Marina Esposito    Procedure(s) Performed: Procedure(s) (LRB):  PHACOEMULSIFICATION-ASPIRATION-CATARACT (Left)  INSERTION-INTRAOCULAR LENS (IOL) (Left)    Final Anesthesia Type: MAC  Patient location during evaluation: [facility]  Patient participation: Yes- Able to Participate  Level of consciousness: awake and alert, awake and oriented  Post-procedure vital signs: reviewed and stable  Pain management: adequate  Airway patency: patent  PONV status at discharge: No PONV  Anesthetic complications: no      Cardiovascular status: hemodynamically stable  Respiratory status: unassisted, spontaneous ventilation and room air  Hydration status: euvolemic  Follow-up not needed.        Visit Vitals  BP (!) 144/76 (BP Location: Left arm, Patient Position: Lying)   Pulse 67   Temp 36.8 °C (98.2 °F) (Oral)   Resp 18   Ht 5' 5" (1.651 m)   Wt 122.5 kg (270 lb)   SpO2 98%   Breastfeeding? No   BMI 44.93 kg/m²       Pain/Manjinder Score: Pain Assessment Performed: Yes (11/13/2017  6:25 AM)  Presence of Pain: denies (11/13/2017  6:25 AM)      " Clear bilaterally, pupils equal, round and reactive to light.

## 2024-02-02 NOTE — ED ADULT TRIAGE NOTE - PATIENT ON (OXYGEN DELIVERY METHOD)
room air Alcohol Abuse  past history  Atrial fibrillation  paroxysmal atrial fibrillation - on xarelto  BPH (benign prostatic hyperplasia)    Depression    HTN (Hypertension)    Hyperlipidemia    Malignant melanoma  2005 - behind right ear; current - left scalp

## 2024-02-02 NOTE — ED ADULT NURSE NOTE - NS TRANSFER PATIENT BELONGINGS
Physical Therapy    Visit Type: treatment  Precautions:  Medical precautions:  fall risk; standard precautions.  H/o breast cancer s/p mastectomy, CKD Stage 3, HTN, HLP, GERD, anemia, GI beed    Pt presented to ED with c/o dark stools, chest pain, LLQ pain associated with severe nausea, and diarrhea.  Lines:       Lines in chart and on patient reviewed, precautions maintained throughout session.  Safety Measures: bed alarm and bed rails  SUBJECTIVE  Patient agreed to participate in therapy this date.  \"I was just about to sleep\"  Patient / Family Goal: maximize function, return home and return to previous functional status    Pain   RN informed on pain level     OBJECTIVE     Cognitive Status   Orientation    - Oriented to: person, place, time and situation  Functional Communication   - Overall Status: within functional limits    Patient Activity Tolerance: 1 to 2 activity to rest       Sitting Balance  (BLAKE = base of support)  Static      - Trial 1 details: supervision    Standing Balance  (BLAKE = base of support)  Firm Surface: Double Leg      - Static, Eyes Open       - Trial 1 details: supervision and with double UE support       Bed Mobility  - Supine to sit: supervision  - Sit to supine: supervision  -Increased time  Transfers  Assistive devices: 2-wheeled walker  - Sit to stand: stand by assist, with verbal cues (x2 trials )  - Stand to sit: stand by assist, with verbal cues (x2 trials )  - Stand pivot: stand by assist  -Cues for hand/foot placement       Ambulation / Gait  - Assistive device: two-wheeled walker  - Distance (feet unless otherwise indicated): 35  - Assist Level: stand by assist and with verbal cues  - Surface: even  - Description: decreased alan/pace    -Slow but steady gait with no occurrence of LOB   -Frequent standing rest breaks d/t fatigue      Interventions     Training provided: activity tolerance, transfer training, gait training, use of assistive device, body mechanics, functional  ambulation, safety training and balance retraining    Skilled input: Verbal instruction/cues and posture correction  Verbal Consent: Writer verbally educated and received verbal consent for hand placement, positioning of patient, and techniques to be performed today from patient for clothing adjustments for techniques and hand placement and palpation for techniques as described above and how they are pertinent to the patient's plan of care.         ASSESSMENT   Impairments: strength, activity tolerance, balance deficits, pain and balance  Functional Limitations: ambulation and sit to/from stand transfers       Discharge Recommendations  Recommendation for Discharge: PT IL: Patient requires  intermittent assistance to perform mobility and/or ADLs safely, Patient is appropriate for Physical Therapy 1-3 times per week (resume HH)  PT/OT Mobility Equipment for Discharge: pt owns RW  PT/OT ADL Equipment for Discharge: none        Pain at End of Session:   Pain: 0/10    Progress: progressing toward goals    • Skilled therapy is required to address these limitations in attempt to maximize the patient's independence.    Education:   - Present and ready to learn: patient  Education provided during session:  - Pt education regarding role of PT in acute care setting, PT POC, PT goals, activity progression, benefits of daily activity, hazards of immobility, d/c recs, DME, home safety, and decreasing fall risk. All questions and concerns were addressed.  - Results of above outlined education: Verbalizes understanding and Demonstrates understanding    Patient at End of Session:   Location: in bed (declined chair )  Safety measures: call light within reach, equipment intact, lines intact and bed rails x3  Handoff to: nurse    PLAN   Suggestions for next session as indicated:       Interventions: balance, bed mobility, body mechanics, strengthening, gait training, HEP train/position, safety education, functional transfer training,  endurance training, compensatory technique education and energy conservation  Agreement to plan and goals: patient agrees with goals and treatment plan        GOALS  Review Date: 1/24/2023  Long Term Goals: (to be met by time of discharge from hospital)  Supine to sit: Patient will complete supine to sit modified independent.  Status: progressing/ongoing  Sit to stand: Patient will complete sit to stand transfer with least restrictive device, modified independent.   Status: progressing/ongoing  Stand to sit: Patient will complete stand to sit transfer with least restrictive device, modified independent.   Status: progressing/ongoing  Stand pivot: Patient will complete stand pivot transfer with least restrictive device, modified independent.   Status: progressing/ongoing  Ambulation (even): Patient will ambulate on even surface for 180 feet with least restrictive device, supervision.   Status: progressing/ongoing    Documented in the chart in the following areas: Assessment.      Therapy procedure time and total treatment time can be found documented on the Time Entry flowsheet   Clothing

## 2024-02-02 NOTE — ED PROVIDER NOTE - NSFOLLOWUPINSTRUCTIONS_ED_ALL_ED_FT
Please follow up with your doctor for hemoglobin level return If bloody urine or dark stool or persist or worsening heart racing

## 2024-02-02 NOTE — ED PROVIDER NOTE - CLINICAL SUMMARY MEDICAL DECISION MAKING FREE TEXT BOX
pt with palpitations, EKG sinus, no ST elevation/depression - otherwise signed out to Dr Joy pending labwork and UA without noted UTI.

## 2024-02-02 NOTE — ED PROVIDER NOTE - PROGRESS NOTE DETAILS
Pt was seen and treated by Dr. Samuel Pt was c/o heart racing and urinary frequency Pt is speaking in clear full sentences appears very comfortable bed side heart rate 82, bp 145/62 rr 16 pox 98% in room air UA is normal. Pt denies headache, dizziness, blurred visions, light sensitivities, focal/distal weakness or numbness, cough, sob, chest pain, nausea, vomiting, abd pain fever, chills. pt's h/h are 8.5/29.7 pt sts he usually has hemoglobin in 7. pt's anemia is due to CKD, Pt's t rop and tsh are normal. bun/cr are 24/1.51 today. Pt denies he has bleeding from his urination or bowel movements ( normal color of stool). Pt is advised to follow up with  his pcp for H/H and return if symptoms persist or worsen.

## 2024-02-02 NOTE — ED PROVIDER NOTE - PATIENT PORTAL LINK FT
You can access the FollowMyHealth Patient Portal offered by [facility] by registering at the following website: [email or web address]. By joining [employer]’s FollowMyHealth portal, you will also be able to view your health information using other applications (apps) compatible with our system.

## 2024-02-02 NOTE — ED PROVIDER NOTE - INTERNATIONAL TRAVEL
[facility] Progress Note      DOS:   2020  NAME:  Eriberto Yanez   MRN:   [id number]   REFERRING PROVIDER:  Jeronimo Sapp NP  PRIMARY CARE PHYSICIAN: Jeronimo Sapp NP  PRIMARY CARDIOLOGIST: none      Chief Complaint:   Chief Complaint   Patient presents with    Shortness of Breath       HPI: 70y.o. year old female with a history of HTN, HLD, WES, obesity (Body mass index is 39.56 kg/m².) s/p gastric bypass in , T2DM, hypothyroidism, COPD, CAD s/p CABG in , s/p PCI to [address] in 5/15, ICM, VT, s/p AICD (Medtronic),  anxiety, and depression who presents for further evaluation of her chronic systolic heart failure. Ms. Abelardo Saeed recalls having a viral syndrome in the Fall and has not felt well since then. She uses oxygen with her BiPAP every night and sleeps on two pillows. Her activity level is diminished. She finds herself short of breath with simply talking. In walking to the clinic, she had to stop several times to catch her breath. She denies palpitations, presyncope, and syncope. She has been admitted to [facility] for further evaluation and treatment of her HFrEF.      Interrogation of AICD (Medtronic) 20:  1 episode of NSVT - 2020  Patient Activity 0.8 hour/day  Total Ventricular Pacing < 0.1%  High OptiVol Fluid Index - above threshold    History:  Past Medical History:   Diagnosis Date    Chronic obstructive pulmonary disease ([address]     Congestive heart failure ([address]     Diabetes ([address]     Hypertension     Sleep apnea      Past Surgical History:   Procedure Laterality Date    CARDIAC SURG PROCEDURE UNLIST  2018    cardiac cath - Left    HX ARTHROPLASTY  2105    knee    HX  SECTION      HX CORONARY ARTERY BYPASS GRAFT  1997    HX CORONARY STENT PLACEMENT  2016    HX HERNIA REPAIR      HX IMPLANTABLE CARDIOVERTER DEFIBRILLATOR      HX ORTHOPAEDIC      LAP GASTRIC BYPASS/KERLINE-EN-Y   lap band     Social History     Socioeconomic History    Marital status:      Spouse name: Not on file    Number of children: Not on file    Years of education: Not on file    Highest education level: Not on file   Occupational History    Not on file   Social Needs    Financial resource strain: Not on file    Food insecurity:     Worry: Not on file     Inability: Not on file    Transportation needs:     Medical: Not on file     Non-medical: Not on file   Tobacco Use    Smoking status: Former Smoker     Types: Cigarettes    Smokeless tobacco: Never Used   Substance and Sexual Activity    Alcohol use: Not Currently    Drug use: Not Currently    Sexual activity: Not Currently   Lifestyle    Physical activity:     Days per week: Not on file     Minutes per session: Not on file    Stress: Not on file   Relationships    Social connections:     Talks on phone: Not on file     Gets together: Not on file     Attends Anabaptist service: Not on file     Active member of club or organization: Not on file     Attends meetings of clubs or organizations: Not on file     Relationship status: Not on file    Intimate partner violence:     Fear of current or ex partner: Not on file     Emotionally abused: Not on file     Physically abused: Not on file     Forced sexual activity: Not on file   Other Topics Concern    Not on file   Social History Narrative    Not on file     History reviewed. No pertinent family history.     Current Medications:   Current Facility-Administered Medications   Medication Dose Route Frequency Provider Last Rate Last Dose    cholecalciferol (VITAMIN D3) (1000 Units /25 mcg) tablet 2 Tab  2,000 Units Oral DAILY Andrew Blankenship, NP   2 Tab at 02/01/20 1006    bumetanide (BUMEX) injection 2 mg  2 mg IntraVENous BID PolliardAndrewal, NP   2 mg at 02/01/20 1005    ezetimibe (ZETIA) tablet 10 mg  10 mg Oral DAILY PolliarAndrew jones, NP   10 mg at 02/01/20 1006    lidocaine (LIDODERM) 5 % patch 1 Patch  1 Patch TransDERmal Q24H Kaylee Blankenship, NP   1 Patch at 01/31/20 1807    spironolactone (ALDACTONE) tablet 12.5 mg  12.5 mg Oral DAILY Kaylee Blankenship, NP   12.5 mg at 02/01/20 1006    milrinone (PRIMACOR) 20 MG/100 ML D5W infusion  0.125 mcg/kg/min IntraVENous CONTINUOUS Diana Blankenship NP 3.7 mL/hr at 02/01/20 0803 0.125 mcg/kg/min at 02/01/20 0803    acetaminophen (TYLENOL) tablet 1,000 mg  1,000 mg Oral BID Alex Farley NP   1,000 mg at 02/01/20 1006    allopurinoL (ZYLOPRIM) tablet 100 mg  100 mg Oral DAILY Alex Farley NP   100 mg at 02/01/20 1005    aspirin chewable tablet 81 mg  81 mg Oral DAILY Alex Farley NP   81 mg at 02/01/20 1006    atorvastatin (LIPITOR) tablet 80 mg  80 mg Oral QHS Alex Farley NP   80 mg at 01/31/20 2106    buPROPion SR (WELLBUTRIN SR) tablet 150 mg  150 mg Oral DAILY Alex Farley NP   150 mg at 02/01/20 1006    clopidogreL (PLAVIX) tablet 75 mg  75 mg Oral DAILY Alex Farley NP   75 mg at 02/01/20 1006    gabapentin (NEURONTIN) tablet 600 mg  600 mg Oral TID Alex Farley NP   600 mg at 02/01/20 1006    levothyroxine (SYNTHROID) tablet 100 mcg  100 mcg Oral ACB Alex Farley NP   100 mcg at 02/01/20 4032    losartan (COZAAR) tablet 50 mg  50 mg Oral DAILY Alex Farley NP   50 mg at 02/01/20 1006    [Held by provider] metFORMIN (GLUCOPHAGE) tablet 500 mg  500 mg Oral BID WITH MEALS Alex Farley NP   Stopped at 01/31/20 1700    [Held by provider] metoprolol succinate (TOPROL-XL) XL tablet 25 mg  25 mg Oral DAILY Alex Farley NP        docusate sodium (COLACE) capsule 100 mg  100 mg Oral PRN Alex Farley NP   100 mg at 01/31/20 2106    sodium chloride (NS) flush 5-40 mL  5-40 mL IntraVENous Q8H Alex ROSE NP   10 mL at 02/01/20 7379    sodium chloride (NS) flush 5-40 mL  5-40 mL IntraVENous PRN Kenyetta Maria, NP           Allergies: Allergies   Allergen Reactions    Crestor [Rosuvastatin] Other (comments)     Causes muscle cramps    Lisinopril Cough       ROS:    General:  positive for  - fatigue  HEENT: positive for cataracts and wears glasses  Pulmonary: positive for - cough, orthopnea and shortness of breath  Cardiac: positive for dyspnea, fatigue, orthopnea, paroxysmal nocturnal dyspnea, lower extremity edema, dyspnea on exertion  GI:  positive for - constipation, nausea/vomiting and anorexia  Musculo: positive for - muscular weakness  Neuro: no TIA or stroke symptoms  Skin:  negative  Allergies: REMINDER:DOCUMENT ALLERGIES IN ALLERGY ACTIVITY  Psych: positive for - anxiety and depression    Admission Weight: Last Weight   Weight: 215 lb 13.3 oz (97.9 kg) Weight: 216 lb 4.3 oz (98.1 kg)       Physical Exam:   Vitals:    Visit Vitals  /68 (BP 1 Location: Right arm, BP Patient Position: Standing)   Pulse 84   Temp 97.8 °F (36.6 °C)   Resp 18   Ht 5' 2\" (1.575 m)   Wt 216 lb 4.3 oz (98.1 kg)   SpO2 99%   BMI 39.56 kg/m²       General:  fatigued, cooperative  HEENT: PERRLA, EOMI, Sclera clear, anicteric and Oropharynx clear, no lesions  Neck:  supple, no significant adenopathy, carotids upstroke normal bilaterally, no bruits  CVP:  10 cm  ( + ) HJR  Cardiac: regular rate, regular rhythm, S1, S2 normal, S4 present and systolic murmur present  Chest: breath sounds clear and equal bilaterally  Abdomen: soft, non-tender. Bowel sounds normal. No masses, mild hepatomegaly. Extremity: edema trace bilaterally  Neuro: Alert and oriented to person, place, and time; normal strength and tone. Normal symmetric reflexes. Normal coordination and gait  Skin:   no rashes, no ecchymoses, no petechiae    Recent Labs:   Labs Latest Ref Rng & Units 2/1/2020 1/31/2020 1/30/2020   WBC 3.6 - 11.0 K/uL 5.5 6.4 7.9   RBC 3.80 - 5.20 M/uL 3.19(L) 3.38(L) 3.24(L)   Hemoglobin 11.5 - 16.0 g/dL 10. 0(L) 10. 7(L) 10.1(L)   Hematocrit 35.0 - 47.0 % 31. 2(L) 33. 7(L) 31. 9(L)   MCV 80.0 - 99.0 FL 97.8 99. 7(H) 98.5   Platelets 704 - 083 K/uL 237 268 263   Lymphocytes 12 - 49 % - - 29   Monocytes 5 - 13 % - - 6   Eosinophils 0 - 7 % - - 4   Basophils 0 - 1 % - - 1   Albumin 3.5 - 5.0 g/dL 3.2(L) 3.9 3.6   Calcium 8.5 - 10.1 MG/DL 10.0 10.0 9.9   SGOT 15 - 37 U/L 18 28 25   Glucose 65 - 100 mg/dL 183(H) 140(H) 138(H)   BUN 6 - 20 MG/DL 44(H) 37(H) 40(H)   Creatinine 0.55 - 1.02 MG/DL 2.08(H) 1.95(H) 1.98(H)   Sodium 136 - 145 mmol/L 136 138 137   Potassium 3.5 - 5.1 mmol/L 4.0 4.1 4.2   TSH 0.36 - 3.74 uIU/mL - 1.07 -   Some recent data might be hidden        EK2020  Sinus  Rhythm, rate 67 bpm   -  Nonspecific T-abnormality. Echocardiogram:   2019 at [facility]  LVEF 40-45% with mild lateral wall and septal wall HK  Mod LAE  Grade II diastolic dysfunction  Mod MR  Mild to mod TR with RVSP 36-45 mmHg    Cardiac Catheterization:   2018 ([facility])  Severe native 3vd  Patent LIMA-LAd, patent SVG-PDA  LVEDP 18 mmHg    5/19/15 ([facility])  Significant 3vd  Patent SVG-PDA with lesion in distal segment of graft  Patent LIMA-LAD  Mod pulm HTN  Depressed CI    PCI to distal SVG-PDA  Integrity BMS 3x9    RA 5, RV 55/11, PA 53//31, PCWP 11  TPG 17, /16, CO 4.17, CI 1.98      Impression / Plan:   1. ICM - Stage C, NYHA Class IIIb-IV, LVEF 30-35%   Elevated OptiVol 20    TTE  shows decrease in EF to 30-35%   Hold BB due to bradycardia    Intolerant of ACE/ARB/ARNI due to KEYONNA on CKD   Begin Hydralazine 10 mg PO TID    Continue Bumex 2 mg IV BID    Recommend C and RHC possibly Monday - d/w Dr. Destiny Hooper    Low sodium diet   Daily weights   Strict I/O   Check Invitate - ATTR     2. CAD s/p CABG in , s/p PCI to DVG-RCA in 10/16   On ASA, statin   Hold BB due to bradycardia    Recommend [facility]    - add Zetia     3.  HTN - Stage 1   Intolerant of GDMT due to KEYONNA and bradycardia   Begin hydralazine   Low sodium diet   Compliant with BiPAP    4. VT - s/p AICD   No shocks   No arrhythmias on interrogation     5. WES - on BiPAP with oxygen   Compliant with BiPAP   Follows up with her sleep medicine physician at [facility] every 6 months    6. 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 Hypothyroidism   On levothyroxine 100 mcgs daily    8. Depression   On Wellbutrin  mg     9. History of Tobacco Abuse - 1/2 ppd x 20 years, quit 5 years ago   Counseled re: importance of continued abstinence    10. Gout    D/C colchicine due to KEYONNA    Continue allopurinol     11. Obesity (Body mass index is 39.56 kg/m². s/p gastric bypass in 2011    12. Osteoarthritis - s/p right TKR   Discontinue Celebrex due to CKD, HFrEF   S/p left knee injection in 8/2019   Lidocaine patches to knees     13. Hyperlipidemia    ,    Continue Lipitor 80 mg daily    Continue Zetia 10 mg PO daily   Will likely need Repatha    Low chol diet     13. Iron deficiency    Venofer 200 mg IV x 1     14. KEYONNA on CKD   Cr 2.08   D/C ARB, AA   Diurese   Avoid nephrotoxic agents    Renal consult if worse in AM     15.   Vitamin D deficiency   Continue cholecalciferol 2,000 units daily     DORI Bateman-BC  [address] [facility] [address]  [address] [facility], [address] [facility], [address] [place] [address]  Office [phone]  Fax [phone] No

## 2024-02-02 NOTE — ED ADULT NURSE NOTE - CHIEF COMPLAINT QUOTE
Patient BIB [facility] c/o palpitations and rapid heart rate. Pmh htn, prostate CA.

## 2024-02-02 NOTE — ED ADULT TRIAGE NOTE - CHIEF COMPLAINT QUOTE
Patient BIB [facility] c/o palpitations and rapid heart rate. Patient BIB [facility] c/o palpitations and rapid heart rate. Pmh htn, prostate CA.

## 2024-02-03 LAB
CULTURE RESULTS: SIGNIFICANT CHANGE UP
SPECIMEN SOURCE: SIGNIFICANT CHANGE UP

## 2024-02-16 NOTE — ED PROVIDER NOTE - MDM ORDERS SUBMITTED SELECTION
Presents to the ED today from home c/o right foot pain after falling on the ice. Patient did not hit her head, denies any other pain. A&0x4.
Labs/EKG/Imaging Studies/Medications

## 2024-02-29 ENCOUNTER — APPOINTMENT (OUTPATIENT)
Dept: NUCLEAR MEDICINE | Facility: CLINIC | Age: 79
End: 2024-02-29
Payer: MEDICARE

## 2024-02-29 PROCEDURE — 78816 PET IMAGE W/CT FULL BODY: CPT | Mod: PI

## 2024-02-29 PROCEDURE — A9595: CPT

## 2024-03-07 NOTE — ED PROVIDER NOTE - NS ED ATTENDING STATEMENT MOD
Patient: Donovan Hi Date: 3/6/2024  : 1953 Attending: Thanh Singh MD  70 year old male       Subjective: 3/5 no cardiac complaints. Bp is a little bit better.   3/6 patient looks very tired and fatigue    Pertinent Reviewed:     Vitals Last Value 24 Hour Range  Temperature 98.1 °F (36.7 °C) Temp  Min: 97.3 °F (36.3 °C)  Max: 98.4 °F (36.9 °C)  Pulse 99 Pulse  Min: 97  Max: 107  Respiratory 18 Resp  Min: 16  Max: 18  Blood Pressure 95/65 BP  Min: 93/63  Max: 112/80  Arterial BP   No data recorded  Pulse Oximetry 93 % SpO2  Min: 93 %  Max: 95 %    Vitals Today Admission  Weight 117.7 kg (259 lb 7.7 oz) Weight: 117 kg (257 lb 15 oz)    Weight over the past 48 Hours:  Patient Vitals for the past 48 hrs:   Weight   24 0658 117.6 kg (259 lb 4.2 oz)   24 0600 117.7 kg (259 lb 7.7 oz)          Intake/Output:    I/O last 3 completed shifts:  In: 730 [P.O.:480; IV Piggyback:250]  Out: 1887 [Other:1887]      Intake/Output Summary (Last 24 hours) at 3/6/2024 2246  Last data filed at 3/6/2024 1629  Gross per 24 hour   Intake 1171.85 ml   Output 1887 ml   Net -715.15 ml         Rhythm: Sinus Tachycardia    Medications/Infusions:  Scheduled:   Current Facility-Administered Medications   Medication Dose Route Frequency Provider Last Rate Last Admin    [START ON 3/10/2024] electrolyte/PEG 3350 (GOLYTELY) solution 4,000 mL  4,000 mL Oral Once Karol Lopez MD        VANCOMYCIN - PHARMACIST MONITORED Misc   Does not apply See Admin Instructions Kenya Persaud NP        cefTRIAXone (ROCEPHIN) syringe 2,000 mg  2,000 mg Intravenous Daily Kenya Persaud NP   2,000 mg at 24 1655    gabapentin (NEURONTIN) capsule 300 mg  300 mg Oral BID Thanh Singh MD   300 mg at 24    midodrine (PROAMATINE) tablet 10 mg  10 mg Oral BID  Thanh Singh MD   10 mg at 24 1656    pneumococcal 20-valent vaccine (PREVNAR 20) injection 0.5 mL  0.5 mL Intramuscular Once Thanh Singh MD         aspirin chewable 81 mg  81 mg Oral Daily Thanh Singh MD   81 mg at 03/05/24 1224    [Held by provider] clopidogrel (PLAVIX) tablet 75 mg  75 mg Oral Daily Thanh Singh MD   75 mg at 03/05/24 1224    melatonin tablet 9 mg  9 mg Oral QHS Thanh Singh MD   9 mg at 03/06/24 2112    pantoprazole (PROTONIX) EC tablet 40 mg  40 mg Oral QAM AC Thanh Singh MD   40 mg at 03/06/24 0631    polyethylene glycol (MIRALAX) packet 17 g  17 g Oral Daily Thanh Singh MD   17 g at 03/06/24 0950    rosuvastatin (CRESTOR) tablet 10 mg  10 mg Oral Daily Thanh Singh MD   10 mg at 03/06/24 0949    senna (SENOKOT) 8.6 mg  1 tablet Oral Daily Thanh Singh MD   8.6 mg at 03/06/24 0949    sevelamer carbonate (RENVELA) tablet 1,600 mg  1,600 mg Oral TID WC Thanh Singh MD   1,600 mg at 03/06/24 1648    sodium zirconium cyclosilicate (LOKELMA) packet 10 g  10 g Oral Daily Thanh Singh MD   10 g at 03/06/24 0950        Physical Exam:   Physical Exam  Vitals and nursing note reviewed.   Constitutional:       General: He is not in acute distress.     Appearance: He is obese. He is ill-appearing.   HENT:      Head: Normocephalic.      Mouth/Throat:      Mouth: Mucous membranes are moist.   Cardiovascular:      Rate and Rhythm: Regular rhythm. Tachycardia present.      Pulses: Normal pulses.      Heart sounds: Normal heart sounds.   Pulmonary:      Effort: Pulmonary effort is normal.      Breath sounds: Normal breath sounds.   Abdominal:      Palpations: Abdomen is soft.   Musculoskeletal:         General: Deformity present. Normal range of motion.   Skin:     General: Skin is warm.      Capillary Refill: Capillary refill takes less than 2 seconds.   Neurological:      Mental Status: He is alert and oriented to person, place, and time.   Psychiatric:         Mood and Affect: Mood normal.          Laboratory Results:    Recent Labs   Lab 03/06/24  0458 03/05/24  0514 03/04/24  1841 03/04/24  1307   WBC 8.2  --    --  8.8   HCT 23.3* 22.1*  --  20.6*   HGB 7.4* 7.0*  --  6.6*     --   --  309   INR  --   --   --  1.1   PTT  --   --   --  28   SODIUM 128* 129* 132* 131*   POTASSIUM 4.6 5.5* 5.3* 5.0   CHLORIDE 92* 94* 96* 97   CO2 19* 19* 21 19*   GLUCOSE 151* 100* 88 111*   * 114* 109* 107*   CREATININE 12.60* 12.20* 11.80* 11.40*         Imaging:  Encounter Date: 03/04/24   Electrocardiogram 12-Lead   Result Value    Ventricular Rate EKG/Min (BPM) 106    Atrial Rate (BPM) 106    CO-Interval (MSEC) 174    QRS-Interval (MSEC) 166    QT-Interval (MSEC) 360    QTc 478    P Axis (Degrees) 61    R Axis (Degrees) 178    T Axis (Degrees) 3    REPORT TEXT      Sinus tachycardia  with  premature atrial complexes  with  aberrant conduction  Right bundle branch block  Left posterior fascicular block   Bifascicular block  Abnormal ECG  When compared with ECG of  08-FEB-2024 12:43,  aberrant conduction  is now  present  CO interval  has decreased  Left posterior fascicular block  is now  present  T wave inversion now evident in  Anterior leads  Confirmed by ROSITA BAER, [facility] ([id number]) on 3/4/2024 8:29:54 PM       ECHO:   Date: 1/21/2023     STUDY CONCLUSIONS  SUMMARY:     1. Left ventricle: The cavity size is normal. Wall thickness is normal.     Systolic function is normal. The ejection fraction is 55%.  2. Aortic valve: Velocity is increased, due to stenosis. There is mild     stenosis.  3. Mitral valve: Inflow velocity is increased, due to stenosis. The findings     are consistent with moderate stenosis. There is mild regurgitation. The     mean diastolic gradient is 6mm Hg. Mass adjacent to posterior leaflet     appears to have decreased in size compared to echo oct 2022.  4. Right ventricle: The cavity size is normal. Wall thickness is normal.     Systolic function is normal.           Assessment and Plan:    70 years old admitted to the hospital with weakness     1.  Severe anemia with hemoglobin 6.4 patient received  blood transfusion transfusion patient needs to blood from [facility].  Patient on aspirin and Plavix     2.  Elevated troponin without chest pain borderline could be secondary to demand ischemia     Echocardiogram shows abnormal cardiac valves moderate aortic valve stenosis and moderate mitral valve stenosis (possible severe with evidence of calcification and mobile mass likely\" and vegetations need to be ruled out)     3.  End-stage renal disease previous on hemodialysis and recently started on peritoneal  dialysis     4.  Prior history of severe PAD with a previous amputation of the left leg with prior intervention at [facility] and amputation at [facility] and now deep ulceration of his right heel.  Patient is on dual antiplatelet therapy  Vascular surgeon evaluation - planning for peripheral angio     5.  Prior history of mitral valve endocarditis     6.  Hypotension we will hold all BP medication/Coreg  Started on midodrine     7.  Hyperlipidemia on statin       I have personally seen and examined this patient.  I have fully participated in the care of this patient. I have reviewed all pertinent clinical information, including history, physical exam, plan and the Resident’s note and agree except as noted.

## 2024-03-08 ENCOUNTER — APPOINTMENT (OUTPATIENT)
Dept: UROLOGY | Facility: CLINIC | Age: 79
End: 2024-03-08
Payer: MEDICARE

## 2024-03-08 PROCEDURE — 99213 OFFICE O/P EST LOW 20 MIN: CPT

## 2024-03-11 NOTE — ASSESSMENT
[FreeTextEntry1] : reviewed psma scan with pt  will get mri pelvis to better eval suspicious area on psma

## 2024-03-11 NOTE — HISTORY OF PRESENT ILLNESS
[FreeTextEntry1] : The patient-doctor relationship has been established in a face to face fashion via real time video/audio HIPAA compliant communication using telemedicine software.  The patient's identity has been confirmed.  The patient was previously emailed a copy of the telemedicine consent.  They have had a chance to review and has now given verbal consent and has requested care to be assessed and treated through telemedicine and understands there maybe limitations in this process and they may need further follow up care in the office and or hospital settings.       s/p prostate biopsy  doing well no voiding complaints  no fever  path huy 4+4   psma  indeterminate area iliac bone

## 2024-03-18 ENCOUNTER — APPOINTMENT (OUTPATIENT)
Dept: MRI IMAGING | Facility: CLINIC | Age: 79
End: 2024-03-18
Payer: MEDICARE

## 2024-03-18 PROCEDURE — 72197 MRI PELVIS W/O & W/DYE: CPT

## 2024-03-18 PROCEDURE — A9585: CPT | Mod: JW

## 2024-03-23 ENCOUNTER — EMERGENCY (EMERGENCY)
Facility: HOSPITAL | Age: 79
LOS: 0 days | Discharge: HOME | End: 2024-03-23
Payer: MEDICARE

## 2024-03-23 VITALS
WEIGHT: 149.91 LBS | HEIGHT: 70 IN | OXYGEN SATURATION: 100 % | HEART RATE: 80 BPM | SYSTOLIC BLOOD PRESSURE: 111 MMHG | RESPIRATION RATE: 20 BRPM | DIASTOLIC BLOOD PRESSURE: 66 MMHG | TEMPERATURE: 97 F

## 2024-03-23 VITALS
OXYGEN SATURATION: 100 % | RESPIRATION RATE: 18 BRPM | DIASTOLIC BLOOD PRESSURE: 81 MMHG | SYSTOLIC BLOOD PRESSURE: 136 MMHG | HEART RATE: 69 BPM | TEMPERATURE: 97 F

## 2024-03-23 DIAGNOSIS — D64.9 ANEMIA, UNSPECIFIED: ICD-10-CM

## 2024-03-23 DIAGNOSIS — N18.9 CHRONIC KIDNEY DISEASE, UNSPECIFIED: ICD-10-CM

## 2024-03-23 DIAGNOSIS — Z98.890 OTHER SPECIFIED POSTPROCEDURAL STATES: Chronic | ICD-10-CM
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
## 2024-03-23 DIAGNOSIS — I12.9 HYPERTENSIVE CHRONIC KIDNEY DISEASE WITH STAGE 1 THROUGH STAGE 4 CHRONIC KIDNEY DISEASE, OR UNSPECIFIED CHRONIC KIDNEY DISEASE: ICD-10-CM

## 2024-03-23 DIAGNOSIS — S37.009A UNSPECIFIED INJURY OF UNSPECIFIED KIDNEY, INITIAL ENCOUNTER: ICD-10-CM

## 2024-03-23 DIAGNOSIS — E78.5 HYPERLIPIDEMIA, UNSPECIFIED: ICD-10-CM
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
## 2024-03-23 LAB
ALBUMIN SERPL ELPH-MCNC: 3.6 G/DL — SIGNIFICANT CHANGE UP (ref 3.3–5)
ALP SERPL-CCNC: 64 U/L — SIGNIFICANT CHANGE UP (ref 40–120)
ALT FLD-CCNC: 16 U/L — SIGNIFICANT CHANGE UP (ref 12–78)
ANION GAP SERPL CALC-SCNC: 7 MMOL/L — SIGNIFICANT CHANGE UP (ref 5–17)
ANION GAP SERPL CALC-SCNC: 8 MMOL/L — SIGNIFICANT CHANGE UP (ref 5–17)
APPEARANCE UR: CLEAR — SIGNIFICANT CHANGE UP
APTT BLD: 34 SEC — SIGNIFICANT CHANGE UP (ref 24.5–35.6)
AST SERPL-CCNC: 25 U/L — SIGNIFICANT CHANGE UP (ref 15–37)
BASOPHILS # BLD AUTO: 0.09 K/UL — SIGNIFICANT CHANGE UP (ref 0–0.2)
BASOPHILS NFR BLD AUTO: 1.2 % — SIGNIFICANT CHANGE UP (ref 0–2)
BILIRUB SERPL-MCNC: 0.4 MG/DL — SIGNIFICANT CHANGE UP (ref 0.2–1.2)
BILIRUB UR-MCNC: NEGATIVE — SIGNIFICANT CHANGE UP
BLD GP AB SCN SERPL QL: SIGNIFICANT CHANGE UP
BUN SERPL-MCNC: 32 MG/DL — HIGH (ref 7–23)
BUN SERPL-MCNC: 34 MG/DL — HIGH (ref 7–23)
CALCIUM SERPL-MCNC: 8.6 MG/DL — SIGNIFICANT CHANGE UP (ref 8.5–10.1)
CALCIUM SERPL-MCNC: 9.2 MG/DL — SIGNIFICANT CHANGE UP (ref 8.5–10.1)
CHLORIDE SERPL-SCNC: 109 MMOL/L — HIGH (ref 96–108)
CHLORIDE SERPL-SCNC: 110 MMOL/L — HIGH (ref 96–108)
CO2 SERPL-SCNC: 24 MMOL/L — SIGNIFICANT CHANGE UP (ref 22–31)
CO2 SERPL-SCNC: 25 MMOL/L — SIGNIFICANT CHANGE UP (ref 22–31)
COLOR SPEC: YELLOW — SIGNIFICANT CHANGE UP
CREAT SERPL-MCNC: 2.04 MG/DL — HIGH (ref 0.5–1.3)
CREAT SERPL-MCNC: 2.32 MG/DL — HIGH (ref 0.5–1.3)
DIFF PNL FLD: NEGATIVE — SIGNIFICANT CHANGE UP
EGFR: 28 ML/MIN/1.73M2 — LOW
EGFR: 33 ML/MIN/1.73M2 — LOW
ELLIPTOCYTES BLD QL SMEAR: SLIGHT — SIGNIFICANT CHANGE UP
EOSINOPHIL # BLD AUTO: 0.13 K/UL — SIGNIFICANT CHANGE UP (ref 0–0.5)
EOSINOPHIL NFR BLD AUTO: 1.7 % — SIGNIFICANT CHANGE UP (ref 0–6)
GLUCOSE SERPL-MCNC: 89 MG/DL — SIGNIFICANT CHANGE UP (ref 70–99)
GLUCOSE SERPL-MCNC: 99 MG/DL — SIGNIFICANT CHANGE UP (ref 70–99)
GLUCOSE UR QL: NEGATIVE MG/DL — SIGNIFICANT CHANGE UP
HCT VFR BLD CALC: 29.2 % — LOW (ref 39–50)
HGB BLD-MCNC: 8 G/DL — LOW (ref 13–17)
HYPOCHROMIA BLD QL: SLIGHT — SIGNIFICANT CHANGE UP
IMM GRANULOCYTES NFR BLD AUTO: 0.3 % — SIGNIFICANT CHANGE UP (ref 0–0.9)
INR BLD: 0.95 RATIO — SIGNIFICANT CHANGE UP (ref 0.85–1.18)
KETONES UR-MCNC: NEGATIVE MG/DL — SIGNIFICANT CHANGE UP
LEUKOCYTE ESTERASE UR-ACNC: NEGATIVE — SIGNIFICANT CHANGE UP
LYMPHOCYTES # BLD AUTO: 2.45 K/UL — SIGNIFICANT CHANGE UP (ref 1–3.3)
LYMPHOCYTES # BLD AUTO: 31.9 % — SIGNIFICANT CHANGE UP (ref 13–44)
MANUAL SMEAR VERIFICATION: SIGNIFICANT CHANGE UP
MCHC RBC-ENTMCNC: 18.2 PG — LOW (ref 27–34)
MCHC RBC-ENTMCNC: 27.4 G/DL — LOW (ref 32–36)
MCV RBC AUTO: 66.5 FL — LOW (ref 80–100)
MICROCYTES BLD QL: SLIGHT — SIGNIFICANT CHANGE UP
MONOCYTES # BLD AUTO: 1.07 K/UL — HIGH (ref 0–0.9)
MONOCYTES NFR BLD AUTO: 14 % — SIGNIFICANT CHANGE UP (ref 2–14)
NEUTROPHILS # BLD AUTO: 3.91 K/UL — SIGNIFICANT CHANGE UP (ref 1.8–7.4)
NEUTROPHILS NFR BLD AUTO: 50.9 % — SIGNIFICANT CHANGE UP (ref 43–77)
NITRITE UR-MCNC: NEGATIVE — SIGNIFICANT CHANGE UP
NRBC # BLD: 0 /100 WBCS — SIGNIFICANT CHANGE UP (ref 0–0)
PH UR: 5.5 — SIGNIFICANT CHANGE UP (ref 5–8)
PLAT MORPH BLD: NORMAL — SIGNIFICANT CHANGE UP
PLATELET # BLD AUTO: 379 K/UL — SIGNIFICANT CHANGE UP (ref 150–400)
POIKILOCYTOSIS BLD QL AUTO: SLIGHT — SIGNIFICANT CHANGE UP
POTASSIUM SERPL-MCNC: 3.8 MMOL/L — SIGNIFICANT CHANGE UP (ref 3.5–5.3)
POTASSIUM SERPL-MCNC: 4.6 MMOL/L — SIGNIFICANT CHANGE UP (ref 3.5–5.3)
POTASSIUM SERPL-SCNC: 3.8 MMOL/L — SIGNIFICANT CHANGE UP (ref 3.5–5.3)
POTASSIUM SERPL-SCNC: 4.6 MMOL/L — SIGNIFICANT CHANGE UP (ref 3.5–5.3)
PROT SERPL-MCNC: 7.9 GM/DL — SIGNIFICANT CHANGE UP (ref 6–8.3)
PROT UR-MCNC: NEGATIVE MG/DL — SIGNIFICANT CHANGE UP
PROTHROM AB SERPL-ACNC: 11.4 SEC — SIGNIFICANT CHANGE UP (ref 9.5–13)
RBC # BLD: 4.39 M/UL — SIGNIFICANT CHANGE UP (ref 4.2–5.8)
RBC # FLD: 18.8 % — HIGH (ref 10.3–14.5)
RBC BLD AUTO: ABNORMAL
SODIUM SERPL-SCNC: 141 MMOL/L — SIGNIFICANT CHANGE UP (ref 135–145)
SODIUM SERPL-SCNC: 142 MMOL/L — SIGNIFICANT CHANGE UP (ref 135–145)
SP GR SPEC: 1.02 — SIGNIFICANT CHANGE UP (ref 1–1.03)
UROBILINOGEN FLD QL: 1 MG/DL — SIGNIFICANT CHANGE UP (ref 0.2–1)
WBC # BLD: 7.67 K/UL — SIGNIFICANT CHANGE UP (ref 3.8–10.5)
WBC # FLD AUTO: 7.67 K/UL — SIGNIFICANT CHANGE UP (ref 3.8–10.5)

## 2024-03-23 PROCEDURE — 99284 EMERGENCY DEPT VISIT MOD MDM: CPT

## 2024-03-23 RX ORDER — SODIUM CHLORIDE 9 MG/ML
1000 INJECTION INTRAMUSCULAR; INTRAVENOUS; SUBCUTANEOUS ONCE
Refills: 0 | Status: COMPLETED | OUTPATIENT
Start: 2024-03-23 | End: 2024-03-23

## 2024-03-23 RX ADMIN — SODIUM CHLORIDE 1000 MILLILITER(S): 9 INJECTION INTRAMUSCULAR; INTRAVENOUS; SUBCUTANEOUS at 18:04

## 2024-03-23 NOTE — ED ADULT TRIAGE NOTE - CHIEF COMPLAINT QUOTE
Sent by  for abnormal hgb reading , color pale H/O  Many blood transfusions, Prostate Ca 2  bleeding sites in colon

## 2024-03-23 NOTE — ED PROVIDER NOTE - OBJECTIVE STATEMENT
78-year-old male with known medical history of hypertension, hyperlipidemia, BPH, CKD presents emergency room for abnormal lab results.  Patient states he had labs drawn 2 days ago in preparation for seeing his kidney doctor in a few weeks. States he received a call today saying his hemoglobin was low and he should come to the ER for evaluation.  Patient denies bleeding from anywhere, states has had this before and has required blood transfusions.  Denies any blood thinners.  Denies lightheadedness, dizziness, headache, chest pain, shortness of breath, difficulty breathing, abdominal pain, nausea, vomiting, diarrhea or urinary complaints.

## 2024-03-23 NOTE — ED ADULT NURSE NOTE - GENITOURINARY ASSESSMENT
Patient seen in the office today in follow-up for ESRD. He has been relatively stable on dialysis with some fluctuations in his potassium and phosphorus levels especially around the holidays.   His weight has been stable and his blood pressure has been exc - - -

## 2024-03-23 NOTE — ED PROVIDER NOTE - CLINICAL SUMMARY MEDICAL DECISION MAKING FREE TEXT BOX
78-year-old male with known medical history of hypertension, hyperlipidemia, BPH, CKD presents emergency room for abnormal lab results - low hemoglobin. Patient denies bleeding from anywhere, states has had this before and has required blood transfusions.  Denies any blood thinners.  Denies lightheadedness, dizziness, headache, chest pain, shortness of breath, difficulty breathing, abdominal pain, nausea, vomiting, diarrhea or urinary complaints. Vital signs stable, pale conjunctiva, remaining exam unremarkable, no focal neuro deficit. Will get labs to rule out anemia and need for blood transfusion, reassess. Dispo pending results/reassessment.

## 2024-03-23 NOTE — ED ADULT NURSE NOTE - ED STAT RN HANDOFF DETAILS
Handoff given to oncoming RN Mónica. Plan of care reviewed, Pt concerns and pending orders endorsed.

## 2024-03-23 NOTE — ED ADULT NURSE NOTE - OBJECTIVE STATEMENT
79 yo male c/o abnormal lab values per kidney doctor. Was sent here to be reevaluated for HgB. Pt not complaining of pain, n/v/d, f/c, cp ,sob. pt ntoed to be ambulating with steady gait. Per pt has hx of colon bleeding that was cauterized.

## 2024-03-23 NOTE — ED PROVIDER NOTE - PATIENT PORTAL LINK FT
for at least 4 weeks You can access the FollowMyHealth Patient Portal offered by [facility] by registering at the following website: [email or web address]. By joining [employer]’s FollowMyHealth portal, you will also be able to view your health information using other applications (apps) compatible with our system.

## 2024-03-23 NOTE — ED PROVIDER NOTE - NSFOLLOWUPINSTRUCTIONS_ED_ALL_ED_FT
Today you were seen in the ER for anemia.     Please see below for a copy of your results.     Anemia    Anemia is a condition in which the concentration of red blood cells or hemoglobin in the blood is below normal. Hemoglobin is a substance in red blood cells that carries oxygen to the tissues of the body. Anemia results in not enough oxygen reaching these tissues which can cause symptoms such as weakness, dizziness/lightheadedness, shortness of breath, chest pain, paleness, or nausea. The cause of your anemia may or may not be determined immediately. If your hemoglobin was dangerously low, you may have received a blood transfusion. Usually reactions to transfusions occur immediately but monitor yourself for any fevers, rash, or shortness of breath.    SEEK IMMEDIATE MEDICAL CARE IF YOU HAVE ANY OF THE FOLLOWING SYMPTOMS: extreme weakness/chest pain/shortness of breath, black or bloody stools, vomiting blood, fainting, fever, or any signs of dehydration.    Advance activity as tolerated.      Continue all previously prescribed medications as directed unless otherwise instructed.      Follow up with your primary care physician in 48-72 hours- bring copies of your results.

## 2024-03-23 NOTE — ED PROVIDER NOTE - PROGRESS NOTE DETAILS
JULIO Gay: all results reviewed with patient, hemoglobin stable, Cr elevated, urine negative, BMP repeated after IVF, still elevated but improved, patient prefers to go home and follow up outpatient, will continue to hydrate well, will dc.

## 2024-03-24 LAB
CULTURE RESULTS: SIGNIFICANT CHANGE UP
SPECIMEN SOURCE: SIGNIFICANT CHANGE UP

## 2024-03-29 ENCOUNTER — APPOINTMENT (OUTPATIENT)
Dept: UROLOGY | Facility: CLINIC | Age: 79
End: 2024-03-29
Payer: MEDICARE

## 2024-03-29 VITALS
TEMPERATURE: 97.8 F | HEART RATE: 89 BPM | HEIGHT: 71 IN | WEIGHT: 150 LBS | DIASTOLIC BLOOD PRESSURE: 67 MMHG | OXYGEN SATURATION: 98 % | BODY MASS INDEX: 21 KG/M2 | SYSTOLIC BLOOD PRESSURE: 123 MMHG

## 2024-03-29 PROCEDURE — 99214 OFFICE O/P EST MOD 30 MIN: CPT

## 2024-03-29 RX ORDER — BICALUTAMIDE 50 MG/1
50 TABLET ORAL DAILY
Qty: 30 | Refills: 0 | Status: ACTIVE | COMMUNITY
Start: 2024-03-29 | End: 1900-01-01

## 2024-04-04 NOTE — HISTORY OF PRESENT ILLNESS
[FreeTextEntry1] :  s/p prostate biopsy  doing well no voiding complaints  no fever  path huy 4+4   psma  indeterminate area iliac bone  mri pelvis  IMPRESSION:  No convincing MR evidence for pelvic osseous metastasis. Specifically no focal signal abnormality to correspond with site of clinical concern within right subchondral iliac bone. Advanced bilateral hip arthrosis.

## 2024-04-04 NOTE — ASSESSMENT
[FreeTextEntry1] : reviewed mri  no evidence for mets  elects radiation  We discussed the risk of ADT including decreased libido, hot flashes, possible loss of bone density or muscle atrophy. I've explained to him that there are therapeutic advantage is in terms of cancer control better evidence based when receiving neoadjuvant androgen deprivation therapy versus radiation therapy alone. start bicalutamide f/u dr gil for lupron spaceoar and fiducials reviewed

## 2024-04-28 ENCOUNTER — NON-APPOINTMENT (OUTPATIENT)
Age: 79
End: 2024-04-28

## 2024-05-02 ENCOUNTER — NON-APPOINTMENT (OUTPATIENT)
Age: 79
End: 2024-05-02

## 2024-05-10 ENCOUNTER — APPOINTMENT (OUTPATIENT)
Dept: UROLOGY | Facility: CLINIC | Age: 79
End: 2024-05-10
Payer: MEDICARE

## 2024-05-10 VITALS
TEMPERATURE: 97.5 F | DIASTOLIC BLOOD PRESSURE: 83 MMHG | WEIGHT: 150 LBS | SYSTOLIC BLOOD PRESSURE: 142 MMHG | HEIGHT: 71 IN | HEART RATE: 90 BPM | BODY MASS INDEX: 21 KG/M2 | OXYGEN SATURATION: 99 %

## 2024-05-10 DIAGNOSIS — C61 MALIGNANT NEOPLASM OF PROSTATE: ICD-10-CM

## 2024-05-10 PROCEDURE — A4648: CPT | Mod: NC

## 2024-05-10 PROCEDURE — 55874Z: CUSTOM

## 2024-05-10 PROCEDURE — 55876 PLACE RT DEVICE/MARKER PROS: CPT

## 2024-05-10 PROCEDURE — A4648: CPT

## 2024-05-21 PROBLEM — C61 CANCER OF PROSTATE: Status: ACTIVE | Noted: 2024-01-10

## 2024-06-04 NOTE — H&P ADULT - PROBLEM SELECTOR PLAN 2
High c/f legionella dawson: given smoker, elderly, hyponatremia. Will f/u urine legionella. No High c/f legionella dawson: given smoker, elderly, hyponatremia. Will f/u urine legionella.   - Will continue with ctx and azithro Severe Sepsis due to PNA, with associated metabolic encephalopathy and FLORENTIN  - Will c/w ctx and azithromycin  - F/u blood and urine cultures  -received IVF resuscitation

## 2024-07-25 NOTE — PROGRESS NOTE ADULT - PROBLEM SELECTOR PROBLEM 8
Stable, no shortness of breath.  On HD per nephrology  Isosorbide dinitrate  Metoprolol  Diltiazem decreased to 240 mg daily due to low diastolics at times  Renal diet  Monitor BP  Remove extra fluid in dialysis   Medication management Prophylactic measure

## 2024-10-03 NOTE — ASU DISCHARGE PLAN (ADULT/PEDIATRIC) - NSDISCH_INCISION_ENDO_ALL_CORE_FT
S/P hysterectomy with ovarian preservation  - Will need pap smears on vaginal cuff for monitoring   If an incision was performed as part of your procedure, contact your doctor with any pain, swelling, redness, or other concerns you may have about that area.

## 2025-02-11 NOTE — ED PROVIDER NOTE - NS_EDPROVIDERDISPOUSERTYPE_ED_A_ED
For information on Fall & Injury Prevention, visit: [email or web address] OR  [email or web address] OR  https://www.cdc.gov/steadi/patient.html
Attending Attestation (For Attendings USE Only)...

## 2025-03-04 NOTE — ED PROVIDER NOTE - PRINCIPAL DIAGNOSIS
[At Term] : at term
[[place] : in [place]
[Normal Vaginal Route] : by normal vaginal route
[None] : there were no delivery complications
Anemia
pt refused

## 2025-04-12 NOTE — ED PROVIDER NOTE - EYES NEGATIVE STATEMENT, MLM
Problem: Patient Centered Care  Goal: Patient preferences are identified and integrated in the patient's plan of care  Description: Interventions:- What would you like us to know as we care for you? From home with spouse- Provide timely, complete, and accurate information to patient/family- Incorporate patient and family knowledge, values, beliefs, and cultural backgrounds into the planning and delivery of care- Encourage patient/family to participate in care and decision-making at the level they choose- Honor patient and family perspectives and choices  Outcome: Progressing     Problem: SAFETY ADULT - FALL  Goal: Free from fall injury  Description: INTERVENTIONS:- Assess pt frequently for physical needs- Identify cognitive and physical deficits and behaviors that affect risk of falls.- [place] fall precautions as indicated by assessment.- Educate pt/family on patient safety including physical limitations- Instruct pt to call for assistance with activity based on assessment- Modify environment to reduce risk of injury- Provide assistive devices as appropriate- Consider OT/PT consult to assist with strengthening/mobility- Encourage toileting schedule  Outcome: Progressing     Problem: CARDIOVASCULAR - ADULT  Goal: Maintains optimal cardiac output and hemodynamic stability  Description: INTERVENTIONS:- Monitor vital signs, rhythm, and trends- Monitor for bleeding, hypotension and signs of decreased cardiac output- Evaluate effectiveness of vasoactive medications to optimize hemodynamic stability- Monitor arterial and/or venous puncture sites for bleeding and/or hematoma- Assess quality of pulses, skin color and temperature- Assess for signs of decreased coronary artery perfusion - ex. Angina- Evaluate fluid balance, assess for edema, trend weights  Outcome: Progressing  Goal: Absence of cardiac arrhythmias or at baseline  Description: INTERVENTIONS:- Continuous cardiac monitoring, monitor vital signs, obtain 12  lead EKG if indicated- Evaluate effectiveness of antiarrhythmic and heart rate control medications as ordered- Initiate emergency measures for life threatening arrhythmias- Monitor electrolytes and administer replacement therapy as ordered  Outcome: Progressing     Problem: NEUROLOGICAL - ADULT  Goal: Achieves stable or improved neurological status  Description: INTERVENTIONS- Assess for and report changes in neurological status- Initiate measures to prevent increased intracranial pressure- Maintain blood pressure and fluid volume within ordered parameters to optimize cerebral perfusion and minimize risk of hemorrhage- Monitor temperature, glucose, and sodium. Initiate appropriate interventions as ordered  Outcome: Progressing  Goal: Remains free of injury related to seizure activity  Description: INTERVENTIONS:- Maintain airway, patient safety  and administer oxygen as ordered- Monitor patient for seizure activity, document and report duration and description of seizure to MD/LIP- If seizure occurs, turn patient to side and suction secretions as needed- Reorient patient post seizure- Seizure pads on all 4 side rails- Instruct patient/family to notify RN of any seizure activity- Instruct patient/family to call for assistance with activity based on assessment  Outcome: Progressing  Goal: Achieves maximal functionality and self care  Description: INTERVENTIONS- Monitor swallowing and airway patency with patient fatigue and changes in neurological status- Encourage and assist patient to increase activity and self care with guidance from PT/OT- Encourage visually impaired, hearing impaired and aphasic patients to use assistive/communication devices  Outcome: Progressing     Patient alert and oriented x4.  Call light within reach.  Scheduled Meclizine given per MAR.  Safety precautions in place.     no discharge, no irritation, no pain, no redness, and no visual changes.

## (undated) DEVICE — PACK IV START WITH CHG

## (undated) DEVICE — FOLEY HOLDER STATLOCK 2 WAY ADULT

## (undated) DEVICE — BITE BLOCK ADULT 20 X 27MM (GREEN)

## (undated) DEVICE — CATH IV SAFE BC 22G X 1" (BLUE)

## (undated) DEVICE — PROBE FIAPC CIRC O.D. 2.3MM/6.9FR LNTH 220CM/7.2FT

## (undated) DEVICE — BALLOON US ENDO

## (undated) DEVICE — SOL INJ NS 0.9% 500ML 2 PORT

## (undated) DEVICE — SYR ALLIANCE II INFLATION 60ML

## (undated) DEVICE — TUBING SUCTION 20FT

## (undated) DEVICE — CATH IV SAFE BC 20G X 1.16" (PINK)

## (undated) DEVICE — TUBING IV SET GRAVITY 3Y 100" MACRO

## (undated) DEVICE — SUCTION YANKAUER NO CONTROL VENT

## (undated) DEVICE — TUBING SUCTION CONN 6FT STERILE

## (undated) DEVICE — SENSOR O2 FINGER ADULT 24/CA